# Patient Record
Sex: FEMALE | Race: WHITE | NOT HISPANIC OR LATINO | Employment: OTHER | URBAN - METROPOLITAN AREA
[De-identification: names, ages, dates, MRNs, and addresses within clinical notes are randomized per-mention and may not be internally consistent; named-entity substitution may affect disease eponyms.]

---

## 2023-01-01 ENCOUNTER — TELEPHONE (OUTPATIENT)
Dept: PRIMARY CARE | Facility: CLINIC | Age: 73
End: 2023-01-01
Payer: MEDICARE

## 2023-01-01 ENCOUNTER — APPOINTMENT (OUTPATIENT)
Dept: RADIOLOGY | Facility: HOSPITAL | Age: 73
DRG: 299 | End: 2023-01-01
Payer: MEDICARE

## 2023-01-01 ENCOUNTER — APPOINTMENT (OUTPATIENT)
Dept: CARDIOLOGY | Facility: HOSPITAL | Age: 73
DRG: 299 | End: 2023-01-01
Payer: MEDICARE

## 2023-01-01 ENCOUNTER — OFFICE VISIT (OUTPATIENT)
Dept: NEPHROLOGY | Facility: CLINIC | Age: 73
End: 2023-01-01
Payer: MEDICARE

## 2023-01-01 ENCOUNTER — DOCUMENTATION (OUTPATIENT)
Dept: PRIMARY CARE | Facility: CLINIC | Age: 73
End: 2023-01-01
Payer: MEDICARE

## 2023-01-01 ENCOUNTER — HOSPITAL ENCOUNTER (OUTPATIENT)
Dept: CARDIOLOGY | Facility: HOSPITAL | Age: 73
Discharge: HOME | DRG: 299 | End: 2023-12-01
Payer: MEDICARE

## 2023-01-01 ENCOUNTER — TELEPHONE (OUTPATIENT)
Dept: PRIMARY CARE | Facility: CLINIC | Age: 73
End: 2023-01-01

## 2023-01-01 ENCOUNTER — OFFICE VISIT (OUTPATIENT)
Dept: PRIMARY CARE | Facility: CLINIC | Age: 73
End: 2023-01-01
Payer: MEDICARE

## 2023-01-01 ENCOUNTER — APPOINTMENT (OUTPATIENT)
Dept: VASCULAR SURGERY | Facility: CLINIC | Age: 73
End: 2023-01-01
Payer: MEDICARE

## 2023-01-01 ENCOUNTER — OFFICE VISIT (OUTPATIENT)
Dept: PODIATRY | Facility: CLINIC | Age: 73
End: 2023-01-01
Payer: MEDICARE

## 2023-01-01 ENCOUNTER — HOSPITAL ENCOUNTER (OUTPATIENT)
Dept: CARDIOLOGY | Facility: HOSPITAL | Age: 73
Discharge: HOME | End: 2023-11-20
Payer: MEDICARE

## 2023-01-01 ENCOUNTER — APPOINTMENT (OUTPATIENT)
Dept: PRIMARY CARE | Facility: CLINIC | Age: 73
End: 2023-01-01
Payer: MEDICARE

## 2023-01-01 ENCOUNTER — PATIENT MESSAGE (OUTPATIENT)
Dept: PRIMARY CARE | Facility: CLINIC | Age: 73
End: 2023-01-01
Payer: MEDICARE

## 2023-01-01 ENCOUNTER — APPOINTMENT (OUTPATIENT)
Dept: CARDIOLOGY | Facility: HOSPITAL | Age: 73
DRG: 683 | End: 2023-01-01
Payer: MEDICARE

## 2023-01-01 ENCOUNTER — APPOINTMENT (OUTPATIENT)
Dept: PODIATRY | Facility: CLINIC | Age: 73
End: 2023-01-01
Payer: MEDICARE

## 2023-01-01 ENCOUNTER — APPOINTMENT (OUTPATIENT)
Dept: RADIOLOGY | Facility: HOSPITAL | Age: 73
DRG: 683 | End: 2023-01-01
Payer: MEDICARE

## 2023-01-01 ENCOUNTER — PATIENT OUTREACH (OUTPATIENT)
Dept: CARE COORDINATION | Facility: CLINIC | Age: 73
End: 2023-01-01
Payer: MEDICARE

## 2023-01-01 ENCOUNTER — HOSPITAL ENCOUNTER (EMERGENCY)
Facility: HOSPITAL | Age: 73
Discharge: HOME | DRG: 299 | End: 2023-11-21
Attending: STUDENT IN AN ORGANIZED HEALTH CARE EDUCATION/TRAINING PROGRAM
Payer: MEDICARE

## 2023-01-01 ENCOUNTER — HOSPITAL ENCOUNTER (OUTPATIENT)
Dept: CARDIOLOGY | Facility: HOSPITAL | Age: 73
Discharge: HOME | End: 2023-12-05
Payer: MEDICARE

## 2023-01-01 ENCOUNTER — APPOINTMENT (OUTPATIENT)
Dept: OPHTHALMOLOGY | Facility: CLINIC | Age: 73
End: 2023-01-01
Payer: MEDICARE

## 2023-01-01 ENCOUNTER — HOSPITAL ENCOUNTER (INPATIENT)
Facility: HOSPITAL | Age: 73
LOS: 4 days | Discharge: NURSING, REHAB OR LONG-TERM CARE | DRG: 299 | End: 2023-12-02
Attending: EMERGENCY MEDICINE | Admitting: EMERGENCY MEDICINE
Payer: MEDICARE

## 2023-01-01 ENCOUNTER — HOSPITAL ENCOUNTER (INPATIENT)
Facility: HOSPITAL | Age: 73
LOS: 1 days | Discharge: HOME | DRG: 638 | End: 2023-10-18
Attending: STUDENT IN AN ORGANIZED HEALTH CARE EDUCATION/TRAINING PROGRAM | Admitting: STUDENT IN AN ORGANIZED HEALTH CARE EDUCATION/TRAINING PROGRAM
Payer: MEDICARE

## 2023-01-01 ENCOUNTER — HOSPITAL ENCOUNTER (INPATIENT)
Facility: HOSPITAL | Age: 73
LOS: 4 days | Discharge: NURSING, REHAB OR LONG-TERM CARE | DRG: 299 | End: 2023-11-28
Attending: STUDENT IN AN ORGANIZED HEALTH CARE EDUCATION/TRAINING PROGRAM | Admitting: STUDENT IN AN ORGANIZED HEALTH CARE EDUCATION/TRAINING PROGRAM
Payer: MEDICARE

## 2023-01-01 ENCOUNTER — TELEPHONE (OUTPATIENT)
Dept: PHARMACY | Facility: HOSPITAL | Age: 73
End: 2023-01-01
Payer: MEDICARE

## 2023-01-01 ENCOUNTER — OFFICE VISIT (OUTPATIENT)
Dept: CARDIOLOGY | Facility: CLINIC | Age: 73
End: 2023-01-01
Payer: MEDICARE

## 2023-01-01 ENCOUNTER — HOSPITAL ENCOUNTER (INPATIENT)
Facility: HOSPITAL | Age: 73
LOS: 5 days | Discharge: NURSING, REHAB OR LONG-TERM CARE | DRG: 683 | End: 2023-10-27
Attending: EMERGENCY MEDICINE | Admitting: INTERNAL MEDICINE
Payer: MEDICARE

## 2023-01-01 ENCOUNTER — TELEMEDICINE (OUTPATIENT)
Dept: PRIMARY CARE | Facility: CLINIC | Age: 73
End: 2023-01-01
Payer: MEDICARE

## 2023-01-01 ENCOUNTER — APPOINTMENT (OUTPATIENT)
Dept: WOUND CARE | Facility: CLINIC | Age: 73
End: 2023-01-01
Payer: MEDICARE

## 2023-01-01 VITALS
RESPIRATION RATE: 18 BRPM | WEIGHT: 179.9 LBS | DIASTOLIC BLOOD PRESSURE: 62 MMHG | TEMPERATURE: 96.8 F | SYSTOLIC BLOOD PRESSURE: 137 MMHG | HEIGHT: 58 IN | HEART RATE: 70 BPM | OXYGEN SATURATION: 99 % | BODY MASS INDEX: 37.76 KG/M2

## 2023-01-01 VITALS
HEIGHT: 58 IN | BODY MASS INDEX: 38.46 KG/M2 | SYSTOLIC BLOOD PRESSURE: 134 MMHG | WEIGHT: 183.2 LBS | DIASTOLIC BLOOD PRESSURE: 77 MMHG | TEMPERATURE: 98.6 F | HEART RATE: 71 BPM

## 2023-01-01 VITALS
TEMPERATURE: 97.8 F | WEIGHT: 182 LBS | SYSTOLIC BLOOD PRESSURE: 122 MMHG | BODY MASS INDEX: 38.04 KG/M2 | DIASTOLIC BLOOD PRESSURE: 70 MMHG | RESPIRATION RATE: 17 BRPM | OXYGEN SATURATION: 98 % | HEART RATE: 78 BPM

## 2023-01-01 VITALS
OXYGEN SATURATION: 98 % | WEIGHT: 213 LBS | BODY MASS INDEX: 44.52 KG/M2 | SYSTOLIC BLOOD PRESSURE: 105 MMHG | HEART RATE: 71 BPM | DIASTOLIC BLOOD PRESSURE: 50 MMHG

## 2023-01-01 VITALS
HEIGHT: 58 IN | RESPIRATION RATE: 16 BRPM | BODY MASS INDEX: 38.83 KG/M2 | TEMPERATURE: 97 F | HEART RATE: 68 BPM | WEIGHT: 185 LBS | SYSTOLIC BLOOD PRESSURE: 179 MMHG | DIASTOLIC BLOOD PRESSURE: 92 MMHG | OXYGEN SATURATION: 97 %

## 2023-01-01 VITALS — HEART RATE: 76 BPM | OXYGEN SATURATION: 97 % | WEIGHT: 210 LBS | BODY MASS INDEX: 44.08 KG/M2 | HEIGHT: 58 IN

## 2023-01-01 VITALS
RESPIRATION RATE: 16 BRPM | DIASTOLIC BLOOD PRESSURE: 50 MMHG | OXYGEN SATURATION: 95 % | TEMPERATURE: 97.9 F | HEART RATE: 72 BPM | WEIGHT: 200 LBS | BODY MASS INDEX: 41.98 KG/M2 | HEIGHT: 58 IN | SYSTOLIC BLOOD PRESSURE: 122 MMHG

## 2023-01-01 VITALS
RESPIRATION RATE: 18 BRPM | HEART RATE: 82 BPM | DIASTOLIC BLOOD PRESSURE: 72 MMHG | BODY MASS INDEX: 41.65 KG/M2 | WEIGHT: 198.41 LBS | OXYGEN SATURATION: 98 % | HEIGHT: 58 IN | SYSTOLIC BLOOD PRESSURE: 148 MMHG | TEMPERATURE: 97.5 F

## 2023-01-01 DIAGNOSIS — T14.8XXA WOUND INFECTION: ICD-10-CM

## 2023-01-01 DIAGNOSIS — R00.1 BRADYCARDIA: ICD-10-CM

## 2023-01-01 DIAGNOSIS — I87.2 CHRONIC VENOUS INSUFFICIENCY: ICD-10-CM

## 2023-01-01 DIAGNOSIS — I47.11 INAPPROPRIATE SINUS TACHYCARDIA (CMS-HCC): ICD-10-CM

## 2023-01-01 DIAGNOSIS — I25.10 ATHEROSCLEROSIS OF NATIVE CORONARY ARTERY OF NATIVE HEART WITHOUT ANGINA PECTORIS: ICD-10-CM

## 2023-01-01 DIAGNOSIS — H40.003 GLAUCOMA SUSPECT OF BOTH EYES: Primary | ICD-10-CM

## 2023-01-01 DIAGNOSIS — I87.331 IDIOPATHIC CHRONIC VENOUS HTN OF RIGHT LEG WITH ULCER AND INFLAMMATION (MULTI): ICD-10-CM

## 2023-01-01 DIAGNOSIS — I87.8 VENOUS STASIS: ICD-10-CM

## 2023-01-01 DIAGNOSIS — N18.4 CKD (CHRONIC KIDNEY DISEASE) STAGE 4, GFR 15-29 ML/MIN (MULTI): Chronic | ICD-10-CM

## 2023-01-01 DIAGNOSIS — L03.115 CELLULITIS OF RIGHT LEG: Primary | ICD-10-CM

## 2023-01-01 DIAGNOSIS — Z95.0 CARDIAC PACEMAKER IN SITU: Chronic | ICD-10-CM

## 2023-01-01 DIAGNOSIS — L97.912: Primary | ICD-10-CM

## 2023-01-01 DIAGNOSIS — E11.22 CKD STAGE 3 DUE TO TYPE 2 DIABETES MELLITUS (MULTI): ICD-10-CM

## 2023-01-01 DIAGNOSIS — I12.9 HYPERTENSIVE KIDNEY DISEASE WITH STAGE 4 CHRONIC KIDNEY DISEASE (MULTI): Primary | ICD-10-CM

## 2023-01-01 DIAGNOSIS — R78.81 BACTEREMIA: ICD-10-CM

## 2023-01-01 DIAGNOSIS — Z95.0 PACEMAKER: ICD-10-CM

## 2023-01-01 DIAGNOSIS — I89.0 LYMPHEDEMA: ICD-10-CM

## 2023-01-01 DIAGNOSIS — B35.1 ONYCHOMYCOSIS: ICD-10-CM

## 2023-01-01 DIAGNOSIS — R60.0 EDEMA OF LEFT UPPER EXTREMITY: Primary | ICD-10-CM

## 2023-01-01 DIAGNOSIS — E78.2 MIXED HYPERLIPIDEMIA: Chronic | ICD-10-CM

## 2023-01-01 DIAGNOSIS — L03.116 CELLULITIS OF LEFT LOWER EXTREMITY: ICD-10-CM

## 2023-01-01 DIAGNOSIS — I10 ESSENTIAL (PRIMARY) HYPERTENSION: Chronic | ICD-10-CM

## 2023-01-01 DIAGNOSIS — L08.9 WOUND INFECTION: Primary | ICD-10-CM

## 2023-01-01 DIAGNOSIS — E11.69 TYPE 2 DIABETES MELLITUS WITH OTHER SPECIFIED COMPLICATION, WITH LONG-TERM CURRENT USE OF INSULIN (MULTI): Primary | ICD-10-CM

## 2023-01-01 DIAGNOSIS — M79.89 REDNESS AND SWELLING OF UPPER ARM: ICD-10-CM

## 2023-01-01 DIAGNOSIS — I87.332 IDIOPATHIC CHRONIC VENOUS HYPERTENSION OF LEG W/ULCER AND INFLAMMATION, LEFT (MULTI): ICD-10-CM

## 2023-01-01 DIAGNOSIS — L08.9 WOUND INFECTION: ICD-10-CM

## 2023-01-01 DIAGNOSIS — M10.9 GOUT, UNSPECIFIED CAUSE, UNSPECIFIED CHRONICITY, UNSPECIFIED SITE: ICD-10-CM

## 2023-01-01 DIAGNOSIS — D72.829 LEUKOCYTOSIS, UNSPECIFIED TYPE: ICD-10-CM

## 2023-01-01 DIAGNOSIS — R23.8 REDNESS AND SWELLING OF UPPER ARM: ICD-10-CM

## 2023-01-01 DIAGNOSIS — Z95.0 PACEMAKER: Primary | ICD-10-CM

## 2023-01-01 DIAGNOSIS — I89.0 LYMPHEDEMA: Primary | ICD-10-CM

## 2023-01-01 DIAGNOSIS — I48.0 PAROXYSMAL ATRIAL FIBRILLATION (MULTI): Primary | ICD-10-CM

## 2023-01-01 DIAGNOSIS — M25.422 SWELLING OF JOINT OF UPPER ARM, LEFT: ICD-10-CM

## 2023-01-01 DIAGNOSIS — J18.9 COMMUNITY ACQUIRED PNEUMONIA, UNSPECIFIED LATERALITY: ICD-10-CM

## 2023-01-01 DIAGNOSIS — E11.21 CONTROLLED TYPE 2 DIABETES MELLITUS WITH MICROALBUMINURIC DIABETIC NEPHROPATHY (MULTI): Chronic | ICD-10-CM

## 2023-01-01 DIAGNOSIS — N17.9 ACUTE RENAL FAILURE, UNSPECIFIED ACUTE RENAL FAILURE TYPE (CMS-HCC): ICD-10-CM

## 2023-01-01 DIAGNOSIS — I87.2 VENOUS INSUFFICIENCY (CHRONIC) (PERIPHERAL): Chronic | ICD-10-CM

## 2023-01-01 DIAGNOSIS — L97.912 NON-PRESSURE ULCER OF RIGHT LOWER EXTREMITY WITH FAT LAYER EXPOSED (MULTI): ICD-10-CM

## 2023-01-01 DIAGNOSIS — R21 RASH: ICD-10-CM

## 2023-01-01 DIAGNOSIS — L03.115 CELLULITIS OF LEG, RIGHT: ICD-10-CM

## 2023-01-01 DIAGNOSIS — T78.40XA ALLERGIC REACTION, INITIAL ENCOUNTER: ICD-10-CM

## 2023-01-01 DIAGNOSIS — L97.909 CHRONIC CUTANEOUS VENOUS STASIS ULCER (MULTI): Primary | ICD-10-CM

## 2023-01-01 DIAGNOSIS — B37.2 YEAST INFECTION OF THE SKIN: ICD-10-CM

## 2023-01-01 DIAGNOSIS — L03.115 CELLULITIS OF RIGHT LOWER EXTREMITY: ICD-10-CM

## 2023-01-01 DIAGNOSIS — A04.72 C. DIFFICILE DIARRHEA: ICD-10-CM

## 2023-01-01 DIAGNOSIS — E16.2 HYPOGLYCEMIA: Primary | ICD-10-CM

## 2023-01-01 DIAGNOSIS — B37.9 CANDIDIASIS: ICD-10-CM

## 2023-01-01 DIAGNOSIS — L85.3 XEROSIS OF SKIN: ICD-10-CM

## 2023-01-01 DIAGNOSIS — T14.8XXA WOUND INFECTION: Primary | ICD-10-CM

## 2023-01-01 DIAGNOSIS — I10 BENIGN ESSENTIAL HYPERTENSION: Chronic | ICD-10-CM

## 2023-01-01 DIAGNOSIS — N18.30 CKD STAGE 3 DUE TO TYPE 2 DIABETES MELLITUS (MULTI): ICD-10-CM

## 2023-01-01 DIAGNOSIS — R19.7 DIARRHEA, UNSPECIFIED TYPE: ICD-10-CM

## 2023-01-01 DIAGNOSIS — I83.009 CHRONIC CUTANEOUS VENOUS STASIS ULCER (MULTI): Primary | ICD-10-CM

## 2023-01-01 DIAGNOSIS — R00.0 TACHYCARDIA: ICD-10-CM

## 2023-01-01 DIAGNOSIS — I05.9 MITRAL VALVE DISORDER: ICD-10-CM

## 2023-01-01 DIAGNOSIS — M10.9 GOUT, UNSPECIFIED CAUSE, UNSPECIFIED CHRONICITY, UNSPECIFIED SITE: Primary | ICD-10-CM

## 2023-01-01 DIAGNOSIS — I48.20 CHRONIC ATRIAL FIBRILLATION (MULTI): Chronic | ICD-10-CM

## 2023-01-01 DIAGNOSIS — N18.4 HYPERTENSIVE KIDNEY DISEASE WITH STAGE 4 CHRONIC KIDNEY DISEASE (MULTI): Primary | ICD-10-CM

## 2023-01-01 DIAGNOSIS — T78.40XA ALLERGIC REACTION, INITIAL ENCOUNTER: Primary | ICD-10-CM

## 2023-01-01 DIAGNOSIS — Z79.4 TYPE 2 DIABETES MELLITUS WITH OTHER SPECIFIED COMPLICATION, WITH LONG-TERM CURRENT USE OF INSULIN (MULTI): Primary | ICD-10-CM

## 2023-01-01 DIAGNOSIS — K86.2 PANCREATIC CYST (HHS-HCC): Chronic | ICD-10-CM

## 2023-01-01 LAB
ALBUMIN (G/DL) IN SER/PLAS: 3.9 G/DL (ref 3.4–5)
ALBUMIN (G/DL) IN SER/PLAS: 4.2 G/DL (ref 3.4–5)
ALBUMIN (G/DL) IN SER/PLAS: 4.3 G/DL (ref 3.4–5)
ALBUMIN (G/DL) IN SER/PLAS: 4.4 G/DL (ref 3.4–5)
ALBUMIN (MG/L) IN URINE: 159.5 MG/L
ALBUMIN (MG/L) IN URINE: 36.7 MG/L
ALBUMIN SERPL BCP-MCNC: 2.3 G/DL (ref 3.4–5)
ALBUMIN SERPL BCP-MCNC: 2.3 G/DL (ref 3.4–5)
ALBUMIN SERPL BCP-MCNC: 2.4 G/DL (ref 3.4–5)
ALBUMIN SERPL BCP-MCNC: 2.5 G/DL (ref 3.4–5)
ALBUMIN SERPL BCP-MCNC: 2.6 G/DL (ref 3.4–5)
ALBUMIN SERPL BCP-MCNC: 2.8 G/DL (ref 3.4–5)
ALBUMIN SERPL BCP-MCNC: 3 G/DL (ref 3.4–5)
ALBUMIN SERPL BCP-MCNC: 3.1 G/DL (ref 3.4–5)
ALBUMIN SERPL BCP-MCNC: 3.3 G/DL (ref 3.4–5)
ALBUMIN SERPL BCP-MCNC: 3.4 G/DL (ref 3.4–5)
ALBUMIN SERPL BCP-MCNC: 3.5 G/DL (ref 3.4–5)
ALBUMIN SERPL BCP-MCNC: 3.5 G/DL (ref 3.4–5)
ALBUMIN/CREATININE (UG/MG) IN URINE: 247.3 UG/MG CRT (ref 0–30)
ALBUMIN/CREATININE (UG/MG) IN URINE: 60.5 UG/MG CRT (ref 0–30)
ALDOLASE SERPL-CCNC: 9.4 U/L (ref 1.2–7.6)
ALP SERPL-CCNC: 113 U/L (ref 33–136)
ALP SERPL-CCNC: 122 U/L (ref 33–136)
ALP SERPL-CCNC: 130 U/L (ref 33–136)
ALP SERPL-CCNC: 63 U/L (ref 33–136)
ALP SERPL-CCNC: 65 U/L (ref 33–136)
ALP SERPL-CCNC: 70 U/L (ref 33–136)
ALP SERPL-CCNC: 72 U/L (ref 33–136)
ALP SERPL-CCNC: 81 U/L (ref 33–136)
ALP SERPL-CCNC: 94 U/L (ref 33–136)
ALP SERPL-CCNC: 97 U/L (ref 33–136)
ALT SERPL W P-5'-P-CCNC: 11 U/L (ref 7–45)
ALT SERPL W P-5'-P-CCNC: 12 U/L (ref 7–45)
ALT SERPL W P-5'-P-CCNC: 12 U/L (ref 7–45)
ALT SERPL W P-5'-P-CCNC: 13 U/L (ref 7–45)
ALT SERPL W P-5'-P-CCNC: 15 U/L (ref 7–45)
ALT SERPL W P-5'-P-CCNC: 15 U/L (ref 7–45)
ALT SERPL W P-5'-P-CCNC: 21 U/L (ref 7–45)
ALT SERPL W P-5'-P-CCNC: 21 U/L (ref 7–45)
ALT SERPL W P-5'-P-CCNC: 22 U/L (ref 7–45)
ALT SERPL W P-5'-P-CCNC: 22 U/L (ref 7–45)
ANA SER QL HEP2 SUBST: NEGATIVE
ANION GAP IN SER/PLAS: 13 MMOL/L (ref 10–20)
ANION GAP IN SER/PLAS: 13 MMOL/L (ref 10–20)
ANION GAP IN SER/PLAS: 14 MMOL/L (ref 10–20)
ANION GAP IN SER/PLAS: 15 MMOL/L (ref 10–20)
ANION GAP SERPL CALC-SCNC: 13 MMOL/L (ref 10–20)
ANION GAP SERPL CALC-SCNC: 14 MMOL/L (ref 10–20)
ANION GAP SERPL CALC-SCNC: 15 MMOL/L (ref 10–20)
ANION GAP SERPL CALC-SCNC: 16 MMOL/L (ref 10–20)
ANION GAP SERPL CALC-SCNC: 17 MMOL/L (ref 10–20)
ANION GAP SERPL CALC-SCNC: 19 MMOL/L (ref 10–20)
ANION GAP SERPL CALC-SCNC: 20 MMOL/L (ref 10–20)
ANION GAP SERPL CALC-SCNC: 21 MMOL/L (ref 10–20)
ANION GAP SERPL CALC-SCNC: 24 MMOL/L (ref 10–20)
APPEARANCE UR: ABNORMAL
APPEARANCE UR: ABNORMAL
APPEARANCE UR: CLEAR
APPEARANCE, URINE: CLEAR
APPEARANCE, URINE: CLEAR
AST SERPL W P-5'-P-CCNC: 18 U/L (ref 9–39)
AST SERPL W P-5'-P-CCNC: 19 U/L (ref 9–39)
AST SERPL W P-5'-P-CCNC: 22 U/L (ref 9–39)
AST SERPL W P-5'-P-CCNC: 22 U/L (ref 9–39)
AST SERPL W P-5'-P-CCNC: 24 U/L (ref 9–39)
AST SERPL W P-5'-P-CCNC: 29 U/L (ref 9–39)
AST SERPL W P-5'-P-CCNC: 31 U/L (ref 9–39)
AST SERPL W P-5'-P-CCNC: 31 U/L (ref 9–39)
AST SERPL W P-5'-P-CCNC: 33 U/L (ref 9–39)
AST SERPL W P-5'-P-CCNC: 66 U/L (ref 9–39)
ATRIAL RATE: 133 BPM
ATRIAL RATE: 240 BPM
ATRIAL RATE: 535 BPM
ATRIAL RATE: 69 BPM
BACTERIA #/AREA URNS AUTO: ABNORMAL /HPF
BACTERIA BLD AEROBE CULT: ABNORMAL
BACTERIA BLD AEROBE CULT: ABNORMAL
BACTERIA BLD CULT: ABNORMAL
BACTERIA BLD CULT: ABNORMAL
BACTERIA BLD CULT: NORMAL
BACTERIA SPEC CULT: ABNORMAL
BACTERIA UR CULT: ABNORMAL
BACTERIA UR CULT: NO GROWTH
BACTERIA, URINE: ABNORMAL /HPF
BASOPHILS # BLD AUTO: 0.05 X10*3/UL (ref 0–0.1)
BASOPHILS # BLD AUTO: 0.06 X10*3/UL (ref 0–0.1)
BASOPHILS # BLD AUTO: 0.07 X10*3/UL (ref 0–0.1)
BASOPHILS # BLD AUTO: 0.08 X10*3/UL (ref 0–0.1)
BASOPHILS # BLD AUTO: 0.11 X10*3/UL (ref 0–0.1)
BASOPHILS # BLD AUTO: 0.13 X10*3/UL (ref 0–0.1)
BASOPHILS # BLD MANUAL: 0 X10*3/UL (ref 0–0.1)
BASOPHILS NFR BLD AUTO: 0.3 %
BASOPHILS NFR BLD AUTO: 0.4 %
BASOPHILS NFR BLD AUTO: 0.4 %
BASOPHILS NFR BLD AUTO: 0.5 %
BASOPHILS NFR BLD AUTO: 0.5 %
BASOPHILS NFR BLD AUTO: 0.6 %
BASOPHILS NFR BLD AUTO: 0.6 %
BASOPHILS NFR BLD AUTO: 0.8 %
BASOPHILS NFR BLD AUTO: 0.8 %
BASOPHILS NFR BLD MANUAL: 0 %
BILIRUB SERPL-MCNC: 0.3 MG/DL (ref 0–1.2)
BILIRUB SERPL-MCNC: 0.4 MG/DL (ref 0–1.2)
BILIRUB UR STRIP.AUTO-MCNC: NEGATIVE MG/DL
BILIRUBIN, URINE: NEGATIVE
BILIRUBIN, URINE: NEGATIVE
BLOOD, URINE: NEGATIVE
BLOOD, URINE: NEGATIVE
BNP SERPL-MCNC: 1210 PG/ML (ref 0–99)
BNP SERPL-MCNC: 421 PG/ML (ref 0–99)
BUN SERPL-MCNC: 100 MG/DL (ref 6–23)
BUN SERPL-MCNC: 102 MG/DL (ref 6–23)
BUN SERPL-MCNC: 35 MG/DL (ref 6–23)
BUN SERPL-MCNC: 38 MG/DL (ref 6–23)
BUN SERPL-MCNC: 39 MG/DL (ref 6–23)
BUN SERPL-MCNC: 39 MG/DL (ref 6–23)
BUN SERPL-MCNC: 47 MG/DL (ref 6–23)
BUN SERPL-MCNC: 52 MG/DL (ref 6–23)
BUN SERPL-MCNC: 54 MG/DL (ref 6–23)
BUN SERPL-MCNC: 55 MG/DL (ref 6–23)
BUN SERPL-MCNC: 57 MG/DL (ref 6–23)
BUN SERPL-MCNC: 58 MG/DL (ref 6–23)
BUN SERPL-MCNC: 58 MG/DL (ref 6–23)
BUN SERPL-MCNC: 63 MG/DL (ref 6–23)
BUN SERPL-MCNC: 63 MG/DL (ref 6–23)
BUN SERPL-MCNC: 78 MG/DL (ref 6–23)
BUN SERPL-MCNC: 92 MG/DL (ref 6–23)
BUN SERPL-MCNC: 97 MG/DL (ref 6–23)
BUN SERPL-MCNC: 97 MG/DL (ref 6–23)
C COLI+JEJ+UPSA DNA STL QL NAA+PROBE: NOT DETECTED
C DIF TOX TCDA+TCDB STL QL NAA+PROBE: DETECTED
C DIF TOX TCDA+TCDB STL QL NAA+PROBE: NOT DETECTED
C DIFF TOX A+B STL QL IA: NEGATIVE
C3 SERPL-MCNC: 174 MG/DL (ref 87–200)
C4 SERPL-MCNC: 46 MG/DL (ref 10–50)
CALCIDIOL (25 OH VITAMIN D3) (NG/ML) IN SER/PLAS: 52 NG/ML
CALCIDIOL (25 OH VITAMIN D3) (NG/ML) IN SER/PLAS: 64 NG/ML
CALCIUM (MG/DL) IN SER/PLAS: 10 MG/DL (ref 8.6–10.3)
CALCIUM (MG/DL) IN SER/PLAS: 9.6 MG/DL (ref 8.6–10.3)
CALCIUM (MG/DL) IN SER/PLAS: 9.6 MG/DL (ref 8.6–10.6)
CALCIUM (MG/DL) IN SER/PLAS: 9.8 MG/DL (ref 8.6–10.3)
CALCIUM SERPL-MCNC: 7.5 MG/DL (ref 8.6–10.3)
CALCIUM SERPL-MCNC: 7.7 MG/DL (ref 8.6–10.3)
CALCIUM SERPL-MCNC: 7.8 MG/DL (ref 8.6–10.3)
CALCIUM SERPL-MCNC: 8 MG/DL (ref 8.6–10.3)
CALCIUM SERPL-MCNC: 8.1 MG/DL (ref 8.6–10.3)
CALCIUM SERPL-MCNC: 8.2 MG/DL (ref 8.6–10.3)
CALCIUM SERPL-MCNC: 8.4 MG/DL (ref 8.6–10.3)
CALCIUM SERPL-MCNC: 8.6 MG/DL (ref 8.6–10.3)
CALCIUM SERPL-MCNC: 8.6 MG/DL (ref 8.6–10.3)
CALCIUM SERPL-MCNC: 8.7 MG/DL (ref 8.6–10.3)
CALCIUM SERPL-MCNC: 8.7 MG/DL (ref 8.6–10.3)
CALCIUM SERPL-MCNC: 8.8 MG/DL (ref 8.6–10.3)
CALCIUM SERPL-MCNC: 9 MG/DL (ref 8.6–10.3)
CALCIUM SERPL-MCNC: 9.2 MG/DL (ref 8.6–10.3)
CARBON DIOXIDE, TOTAL (MMOL/L) IN SER/PLAS: 25 MMOL/L (ref 21–32)
CARBON DIOXIDE, TOTAL (MMOL/L) IN SER/PLAS: 29 MMOL/L (ref 21–32)
CARBON DIOXIDE, TOTAL (MMOL/L) IN SER/PLAS: 30 MMOL/L (ref 21–32)
CARBON DIOXIDE, TOTAL (MMOL/L) IN SER/PLAS: 33 MMOL/L (ref 21–32)
CARDIAC TROPONIN I PNL SERPL HS: 29 NG/L (ref 0–13)
CARDIAC TROPONIN I PNL SERPL HS: 30 NG/L (ref 0–13)
CARDIAC TROPONIN I PNL SERPL HS: 37 NG/L (ref 0–13)
CARDIAC TROPONIN I PNL SERPL HS: 41 NG/L (ref 0–13)
CARDIAC TROPONIN I PNL SERPL HS: 55 NG/L (ref 0–13)
CARDIAC TROPONIN I PNL SERPL HS: 66 NG/L (ref 0–13)
CELL POPULATIONS: NORMAL
CH50 SERPL-ACNC: 93.2 U/ML (ref 38.7–89.9)
CHLORIDE (MMOL/L) IN SER/PLAS: 102 MMOL/L (ref 98–107)
CHLORIDE (MMOL/L) IN SER/PLAS: 103 MMOL/L (ref 98–107)
CHLORIDE (MMOL/L) IN SER/PLAS: 104 MMOL/L (ref 98–107)
CHLORIDE (MMOL/L) IN SER/PLAS: 111 MMOL/L (ref 98–107)
CHLORIDE SERPL-SCNC: 100 MMOL/L (ref 98–107)
CHLORIDE SERPL-SCNC: 102 MMOL/L (ref 98–107)
CHLORIDE SERPL-SCNC: 102 MMOL/L (ref 98–107)
CHLORIDE SERPL-SCNC: 103 MMOL/L (ref 98–107)
CHLORIDE SERPL-SCNC: 104 MMOL/L (ref 98–107)
CHLORIDE SERPL-SCNC: 104 MMOL/L (ref 98–107)
CHLORIDE SERPL-SCNC: 105 MMOL/L (ref 98–107)
CHLORIDE SERPL-SCNC: 105 MMOL/L (ref 98–107)
CHLORIDE SERPL-SCNC: 106 MMOL/L (ref 98–107)
CHLORIDE SERPL-SCNC: 107 MMOL/L (ref 98–107)
CHLORIDE SERPL-SCNC: 109 MMOL/L (ref 98–107)
CHLORIDE SERPL-SCNC: 110 MMOL/L (ref 98–107)
CHLORIDE SERPL-SCNC: 110 MMOL/L (ref 98–107)
CHLORIDE UR-SCNC: 16 MMOL/L
CHLORIDE UR-SCNC: <15 MMOL/L
CHLORIDE UR-SCNC: <15 MMOL/L
CHLORIDE/CREATININE (MMOL/G) IN URINE: 12 MMOL/G CREAT (ref 38–318)
CHLORIDE/CREATININE (MMOL/G) IN URINE: NORMAL
CHLORIDE/CREATININE (MMOL/G) IN URINE: NORMAL
CHOLESTEROL (MG/DL) IN SER/PLAS: 210 MG/DL (ref 0–199)
CHOLESTEROL IN HDL (MG/DL) IN SER/PLAS: 65.2 MG/DL
CHOLESTEROL/HDL RATIO: 3.2
CK MB CFR SERPL CALC: 8 %MB OF CK
CK MB SERPL-CCNC: 9.5 NG/ML
CK SERPL-CCNC: 113 U/L (ref 0–215)
CO2 SERPL-SCNC: 15 MMOL/L (ref 21–32)
CO2 SERPL-SCNC: 15 MMOL/L (ref 21–32)
CO2 SERPL-SCNC: 16 MMOL/L (ref 21–32)
CO2 SERPL-SCNC: 17 MMOL/L (ref 21–32)
CO2 SERPL-SCNC: 19 MMOL/L (ref 21–32)
CO2 SERPL-SCNC: 21 MMOL/L (ref 21–32)
CO2 SERPL-SCNC: 22 MMOL/L (ref 21–32)
CO2 SERPL-SCNC: 22 MMOL/L (ref 21–32)
CO2 SERPL-SCNC: 23 MMOL/L (ref 21–32)
CO2 SERPL-SCNC: 23 MMOL/L (ref 21–32)
CO2 SERPL-SCNC: 24 MMOL/L (ref 21–32)
CO2 SERPL-SCNC: 24 MMOL/L (ref 21–32)
CO2 SERPL-SCNC: 25 MMOL/L (ref 21–32)
CO2 SERPL-SCNC: 28 MMOL/L (ref 21–32)
COLOR UR: ABNORMAL
COLOR UR: YELLOW
COLOR UR: YELLOW
COLOR, URINE: YELLOW
COLOR, URINE: YELLOW
CREAT SERPL-MCNC: 1.38 MG/DL (ref 0.5–1.05)
CREAT SERPL-MCNC: 2.07 MG/DL (ref 0.5–1.05)
CREAT SERPL-MCNC: 2.09 MG/DL (ref 0.5–1.05)
CREAT SERPL-MCNC: 2.15 MG/DL (ref 0.5–1.05)
CREAT SERPL-MCNC: 2.38 MG/DL (ref 0.5–1.05)
CREAT SERPL-MCNC: 2.54 MG/DL (ref 0.5–1.05)
CREAT SERPL-MCNC: 2.62 MG/DL (ref 0.5–1.05)
CREAT SERPL-MCNC: 2.68 MG/DL (ref 0.5–1.05)
CREAT SERPL-MCNC: 2.68 MG/DL (ref 0.5–1.05)
CREAT SERPL-MCNC: 2.75 MG/DL (ref 0.5–1.05)
CREAT SERPL-MCNC: 3.74 MG/DL (ref 0.5–1.05)
CREAT SERPL-MCNC: 4.09 MG/DL (ref 0.5–1.05)
CREAT SERPL-MCNC: 4.81 MG/DL (ref 0.5–1.05)
CREAT SERPL-MCNC: 5.18 MG/DL (ref 0.5–1.05)
CREAT SERPL-MCNC: 5.25 MG/DL (ref 0.5–1.05)
CREAT SERPL-MCNC: 5.25 MG/DL (ref 0.5–1.05)
CREAT SERPL-MCNC: 5.3 MG/DL (ref 0.5–1.05)
CREAT SERPL-MCNC: 5.32 MG/DL (ref 0.5–1.05)
CREAT SERPL-MCNC: 5.6 MG/DL (ref 0.5–1.05)
CREAT UR-MCNC: 123.6 MG/DL (ref 20–320)
CREAT UR-MCNC: 135.4 MG/DL (ref 20–320)
CREAT UR-MCNC: 215.7 MG/DL (ref 20–320)
CREATININE (MG/DL) IN SER/PLAS: 1.37 MG/DL (ref 0.5–1.05)
CREATININE (MG/DL) IN SER/PLAS: 1.5 MG/DL (ref 0.5–1.05)
CREATININE (MG/DL) IN SER/PLAS: 1.7 MG/DL (ref 0.5–1.05)
CREATININE (MG/DL) IN SER/PLAS: 1.76 MG/DL (ref 0.5–1.05)
CREATININE (MG/DL) IN URINE: 60.7 MG/DL (ref 20–320)
CREATININE (MG/DL) IN URINE: 60.7 MG/DL (ref 20–320)
CREATININE (MG/DL) IN URINE: 64.5 MG/DL (ref 20–320)
CREATININE (MG/DL) IN URINE: 64.5 MG/DL (ref 20–320)
CRP SERPL-MCNC: 12.77 MG/DL
CRP SERPL-MCNC: 4.71 MG/DL
DIAGNOSIS: NORMAL
EC STX1 GENE STL QL NAA+PROBE: NOT DETECTED
EC STX2 GENE STL QL NAA+PROBE: NOT DETECTED
EJECTION FRACTION APICAL 4 CHAMBER: 68.5
EOSINOPHIL # BLD AUTO: 0.03 X10*3/UL (ref 0–0.4)
EOSINOPHIL # BLD AUTO: 0.34 X10*3/UL (ref 0–0.4)
EOSINOPHIL # BLD AUTO: 0.55 X10*3/UL (ref 0–0.4)
EOSINOPHIL # BLD AUTO: 0.58 X10*3/UL (ref 0–0.4)
EOSINOPHIL # BLD AUTO: 0.59 X10*3/UL (ref 0–0.4)
EOSINOPHIL # BLD AUTO: 0.66 X10*3/UL (ref 0–0.4)
EOSINOPHIL # BLD AUTO: 0.7 X10*3/UL (ref 0–0.4)
EOSINOPHIL # BLD AUTO: 0.85 X10*3/UL (ref 0–0.4)
EOSINOPHIL # BLD AUTO: 0.9 X10*3/UL (ref 0–0.4)
EOSINOPHIL # BLD MANUAL: 0 X10*3/UL (ref 0–0.4)
EOSINOPHIL NFR BLD AUTO: 0.1 %
EOSINOPHIL NFR BLD AUTO: 1.8 %
EOSINOPHIL NFR BLD AUTO: 2.7 %
EOSINOPHIL NFR BLD AUTO: 4.9 %
EOSINOPHIL NFR BLD AUTO: 5.9 %
EOSINOPHIL NFR BLD AUTO: 6.4 %
EOSINOPHIL NFR BLD AUTO: 7.2 %
EOSINOPHIL NFR BLD AUTO: 7.8 %
EOSINOPHIL NFR BLD AUTO: 8.4 %
EOSINOPHIL NFR BLD MANUAL: 0 %
ERYTHROCYTE DISTRIBUTION WIDTH (RATIO) BY AUTOMATED COUNT: 14.2 % (ref 11.5–14.5)
ERYTHROCYTE DISTRIBUTION WIDTH (RATIO) BY AUTOMATED COUNT: 14.9 % (ref 11.5–14.5)
ERYTHROCYTE MEAN CORPUSCULAR HEMOGLOBIN CONCENTRATION (G/DL) BY AUTOMATED: 31.3 G/DL (ref 32–36)
ERYTHROCYTE MEAN CORPUSCULAR HEMOGLOBIN CONCENTRATION (G/DL) BY AUTOMATED: 31.6 G/DL (ref 32–36)
ERYTHROCYTE MEAN CORPUSCULAR VOLUME (FL) BY AUTOMATED COUNT: 94 FL (ref 80–100)
ERYTHROCYTE MEAN CORPUSCULAR VOLUME (FL) BY AUTOMATED COUNT: 96 FL (ref 80–100)
ERYTHROCYTE [DISTWIDTH] IN BLOOD BY AUTOMATED COUNT: 14 % (ref 11.5–14.5)
ERYTHROCYTE [DISTWIDTH] IN BLOOD BY AUTOMATED COUNT: 14.3 % (ref 11.5–14.5)
ERYTHROCYTE [DISTWIDTH] IN BLOOD BY AUTOMATED COUNT: 14.4 % (ref 11.5–14.5)
ERYTHROCYTE [DISTWIDTH] IN BLOOD BY AUTOMATED COUNT: 14.4 % (ref 11.5–14.5)
ERYTHROCYTE [DISTWIDTH] IN BLOOD BY AUTOMATED COUNT: 14.5 % (ref 11.5–14.5)
ERYTHROCYTE [DISTWIDTH] IN BLOOD BY AUTOMATED COUNT: 14.6 % (ref 11.5–14.5)
ERYTHROCYTE [DISTWIDTH] IN BLOOD BY AUTOMATED COUNT: 15.9 % (ref 11.5–14.5)
ERYTHROCYTE [DISTWIDTH] IN BLOOD BY AUTOMATED COUNT: 16.4 % (ref 11.5–14.5)
ERYTHROCYTE [DISTWIDTH] IN BLOOD BY AUTOMATED COUNT: 16.9 % (ref 11.5–14.5)
ERYTHROCYTE [DISTWIDTH] IN BLOOD BY AUTOMATED COUNT: 17 % (ref 11.5–14.5)
ERYTHROCYTE [DISTWIDTH] IN BLOOD BY AUTOMATED COUNT: 17.1 % (ref 11.5–14.5)
ERYTHROCYTE [DISTWIDTH] IN BLOOD BY AUTOMATED COUNT: 17.2 % (ref 11.5–14.5)
ERYTHROCYTE [DISTWIDTH] IN BLOOD BY AUTOMATED COUNT: 17.4 % (ref 11.5–14.5)
ERYTHROCYTE [SEDIMENTATION RATE] IN BLOOD BY WESTERGREN METHOD: 41 MM/H (ref 0–30)
ERYTHROCYTE [SEDIMENTATION RATE] IN BLOOD BY WESTERGREN METHOD: 83 MM/H (ref 0–30)
ERYTHROCYTES (10*6/UL) IN BLOOD BY AUTOMATED COUNT: 3.73 X10E12/L (ref 4–5.2)
ERYTHROCYTES (10*6/UL) IN BLOOD BY AUTOMATED COUNT: 4.04 X10E12/L (ref 4–5.2)
EST. AVERAGE GLUCOSE BLD GHB EST-MCNC: 151 MG/DL
FLOW DIFFERENTIAL: NORMAL
FLOW TEST ORDERED: NORMAL
FLUAV RNA RESP QL NAA+PROBE: NOT DETECTED
FLUBV RNA RESP QL NAA+PROBE: NOT DETECTED
GFR FEMALE: 30 ML/MIN/1.73M2
GFR FEMALE: 31 ML/MIN/1.73M2
GFR FEMALE: 36 ML/MIN/1.73M2
GFR FEMALE: 40 ML/MIN/1.73M2
GFR SERPL CREATININE-BSD FRML MDRD: 11 ML/MIN/1.73M*2
GFR SERPL CREATININE-BSD FRML MDRD: 12 ML/MIN/1.73M*2
GFR SERPL CREATININE-BSD FRML MDRD: 17 ML/MIN/1.73M*2
GFR SERPL CREATININE-BSD FRML MDRD: 18 ML/MIN/1.73M*2
GFR SERPL CREATININE-BSD FRML MDRD: 18 ML/MIN/1.73M*2
GFR SERPL CREATININE-BSD FRML MDRD: 19 ML/MIN/1.73M*2
GFR SERPL CREATININE-BSD FRML MDRD: 19 ML/MIN/1.73M*2
GFR SERPL CREATININE-BSD FRML MDRD: 21 ML/MIN/1.73M*2
GFR SERPL CREATININE-BSD FRML MDRD: 23 ML/MIN/1.73M*2
GFR SERPL CREATININE-BSD FRML MDRD: 24 ML/MIN/1.73M*2
GFR SERPL CREATININE-BSD FRML MDRD: 25 ML/MIN/1.73M*2
GFR SERPL CREATININE-BSD FRML MDRD: 40 ML/MIN/1.73M*2
GFR SERPL CREATININE-BSD FRML MDRD: 7 ML/MIN/1.73M*2
GFR SERPL CREATININE-BSD FRML MDRD: 8 ML/MIN/1.73M*2
GFR SERPL CREATININE-BSD FRML MDRD: 9 ML/MIN/1.73M*2
GLUCOSE (MG/DL) IN SER/PLAS: 101 MG/DL (ref 74–99)
GLUCOSE (MG/DL) IN SER/PLAS: 70 MG/DL (ref 74–99)
GLUCOSE (MG/DL) IN SER/PLAS: 93 MG/DL (ref 74–99)
GLUCOSE (MG/DL) IN SER/PLAS: 95 MG/DL (ref 74–99)
GLUCOSE BLD MANUAL STRIP-MCNC: 101 MG/DL (ref 74–99)
GLUCOSE BLD MANUAL STRIP-MCNC: 102 MG/DL (ref 74–99)
GLUCOSE BLD MANUAL STRIP-MCNC: 102 MG/DL (ref 74–99)
GLUCOSE BLD MANUAL STRIP-MCNC: 104 MG/DL (ref 74–99)
GLUCOSE BLD MANUAL STRIP-MCNC: 104 MG/DL (ref 74–99)
GLUCOSE BLD MANUAL STRIP-MCNC: 107 MG/DL (ref 74–99)
GLUCOSE BLD MANUAL STRIP-MCNC: 109 MG/DL (ref 74–99)
GLUCOSE BLD MANUAL STRIP-MCNC: 114 MG/DL (ref 74–99)
GLUCOSE BLD MANUAL STRIP-MCNC: 118 MG/DL (ref 74–99)
GLUCOSE BLD MANUAL STRIP-MCNC: 120 MG/DL (ref 74–99)
GLUCOSE BLD MANUAL STRIP-MCNC: 121 MG/DL (ref 74–99)
GLUCOSE BLD MANUAL STRIP-MCNC: 122 MG/DL (ref 74–99)
GLUCOSE BLD MANUAL STRIP-MCNC: 122 MG/DL (ref 74–99)
GLUCOSE BLD MANUAL STRIP-MCNC: 124 MG/DL (ref 74–99)
GLUCOSE BLD MANUAL STRIP-MCNC: 125 MG/DL (ref 74–99)
GLUCOSE BLD MANUAL STRIP-MCNC: 127 MG/DL (ref 74–99)
GLUCOSE BLD MANUAL STRIP-MCNC: 130 MG/DL (ref 74–99)
GLUCOSE BLD MANUAL STRIP-MCNC: 135 MG/DL (ref 74–99)
GLUCOSE BLD MANUAL STRIP-MCNC: 135 MG/DL (ref 74–99)
GLUCOSE BLD MANUAL STRIP-MCNC: 136 MG/DL (ref 74–99)
GLUCOSE BLD MANUAL STRIP-MCNC: 136 MG/DL (ref 74–99)
GLUCOSE BLD MANUAL STRIP-MCNC: 140 MG/DL (ref 74–99)
GLUCOSE BLD MANUAL STRIP-MCNC: 141 MG/DL (ref 74–99)
GLUCOSE BLD MANUAL STRIP-MCNC: 142 MG/DL (ref 74–99)
GLUCOSE BLD MANUAL STRIP-MCNC: 152 MG/DL (ref 74–99)
GLUCOSE BLD MANUAL STRIP-MCNC: 158 MG/DL (ref 74–99)
GLUCOSE BLD MANUAL STRIP-MCNC: 162 MG/DL (ref 74–99)
GLUCOSE BLD MANUAL STRIP-MCNC: 167 MG/DL (ref 74–99)
GLUCOSE BLD MANUAL STRIP-MCNC: 170 MG/DL (ref 74–99)
GLUCOSE BLD MANUAL STRIP-MCNC: 189 MG/DL (ref 74–99)
GLUCOSE BLD MANUAL STRIP-MCNC: 190 MG/DL (ref 74–99)
GLUCOSE BLD MANUAL STRIP-MCNC: 192 MG/DL (ref 74–99)
GLUCOSE BLD MANUAL STRIP-MCNC: 208 MG/DL (ref 74–99)
GLUCOSE BLD MANUAL STRIP-MCNC: 211 MG/DL (ref 74–99)
GLUCOSE BLD MANUAL STRIP-MCNC: 233 MG/DL (ref 74–99)
GLUCOSE BLD MANUAL STRIP-MCNC: 239 MG/DL (ref 74–99)
GLUCOSE BLD MANUAL STRIP-MCNC: 252 MG/DL (ref 74–99)
GLUCOSE BLD MANUAL STRIP-MCNC: 261 MG/DL (ref 74–99)
GLUCOSE BLD MANUAL STRIP-MCNC: 270 MG/DL (ref 74–99)
GLUCOSE BLD MANUAL STRIP-MCNC: 273 MG/DL (ref 74–99)
GLUCOSE BLD MANUAL STRIP-MCNC: 282 MG/DL (ref 74–99)
GLUCOSE BLD MANUAL STRIP-MCNC: 286 MG/DL (ref 74–99)
GLUCOSE BLD MANUAL STRIP-MCNC: 286 MG/DL (ref 74–99)
GLUCOSE BLD MANUAL STRIP-MCNC: 297 MG/DL (ref 74–99)
GLUCOSE BLD MANUAL STRIP-MCNC: 326 MG/DL (ref 74–99)
GLUCOSE BLD MANUAL STRIP-MCNC: 343 MG/DL (ref 74–99)
GLUCOSE BLD MANUAL STRIP-MCNC: 62 MG/DL (ref 74–99)
GLUCOSE BLD MANUAL STRIP-MCNC: 68 MG/DL (ref 74–99)
GLUCOSE BLD MANUAL STRIP-MCNC: 75 MG/DL (ref 74–99)
GLUCOSE BLD MANUAL STRIP-MCNC: 82 MG/DL (ref 74–99)
GLUCOSE BLD MANUAL STRIP-MCNC: 85 MG/DL (ref 74–99)
GLUCOSE BLD MANUAL STRIP-MCNC: 87 MG/DL (ref 74–99)
GLUCOSE BLD MANUAL STRIP-MCNC: 88 MG/DL (ref 74–99)
GLUCOSE BLD MANUAL STRIP-MCNC: 89 MG/DL (ref 74–99)
GLUCOSE BLD MANUAL STRIP-MCNC: 92 MG/DL (ref 74–99)
GLUCOSE BLD MANUAL STRIP-MCNC: 92 MG/DL (ref 74–99)
GLUCOSE BLD MANUAL STRIP-MCNC: 93 MG/DL (ref 74–99)
GLUCOSE BLD MANUAL STRIP-MCNC: 95 MG/DL (ref 74–99)
GLUCOSE SERPL-MCNC: 103 MG/DL (ref 74–99)
GLUCOSE SERPL-MCNC: 111 MG/DL (ref 74–99)
GLUCOSE SERPL-MCNC: 133 MG/DL (ref 74–99)
GLUCOSE SERPL-MCNC: 134 MG/DL (ref 74–99)
GLUCOSE SERPL-MCNC: 162 MG/DL (ref 74–99)
GLUCOSE SERPL-MCNC: 179 MG/DL (ref 74–99)
GLUCOSE SERPL-MCNC: 211 MG/DL (ref 74–99)
GLUCOSE SERPL-MCNC: 215 MG/DL (ref 74–99)
GLUCOSE SERPL-MCNC: 249 MG/DL (ref 74–99)
GLUCOSE SERPL-MCNC: 275 MG/DL (ref 74–99)
GLUCOSE SERPL-MCNC: 290 MG/DL (ref 74–99)
GLUCOSE SERPL-MCNC: 332 MG/DL (ref 74–99)
GLUCOSE SERPL-MCNC: 78 MG/DL (ref 74–99)
GLUCOSE SERPL-MCNC: 84 MG/DL (ref 74–99)
GLUCOSE SERPL-MCNC: 84 MG/DL (ref 74–99)
GLUCOSE SERPL-MCNC: 89 MG/DL (ref 74–99)
GLUCOSE SERPL-MCNC: 89 MG/DL (ref 74–99)
GLUCOSE SERPL-MCNC: 94 MG/DL (ref 74–99)
GLUCOSE SERPL-MCNC: 97 MG/DL (ref 74–99)
GLUCOSE UR STRIP.AUTO-MCNC: NEGATIVE MG/DL
GLUCOSE, URINE: NEGATIVE MG/DL
GLUCOSE, URINE: NEGATIVE MG/DL
GRAM STN SPEC: ABNORMAL
HBA1C MFR BLD: 6.9 %
HBV SURFACE AB SER-ACNC: <3.1 MIU/ML
HCT VFR BLD AUTO: 28.2 % (ref 36–46)
HCT VFR BLD AUTO: 28.3 % (ref 36–46)
HCT VFR BLD AUTO: 28.7 % (ref 36–46)
HCT VFR BLD AUTO: 28.9 % (ref 36–46)
HCT VFR BLD AUTO: 29.7 % (ref 36–46)
HCT VFR BLD AUTO: 32.4 % (ref 36–46)
HCT VFR BLD AUTO: 32.8 % (ref 36–46)
HCT VFR BLD AUTO: 33.1 % (ref 36–46)
HCT VFR BLD AUTO: 33.8 % (ref 36–46)
HCT VFR BLD AUTO: 34.1 % (ref 36–46)
HCT VFR BLD AUTO: 34.4 % (ref 36–46)
HCT VFR BLD AUTO: 35.7 % (ref 36–46)
HCT VFR BLD AUTO: 35.9 % (ref 36–46)
HCT VFR BLD AUTO: 35.9 % (ref 36–46)
HCT VFR BLD AUTO: 36.1 % (ref 36–46)
HCT VFR BLD AUTO: 36.5 % (ref 36–46)
HCT VFR BLD AUTO: 36.7 % (ref 36–46)
HCV AB SER QL: NONREACTIVE
HEMATOCRIT (%) IN BLOOD BY AUTOMATED COUNT: 35.8 % (ref 36–46)
HEMATOCRIT (%) IN BLOOD BY AUTOMATED COUNT: 38 % (ref 36–46)
HEMOGLOBIN (G/DL) IN BLOOD: 11.2 G/DL (ref 12–16)
HEMOGLOBIN (G/DL) IN BLOOD: 12 G/DL (ref 12–16)
HGB BLD-MCNC: 10.2 G/DL (ref 12–16)
HGB BLD-MCNC: 10.3 G/DL (ref 12–16)
HGB BLD-MCNC: 10.6 G/DL (ref 12–16)
HGB BLD-MCNC: 10.6 G/DL (ref 12–16)
HGB BLD-MCNC: 10.8 G/DL (ref 12–16)
HGB BLD-MCNC: 11 G/DL (ref 12–16)
HGB BLD-MCNC: 11.1 G/DL (ref 12–16)
HGB BLD-MCNC: 11.1 G/DL (ref 12–16)
HGB BLD-MCNC: 11.4 G/DL (ref 12–16)
HGB BLD-MCNC: 11.5 G/DL (ref 12–16)
HGB BLD-MCNC: 11.8 G/DL (ref 12–16)
HGB BLD-MCNC: 8.3 G/DL (ref 12–16)
HGB BLD-MCNC: 8.4 G/DL (ref 12–16)
HGB BLD-MCNC: 8.6 G/DL (ref 12–16)
HGB BLD-MCNC: 8.8 G/DL (ref 12–16)
HGB BLD-MCNC: 9 G/DL (ref 12–16)
HGB BLD-MCNC: 9.5 G/DL (ref 12–16)
HIV 1+2 AB+HIV1 P24 AG SERPL QL IA: NONREACTIVE
HOLD SPECIMEN: NORMAL
HYALINE CASTS #/AREA URNS AUTO: ABNORMAL /LPF
HYALINE CASTS, URINE: ABNORMAL /LPF
IGA SERPL-MCNC: 170 MG/DL (ref 70–400)
IGE SERPL-ACNC: 1270 IU/ML (ref 0–214)
IGG SERPL-MCNC: 757 MG/DL (ref 700–1600)
IGM SERPL-MCNC: 112 MG/DL (ref 40–230)
IMM GRANULOCYTES # BLD AUTO: 0.14 X10*3/UL (ref 0–0.5)
IMM GRANULOCYTES # BLD AUTO: 0.14 X10*3/UL (ref 0–0.5)
IMM GRANULOCYTES # BLD AUTO: 0.19 X10*3/UL (ref 0–0.5)
IMM GRANULOCYTES # BLD AUTO: 0.21 X10*3/UL (ref 0–0.5)
IMM GRANULOCYTES # BLD AUTO: 0.25 X10*3/UL (ref 0–0.5)
IMM GRANULOCYTES # BLD AUTO: 0.37 X10*3/UL (ref 0–0.5)
IMM GRANULOCYTES # BLD AUTO: 0.38 X10*3/UL (ref 0–0.5)
IMM GRANULOCYTES # BLD AUTO: 0.7 X10*3/UL (ref 0–0.5)
IMM GRANULOCYTES # BLD AUTO: 0.97 X10*3/UL (ref 0–0.5)
IMM GRANULOCYTES # BLD AUTO: 1.19 X10*3/UL (ref 0–0.5)
IMM GRANULOCYTES NFR BLD AUTO: 1.1 % (ref 0–0.9)
IMM GRANULOCYTES NFR BLD AUTO: 1.4 % (ref 0–0.9)
IMM GRANULOCYTES NFR BLD AUTO: 2 % (ref 0–0.9)
IMM GRANULOCYTES NFR BLD AUTO: 2.1 % (ref 0–0.9)
IMM GRANULOCYTES NFR BLD AUTO: 2.2 % (ref 0–0.9)
IMM GRANULOCYTES NFR BLD AUTO: 2.2 % (ref 0–0.9)
IMM GRANULOCYTES NFR BLD AUTO: 3.1 % (ref 0–0.9)
IMM GRANULOCYTES NFR BLD AUTO: 3.2 % (ref 0–0.9)
IMM GRANULOCYTES NFR BLD AUTO: 3.5 % (ref 0–0.9)
IMM GRANULOCYTES NFR BLD AUTO: 5.3 % (ref 0–0.9)
KETONES UR STRIP.AUTO-MCNC: ABNORMAL MG/DL
KETONES UR STRIP.AUTO-MCNC: NEGATIVE MG/DL
KETONES UR STRIP.AUTO-MCNC: NEGATIVE MG/DL
KETONES, URINE: NEGATIVE MG/DL
KETONES, URINE: NEGATIVE MG/DL
LAB TEST METHOD: NORMAL
LACTATE SERPL-SCNC: 1.9 MMOL/L (ref 0.4–2)
LDL: 127 MG/DL (ref 0–99)
LEFT VENTRICLE INTERNAL DIMENSION DIASTOLE: 3.29 (ref 3.5–6)
LEUKOCYTE ESTERASE UR QL STRIP.AUTO: ABNORMAL
LEUKOCYTE ESTERASE UR QL STRIP.AUTO: ABNORMAL
LEUKOCYTE ESTERASE UR QL STRIP.AUTO: NEGATIVE
LEUKOCYTE ESTERASE, URINE: ABNORMAL
LEUKOCYTE ESTERASE, URINE: ABNORMAL
LEUKOCYTES (10*3/UL) IN BLOOD BY AUTOMATED COUNT: 7.6 X10E9/L (ref 4.4–11.3)
LEUKOCYTES (10*3/UL) IN BLOOD BY AUTOMATED COUNT: 7.8 X10E9/L (ref 4.4–11.3)
LYMPHOCYTES # BLD AUTO: 0.96 X10*3/UL (ref 0.8–3)
LYMPHOCYTES # BLD AUTO: 2.04 X10*3/UL (ref 0.8–3)
LYMPHOCYTES # BLD AUTO: 2.05 X10*3/UL (ref 0.8–3)
LYMPHOCYTES # BLD AUTO: 2.1 X10*3/UL (ref 0.8–3)
LYMPHOCYTES # BLD AUTO: 2.12 X10*3/UL (ref 0.8–3)
LYMPHOCYTES # BLD AUTO: 2.12 X10*3/UL (ref 0.8–3)
LYMPHOCYTES # BLD AUTO: 2.18 X10*3/UL (ref 0.8–3)
LYMPHOCYTES # BLD AUTO: 2.46 X10*3/UL (ref 0.8–3)
LYMPHOCYTES # BLD AUTO: 2.67 X10*3/UL (ref 0.8–3)
LYMPHOCYTES # BLD MANUAL: 3.84 X10*3/UL (ref 0.8–3)
LYMPHOCYTES NFR BLD AUTO: 17 %
LYMPHOCYTES NFR BLD AUTO: 17.7 %
LYMPHOCYTES NFR BLD AUTO: 18.3 %
LYMPHOCYTES NFR BLD AUTO: 20.3 %
LYMPHOCYTES NFR BLD AUTO: 26 %
LYMPHOCYTES NFR BLD AUTO: 27.4 %
LYMPHOCYTES NFR BLD AUTO: 6.3 %
LYMPHOCYTES NFR BLD AUTO: 6.7 %
LYMPHOCYTES NFR BLD AUTO: 9.6 %
LYMPHOCYTES NFR BLD MANUAL: 17 %
MAGNESIUM (MG/DL) IN SER/PLAS: 2.42 MG/DL (ref 1.6–2.4)
MAGNESIUM SERPL-MCNC: 1.68 MG/DL (ref 1.6–2.4)
MAGNESIUM SERPL-MCNC: 1.72 MG/DL (ref 1.6–2.4)
MAGNESIUM SERPL-MCNC: 1.78 MG/DL (ref 1.6–2.4)
MAGNESIUM SERPL-MCNC: 2.03 MG/DL (ref 1.6–2.4)
MAGNESIUM SERPL-MCNC: 2.12 MG/DL (ref 1.6–2.4)
MAGNESIUM SERPL-MCNC: 2.12 MG/DL (ref 1.6–2.4)
MAGNESIUM SERPL-MCNC: 2.17 MG/DL (ref 1.6–2.4)
MAGNESIUM SERPL-MCNC: 2.27 MG/DL (ref 1.6–2.4)
MAGNESIUM SERPL-MCNC: 2.38 MG/DL (ref 1.6–2.4)
MAGNESIUM SERPL-MCNC: 2.38 MG/DL (ref 1.6–2.4)
MCH RBC QN AUTO: 28.5 PG (ref 26–34)
MCH RBC QN AUTO: 28.5 PG (ref 26–34)
MCH RBC QN AUTO: 28.6 PG (ref 26–34)
MCH RBC QN AUTO: 28.6 PG (ref 26–34)
MCH RBC QN AUTO: 28.7 PG (ref 26–34)
MCH RBC QN AUTO: 28.8 PG (ref 26–34)
MCH RBC QN AUTO: 28.9 PG (ref 26–34)
MCH RBC QN AUTO: 29 PG (ref 26–34)
MCH RBC QN AUTO: 29.1 PG (ref 26–34)
MCH RBC QN AUTO: 29.1 PG (ref 26–34)
MCH RBC QN AUTO: 29.3 PG (ref 26–34)
MCH RBC QN AUTO: 29.4 PG (ref 26–34)
MCH RBC QN AUTO: 29.6 PG (ref 26–34)
MCHC RBC AUTO-ENTMCNC: 28.7 G/DL (ref 32–36)
MCHC RBC AUTO-ENTMCNC: 29.3 G/DL (ref 32–36)
MCHC RBC AUTO-ENTMCNC: 29.4 G/DL (ref 32–36)
MCHC RBC AUTO-ENTMCNC: 30.2 G/DL (ref 32–36)
MCHC RBC AUTO-ENTMCNC: 30.2 G/DL (ref 32–36)
MCHC RBC AUTO-ENTMCNC: 30.3 G/DL (ref 32–36)
MCHC RBC AUTO-ENTMCNC: 30.3 G/DL (ref 32–36)
MCHC RBC AUTO-ENTMCNC: 30.4 G/DL (ref 32–36)
MCHC RBC AUTO-ENTMCNC: 30.4 G/DL (ref 32–36)
MCHC RBC AUTO-ENTMCNC: 30.7 G/DL (ref 32–36)
MCHC RBC AUTO-ENTMCNC: 30.8 G/DL (ref 32–36)
MCHC RBC AUTO-ENTMCNC: 31.8 G/DL (ref 32–36)
MCHC RBC AUTO-ENTMCNC: 31.8 G/DL (ref 32–36)
MCHC RBC AUTO-ENTMCNC: 32 G/DL (ref 32–36)
MCHC RBC AUTO-ENTMCNC: 32.3 G/DL (ref 32–36)
MCV RBC AUTO: 100 FL (ref 80–100)
MCV RBC AUTO: 90 FL (ref 80–100)
MCV RBC AUTO: 91 FL (ref 80–100)
MCV RBC AUTO: 92 FL (ref 80–100)
MCV RBC AUTO: 93 FL (ref 80–100)
MCV RBC AUTO: 94 FL (ref 80–100)
MCV RBC AUTO: 95 FL (ref 80–100)
MCV RBC AUTO: 95 FL (ref 80–100)
MCV RBC AUTO: 96 FL (ref 80–100)
MCV RBC AUTO: 97 FL (ref 80–100)
MCV RBC AUTO: 98 FL (ref 80–100)
METAMYELOCYTES # BLD MANUAL: 0.45 X10*3/UL
METAMYELOCYTES NFR BLD MANUAL: 2 %
MITRAL VALVE E/A RATIO: 2.42
MITRAL VALVE E/E' RATIO: 42.9
MONOCYTES # BLD AUTO: 0.3 X10*3/UL (ref 0.05–0.8)
MONOCYTES # BLD AUTO: 0.61 X10*3/UL (ref 0.05–0.8)
MONOCYTES # BLD AUTO: 0.84 X10*3/UL (ref 0.05–0.8)
MONOCYTES # BLD AUTO: 0.89 X10*3/UL (ref 0.05–0.8)
MONOCYTES # BLD AUTO: 0.92 X10*3/UL (ref 0.05–0.8)
MONOCYTES # BLD AUTO: 0.94 X10*3/UL (ref 0.05–0.8)
MONOCYTES # BLD AUTO: 0.95 X10*3/UL (ref 0.05–0.8)
MONOCYTES # BLD AUTO: 0.98 X10*3/UL (ref 0.05–0.8)
MONOCYTES # BLD AUTO: 1.43 X10*3/UL (ref 0.05–0.8)
MONOCYTES # BLD MANUAL: 0.45 X10*3/UL (ref 0.05–0.8)
MONOCYTES NFR BLD AUTO: 10.5 %
MONOCYTES NFR BLD AUTO: 3 %
MONOCYTES NFR BLD AUTO: 3.1 %
MONOCYTES NFR BLD AUTO: 4.4 %
MONOCYTES NFR BLD AUTO: 5.5 %
MONOCYTES NFR BLD AUTO: 6.7 %
MONOCYTES NFR BLD AUTO: 7.8 %
MONOCYTES NFR BLD AUTO: 8.3 %
MONOCYTES NFR BLD AUTO: 9.2 %
MONOCYTES NFR BLD MANUAL: 2 %
MUCUS, URINE: ABNORMAL /LPF
MUCUS, URINE: ABNORMAL /LPF
NEUTROPHILS # BLD AUTO: 27.47 X10*3/UL (ref 1.6–5.5)
NEUTROPHILS # BLD AUTO: 27.57 X10*3/UL (ref 1.6–5.5)
NEUTROPHILS # BLD AUTO: 4.61 X10*3/UL (ref 1.6–5.5)
NEUTROPHILS # BLD AUTO: 5.71 X10*3/UL (ref 1.6–5.5)
NEUTROPHILS # BLD AUTO: 6.32 X10*3/UL (ref 1.6–5.5)
NEUTROPHILS # BLD AUTO: 7.56 X10*3/UL (ref 1.6–5.5)
NEUTROPHILS # BLD AUTO: 7.63 X10*3/UL (ref 1.6–5.5)
NEUTROPHILS # BLD AUTO: 7.99 X10*3/UL (ref 1.6–5.5)
NEUTROPHILS # BLD AUTO: 9.01 X10*3/UL (ref 1.6–5.5)
NEUTROPHILS # BLD MANUAL: 17.85 X10*3/UL (ref 1.6–5.5)
NEUTROPHILS NFR BLD AUTO: 51.4 %
NEUTROPHILS NFR BLD AUTO: 55.6 %
NEUTROPHILS NFR BLD AUTO: 58.9 %
NEUTROPHILS NFR BLD AUTO: 63.6 %
NEUTROPHILS NFR BLD AUTO: 68.6 %
NEUTROPHILS NFR BLD AUTO: 72.1 %
NEUTROPHILS NFR BLD AUTO: 79.5 %
NEUTROPHILS NFR BLD AUTO: 85 %
NEUTROPHILS NFR BLD AUTO: 86.6 %
NEUTS BAND # BLD MANUAL: 1.58 X10*3/UL (ref 0–0.5)
NEUTS BAND NFR BLD MANUAL: 7 %
NEUTS SEG # BLD MANUAL: 16.27 X10*3/UL (ref 1.6–5)
NEUTS SEG NFR BLD MANUAL: 72 %
NITRITE UR QL STRIP.AUTO: NEGATIVE
NITRITE, URINE: NEGATIVE
NITRITE, URINE: NEGATIVE
NOROVIRUS GI + GII RNA STL NAA+PROBE: NOT DETECTED
NRBC BLD-RTO: 0 /100 WBCS (ref 0–0)
NRBC BLD-RTO: 0.1 /100 WBCS (ref 0–0)
NUMBER OF CELLS COLLECTED: NORMAL PER TUBE
OSMOLALITY SERPL: 308 MOSM/KG (ref 280–300)
OSMOLALITY UR: 268 MOSM/KG (ref 200–1200)
PARATHYRIN INTACT (PG/ML) IN SER/PLAS: 47.2 PG/ML (ref 18.5–88)
PARATHYRIN INTACT (PG/ML) IN SER/PLAS: 72 PG/ML (ref 18.5–88)
PATH REPORT.TOTAL CANCER: NORMAL
PH UR STRIP.AUTO: 5 [PH]
PH UR STRIP.AUTO: 5 [PH]
PH UR STRIP.AUTO: 6 [PH]
PH, URINE: 6 (ref 5–8)
PH, URINE: 6 (ref 5–8)
PHOSPHATE (MG/DL) IN SER/PLAS: 3.9 MG/DL (ref 2.5–4.9)
PHOSPHATE (MG/DL) IN SER/PLAS: 3.9 MG/DL (ref 2.5–4.9)
PHOSPHATE (MG/DL) IN SER/PLAS: 4 MG/DL (ref 2.5–4.9)
PHOSPHATE (MG/DL) IN SER/PLAS: 4.2 MG/DL (ref 2.5–4.9)
PHOSPHATE SERPL-MCNC: 3.5 MG/DL (ref 2.5–4.9)
PHOSPHATE SERPL-MCNC: 3.6 MG/DL (ref 2.5–4.9)
PHOSPHATE SERPL-MCNC: 4.8 MG/DL (ref 2.5–4.9)
PHOSPHATE SERPL-MCNC: 5.5 MG/DL (ref 2.5–4.9)
PHOSPHATE SERPL-MCNC: 5.6 MG/DL (ref 2.5–4.9)
PHOSPHATE SERPL-MCNC: 5.8 MG/DL (ref 2.5–4.9)
PHOSPHATE SERPL-MCNC: 5.9 MG/DL (ref 2.5–4.9)
PHOSPHATE SERPL-MCNC: 6.1 MG/DL (ref 2.5–4.9)
PLATELET # BLD AUTO: 261 X10*3/UL (ref 150–450)
PLATELET # BLD AUTO: 311 X10*3/UL (ref 150–450)
PLATELET # BLD AUTO: 311 X10*3/UL (ref 150–450)
PLATELET # BLD AUTO: 325 X10*3/UL (ref 150–450)
PLATELET # BLD AUTO: 325 X10*3/UL (ref 150–450)
PLATELET # BLD AUTO: 326 X10*3/UL (ref 150–450)
PLATELET # BLD AUTO: 328 X10*3/UL (ref 150–450)
PLATELET # BLD AUTO: 328 X10*3/UL (ref 150–450)
PLATELET # BLD AUTO: 334 X10*3/UL (ref 150–450)
PLATELET # BLD AUTO: 342 X10*3/UL (ref 150–450)
PLATELET # BLD AUTO: 346 X10*3/UL (ref 150–450)
PLATELET # BLD AUTO: 350 X10*3/UL (ref 150–450)
PLATELET # BLD AUTO: 351 X10*3/UL (ref 150–450)
PLATELET # BLD AUTO: 356 X10*3/UL (ref 150–450)
PLATELET # BLD AUTO: 388 X10*3/UL (ref 150–450)
PLATELET # BLD AUTO: 450 X10*3/UL (ref 150–450)
PLATELET # BLD AUTO: 571 X10*3/UL (ref 150–450)
PLATELETS (10*3/UL) IN BLOOD AUTOMATED COUNT: 245 X10E9/L (ref 150–450)
PLATELETS (10*3/UL) IN BLOOD AUTOMATED COUNT: 310 X10E9/L (ref 150–450)
PMV BLD AUTO: 10.3 FL (ref 7.5–11.5)
PMV BLD AUTO: 10.4 FL (ref 7.5–11.5)
PMV BLD AUTO: 10.6 FL (ref 7.5–11.5)
PMV BLD AUTO: 10.7 FL (ref 7.5–11.5)
PMV BLD AUTO: 10.7 FL (ref 7.5–11.5)
PMV BLD AUTO: 10.8 FL (ref 7.5–11.5)
POC HEMOGLOBIN A1C: 7 % (ref 4.2–6.5)
POLYCHROMASIA BLD QL SMEAR: ABNORMAL
POTASSIUM (MMOL/L) IN SER/PLAS: 3.8 MMOL/L (ref 3.5–5.3)
POTASSIUM (MMOL/L) IN SER/PLAS: 4 MMOL/L (ref 3.5–5.3)
POTASSIUM (MMOL/L) IN SER/PLAS: 4.1 MMOL/L (ref 3.5–5.3)
POTASSIUM (MMOL/L) IN SER/PLAS: 4.2 MMOL/L (ref 3.5–5.3)
POTASSIUM SERPL-SCNC: 3.3 MMOL/L (ref 3.5–5.3)
POTASSIUM SERPL-SCNC: 3.7 MMOL/L (ref 3.5–5.3)
POTASSIUM SERPL-SCNC: 3.8 MMOL/L (ref 3.5–5.3)
POTASSIUM SERPL-SCNC: 4 MMOL/L (ref 3.5–5.3)
POTASSIUM SERPL-SCNC: 4.1 MMOL/L (ref 3.5–5.3)
POTASSIUM SERPL-SCNC: 4.1 MMOL/L (ref 3.5–5.3)
POTASSIUM SERPL-SCNC: 4.2 MMOL/L (ref 3.5–5.3)
POTASSIUM SERPL-SCNC: 4.5 MMOL/L (ref 3.5–5.3)
POTASSIUM SERPL-SCNC: 4.7 MMOL/L (ref 3.5–5.3)
POTASSIUM SERPL-SCNC: 4.9 MMOL/L (ref 3.5–5.3)
POTASSIUM SERPL-SCNC: 4.9 MMOL/L (ref 3.5–5.3)
POTASSIUM SERPL-SCNC: 5 MMOL/L (ref 3.5–5.3)
POTASSIUM SERPL-SCNC: 5 MMOL/L (ref 3.5–5.3)
POTASSIUM SERPL-SCNC: 5.4 MMOL/L (ref 3.5–5.3)
POTASSIUM UR-SCNC: 41 MMOL/L
POTASSIUM UR-SCNC: 98 MMOL/L
POTASSIUM/CREAT UR-RTO: 30 MMOL/G CREAT
POTASSIUM/CREAT UR-RTO: 45 MMOL/G CREAT
PROCALCITONIN SERPL-MCNC: 0.11 NG/ML
PROT SERPL-MCNC: 4.6 G/DL (ref 6.4–8.2)
PROT SERPL-MCNC: 4.8 G/DL (ref 6.4–8.2)
PROT SERPL-MCNC: 5.1 G/DL (ref 6.4–8.2)
PROT SERPL-MCNC: 5.2 G/DL (ref 6.4–8.2)
PROT SERPL-MCNC: 6.2 G/DL (ref 6.4–8.2)
PROT SERPL-MCNC: 6.3 G/DL (ref 6.4–8.2)
PROT SERPL-MCNC: 6.4 G/DL (ref 6.4–8.2)
PROT SERPL-MCNC: 6.4 G/DL (ref 6.4–8.2)
PROT SERPL-MCNC: 6.6 G/DL (ref 6.4–8.2)
PROT SERPL-MCNC: 6.7 G/DL (ref 6.4–8.2)
PROT UR STRIP.AUTO-MCNC: ABNORMAL MG/DL
PROT UR-ACNC: 64 MG/DL (ref 5–24)
PROT/CREAT UR: 0.52 MG/MG CREAT (ref 0–0.17)
PROTEIN (MG/DL) IN URINE: 14 MG/DL (ref 5–24)
PROTEIN (MG/DL) IN URINE: 40 MG/DL (ref 5–24)
PROTEIN, URINE: ABNORMAL MG/DL
PROTEIN, URINE: NEGATIVE MG/DL
PROTEIN/CREATININE (MG/MG) IN URINE: 0.23 MG/MG CREAT (ref 0–0.17)
PROTEIN/CREATININE (MG/MG) IN URINE: 0.62 MG/MG CREAT (ref 0–0.17)
Q ONSET: 179 MS
Q ONSET: 200 MS
Q ONSET: 201 MS
Q ONSET: 210 MS
QRS COUNT: 11 BEATS
QRS COUNT: 12 BEATS
QRS COUNT: 20 BEATS
QRS COUNT: 21 BEATS
QRS DURATION: 108 MS
QRS DURATION: 118 MS
QRS DURATION: 128 MS
QRS DURATION: 140 MS
QT INTERVAL: 332 MS
QT INTERVAL: 346 MS
QT INTERVAL: 392 MS
QT INTERVAL: 436 MS
QTC CALCULATION(BAZETT): 452 MS
QTC CALCULATION(BAZETT): 473 MS
QTC CALCULATION(BAZETT): 489 MS
QTC CALCULATION(BAZETT): 490 MS
QTC FREDERICIA: 431 MS
QTC FREDERICIA: 431 MS
QTC FREDERICIA: 435 MS
QTC FREDERICIA: 461 MS
R AXIS: -41 DEGREES
R AXIS: -42 DEGREES
R AXIS: -45 DEGREES
R AXIS: -53 DEGREES
RBC # BLD AUTO: 2.9 X10*6/UL (ref 4–5.2)
RBC # BLD AUTO: 2.94 X10*6/UL (ref 4–5.2)
RBC # BLD AUTO: 3 X10*6/UL (ref 4–5.2)
RBC # BLD AUTO: 3.07 X10*6/UL (ref 4–5.2)
RBC # BLD AUTO: 3.16 X10*6/UL (ref 4–5.2)
RBC # BLD AUTO: 3.3 X10*6/UL (ref 4–5.2)
RBC # BLD AUTO: 3.54 X10*6/UL (ref 4–5.2)
RBC # BLD AUTO: 3.57 X10*6/UL (ref 4–5.2)
RBC # BLD AUTO: 3.65 X10*6/UL (ref 4–5.2)
RBC # BLD AUTO: 3.68 X10*6/UL (ref 4–5.2)
RBC # BLD AUTO: 3.76 X10*6/UL (ref 4–5.2)
RBC # BLD AUTO: 3.78 X10*6/UL (ref 4–5.2)
RBC # BLD AUTO: 3.79 X10*6/UL (ref 4–5.2)
RBC # BLD AUTO: 3.85 X10*6/UL (ref 4–5.2)
RBC # BLD AUTO: 3.89 X10*6/UL (ref 4–5.2)
RBC # BLD AUTO: 3.91 X10*6/UL (ref 4–5.2)
RBC # BLD AUTO: 4.06 X10*6/UL (ref 4–5.2)
RBC # UR STRIP.AUTO: ABNORMAL /UL
RBC # UR STRIP.AUTO: NEGATIVE /UL
RBC # UR STRIP.AUTO: NEGATIVE /UL
RBC #/AREA URNS AUTO: ABNORMAL /HPF
RBC #/AREA URNS AUTO: ABNORMAL /HPF
RBC #/AREA URNS AUTO: NORMAL /HPF
RBC MORPH BLD: ABNORMAL
RBC, URINE: 1 /HPF (ref 0–5)
RBC, URINE: <1 /HPF (ref 0–5)
RV RNA STL NAA+PROBE: NOT DETECTED
SALMONELLA DNA STL QL NAA+PROBE: NOT DETECTED
SARS-COV-2 RNA RESP QL NAA+PROBE: NOT DETECTED
SCHISTOCYTES BLD QL SMEAR: ABNORMAL
SHIGELLA DNA SPEC QL NAA+PROBE: NOT DETECTED
SIGNATURE COMMENT: NORMAL
SODIUM (MMOL/L) IN SER/PLAS: 143 MMOL/L (ref 136–145)
SODIUM (MMOL/L) IN SER/PLAS: 144 MMOL/L (ref 136–145)
SODIUM (MMOL/L) IN SER/PLAS: 144 MMOL/L (ref 136–145)
SODIUM (MMOL/L) IN SER/PLAS: 145 MMOL/L (ref 136–145)
SODIUM SERPL-SCNC: 134 MMOL/L (ref 136–145)
SODIUM SERPL-SCNC: 135 MMOL/L (ref 136–145)
SODIUM SERPL-SCNC: 136 MMOL/L (ref 136–145)
SODIUM SERPL-SCNC: 137 MMOL/L (ref 136–145)
SODIUM SERPL-SCNC: 138 MMOL/L (ref 136–145)
SODIUM SERPL-SCNC: 139 MMOL/L (ref 136–145)
SODIUM SERPL-SCNC: 140 MMOL/L (ref 136–145)
SODIUM SERPL-SCNC: 140 MMOL/L (ref 136–145)
SODIUM SERPL-SCNC: 141 MMOL/L (ref 136–145)
SODIUM SERPL-SCNC: 142 MMOL/L (ref 136–145)
SODIUM SERPL-SCNC: 144 MMOL/L (ref 136–145)
SODIUM UR-SCNC: 16 MMOL/L
SODIUM UR-SCNC: 19 MMOL/L
SODIUM UR-SCNC: 30 MMOL/L
SODIUM/CREAT UR-RTO: 15 MMOL/G CREAT
SODIUM/CREAT UR-RTO: 22 MMOL/G CREAT
SODIUM/CREAT UR-RTO: 7 MMOL/G CREAT
SP GR UR STRIP.AUTO: 1.01
SP GR UR STRIP.AUTO: 1.02
SP GR UR STRIP.AUTO: 1.02
SPECIFIC GRAVITY, URINE: 1.01 (ref 1–1.03)
SPECIFIC GRAVITY, URINE: 1.02 (ref 1–1.03)
SPECIMEN VIABILITY: NORMAL
SQUAMOUS EPITHELIAL CELLS, URINE: <1 /HPF
T AXIS: -27 DEGREES
T AXIS: -33 DEGREES
T AXIS: 2 DEGREES
T AXIS: 46 DEGREES
T OFFSET: 352 MS
T OFFSET: 366 MS
T OFFSET: 397 MS
T OFFSET: 428 MS
TOTAL CELLS COUNTED BLD: 100
TRIGLYCERIDE (MG/DL) IN SER/PLAS: 90 MG/DL (ref 0–149)
UREA NITROGEN (MG/DL) IN SER/PLAS: 45 MG/DL (ref 6–23)
UREA NITROGEN (MG/DL) IN SER/PLAS: 52 MG/DL (ref 6–23)
UREA NITROGEN (MG/DL) IN SER/PLAS: 60 MG/DL (ref 6–23)
UREA NITROGEN (MG/DL) IN SER/PLAS: 62 MG/DL (ref 6–23)
UROBILINOGEN UR STRIP.AUTO-MCNC: <2 MG/DL
UROBILINOGEN, URINE: <2 MG/DL (ref 0–1.9)
UROBILINOGEN, URINE: <2 MG/DL (ref 0–1.9)
V CHOLERAE DNA STL QL NAA+PROBE: NOT DETECTED
VENTRICULAR RATE: 120 BPM
VENTRICULAR RATE: 131 BPM
VENTRICULAR RATE: 71 BPM
VENTRICULAR RATE: 80 BPM
VLDL: 18 MG/DL (ref 0–40)
WBC # BLD AUTO: 10 X10*3/UL (ref 4.4–11.3)
WBC # BLD AUTO: 10.3 X10*3/UL (ref 4.4–11.3)
WBC # BLD AUTO: 10.7 X10*3/UL (ref 4.4–11.3)
WBC # BLD AUTO: 10.7 X10*3/UL (ref 4.4–11.3)
WBC # BLD AUTO: 11.1 X10*3/UL (ref 4.4–11.3)
WBC # BLD AUTO: 11.7 X10*3/UL (ref 4.4–11.3)
WBC # BLD AUTO: 11.9 X10*3/UL (ref 4.4–11.3)
WBC # BLD AUTO: 12.5 X10*3/UL (ref 4.4–11.3)
WBC # BLD AUTO: 16.5 X10*3/UL (ref 4.4–11.3)
WBC # BLD AUTO: 17.5 X10*3/UL (ref 4.4–11.3)
WBC # BLD AUTO: 18.2 X10*3/UL (ref 4.4–11.3)
WBC # BLD AUTO: 22.6 X10*3/UL (ref 4.4–11.3)
WBC # BLD AUTO: 29.8 X10*3/UL (ref 4.4–11.3)
WBC # BLD AUTO: 31.7 X10*3/UL (ref 4.4–11.3)
WBC # BLD AUTO: 32.4 X10*3/UL (ref 4.4–11.3)
WBC # BLD AUTO: 9 X10*3/UL (ref 4.4–11.3)
WBC # BLD AUTO: 9.6 X10*3/UL (ref 4.4–11.3)
WBC #/AREA URNS AUTO: ABNORMAL /HPF
WBC #/AREA URNS AUTO: ABNORMAL /HPF
WBC #/AREA URNS AUTO: NORMAL /HPF
WBC, URINE: 4 /HPF (ref 0–5)
WBC, URINE: 5 /HPF (ref 0–5)
Y ENTEROCOL DNA STL QL NAA+PROBE: NOT DETECTED

## 2023-01-01 PROCEDURE — 2500000004 HC RX 250 GENERAL PHARMACY W/ HCPCS (ALT 636 FOR OP/ED)

## 2023-01-01 PROCEDURE — 87493 C DIFF AMPLIFIED PROBE: CPT | Mod: STJLAB,91

## 2023-01-01 PROCEDURE — 2500000001 HC RX 250 WO HCPCS SELF ADMINISTERED DRUGS (ALT 637 FOR MEDICARE OP)

## 2023-01-01 PROCEDURE — 82947 ASSAY GLUCOSE BLOOD QUANT: CPT

## 2023-01-01 PROCEDURE — 86160 COMPLEMENT ANTIGEN: CPT

## 2023-01-01 PROCEDURE — 99233 SBSQ HOSP IP/OBS HIGH 50: CPT

## 2023-01-01 PROCEDURE — 96372 THER/PROPH/DIAG INJ SC/IM: CPT

## 2023-01-01 PROCEDURE — 83930 ASSAY OF BLOOD OSMOLALITY: CPT | Mod: STJLAB

## 2023-01-01 PROCEDURE — 1160F RVW MEDS BY RX/DR IN RCRD: CPT | Performed by: INTERNAL MEDICINE

## 2023-01-01 PROCEDURE — 83036 HEMOGLOBIN GLYCOSYLATED A1C: CPT | Mod: CMCLAB

## 2023-01-01 PROCEDURE — 85025 COMPLETE CBC W/AUTO DIFF WBC: CPT

## 2023-01-01 PROCEDURE — 87389 HIV-1 AG W/HIV-1&-2 AB AG IA: CPT

## 2023-01-01 PROCEDURE — 82947 ASSAY GLUCOSE BLOOD QUANT: CPT | Mod: 59

## 2023-01-01 PROCEDURE — 87040 BLOOD CULTURE FOR BACTERIA: CPT | Mod: STJLAB

## 2023-01-01 PROCEDURE — 37799 UNLISTED PX VASCULAR SURGERY: CPT | Mod: TC,STJLAB

## 2023-01-01 PROCEDURE — 36415 COLL VENOUS BLD VENIPUNCTURE: CPT

## 2023-01-01 PROCEDURE — 2500000002 HC RX 250 W HCPCS SELF ADMINISTERED DRUGS (ALT 637 FOR MEDICARE OP, ALT 636 FOR OP/ED)

## 2023-01-01 PROCEDURE — 4010F ACE/ARB THERAPY RXD/TAKEN: CPT | Performed by: PODIATRIST

## 2023-01-01 PROCEDURE — 3074F SYST BP LT 130 MM HG: CPT | Performed by: INTERNAL MEDICINE

## 2023-01-01 PROCEDURE — 93005 ELECTROCARDIOGRAM TRACING: CPT

## 2023-01-01 PROCEDURE — 4010F ACE/ARB THERAPY RXD/TAKEN: CPT | Performed by: INTERNAL MEDICINE

## 2023-01-01 PROCEDURE — 71046 X-RAY EXAM CHEST 2 VIEWS: CPT

## 2023-01-01 PROCEDURE — 3075F SYST BP GE 130 - 139MM HG: CPT | Performed by: INTERNAL MEDICINE

## 2023-01-01 PROCEDURE — 93306 TTE W/DOPPLER COMPLETE: CPT

## 2023-01-01 PROCEDURE — 97116 GAIT TRAINING THERAPY: CPT | Mod: GP,CQ

## 2023-01-01 PROCEDURE — 86706 HEP B SURFACE ANTIBODY: CPT

## 2023-01-01 PROCEDURE — 80053 COMPREHEN METABOLIC PANEL: CPT

## 2023-01-01 PROCEDURE — 84484 ASSAY OF TROPONIN QUANT: CPT

## 2023-01-01 PROCEDURE — 1036F TOBACCO NON-USER: CPT | Performed by: PODIATRIST

## 2023-01-01 PROCEDURE — 1100000001 HC PRIVATE ROOM DAILY

## 2023-01-01 PROCEDURE — 2500000005 HC RX 250 GENERAL PHARMACY W/O HCPCS

## 2023-01-01 PROCEDURE — 11042 DBRDMT SUBQ TIS 1ST 20SQCM/<: CPT | Performed by: PODIATRIST

## 2023-01-01 PROCEDURE — 87075 CULTR BACTERIA EXCEPT BLOOD: CPT | Mod: STJLAB

## 2023-01-01 PROCEDURE — 83935 ASSAY OF URINE OSMOLALITY: CPT | Mod: STJLAB

## 2023-01-01 PROCEDURE — 85027 COMPLETE CBC AUTOMATED: CPT

## 2023-01-01 PROCEDURE — 99214 OFFICE O/P EST MOD 30 MIN: CPT | Performed by: NURSE PRACTITIONER

## 2023-01-01 PROCEDURE — 11045 DBRDMT SUBQ TISS EACH ADDL: CPT | Performed by: PODIATRIST

## 2023-01-01 PROCEDURE — 99232 SBSQ HOSP IP/OBS MODERATE 35: CPT

## 2023-01-01 PROCEDURE — 4010F ACE/ARB THERAPY RXD/TAKEN: CPT | Performed by: NURSE PRACTITIONER

## 2023-01-01 PROCEDURE — 96374 THER/PROPH/DIAG INJ IV PUSH: CPT

## 2023-01-01 PROCEDURE — 99213 OFFICE O/P EST LOW 20 MIN: CPT | Performed by: NURSE PRACTITIONER

## 2023-01-01 PROCEDURE — 76770 US EXAM ABDO BACK WALL COMP: CPT

## 2023-01-01 PROCEDURE — 86803 HEPATITIS C AB TEST: CPT

## 2023-01-01 PROCEDURE — 99285 EMERGENCY DEPT VISIT HI MDM: CPT | Performed by: EMERGENCY MEDICINE

## 2023-01-01 PROCEDURE — 71045 X-RAY EXAM CHEST 1 VIEW: CPT

## 2023-01-01 PROCEDURE — 3066F NEPHROPATHY DOC TX: CPT | Performed by: PODIATRIST

## 2023-01-01 PROCEDURE — 86038 ANTINUCLEAR ANTIBODIES: CPT | Mod: STJLAB

## 2023-01-01 PROCEDURE — 3078F DIAST BP <80 MM HG: CPT | Performed by: INTERNAL MEDICINE

## 2023-01-01 PROCEDURE — 83735 ASSAY OF MAGNESIUM: CPT

## 2023-01-01 PROCEDURE — 83880 ASSAY OF NATRIURETIC PEPTIDE: CPT

## 2023-01-01 PROCEDURE — 3060F POS MICROALBUMINURIA REV: CPT | Performed by: INTERNAL MEDICINE

## 2023-01-01 PROCEDURE — A4217 STERILE WATER/SALINE, 500 ML: HCPCS

## 2023-01-01 PROCEDURE — 1036F TOBACCO NON-USER: CPT | Performed by: NURSE PRACTITIONER

## 2023-01-01 PROCEDURE — 99285 EMERGENCY DEPT VISIT HI MDM: CPT | Mod: 25 | Performed by: STUDENT IN AN ORGANIZED HEALTH CARE EDUCATION/TRAINING PROGRAM

## 2023-01-01 PROCEDURE — 3066F NEPHROPATHY DOC TX: CPT | Performed by: INTERNAL MEDICINE

## 2023-01-01 PROCEDURE — 81001 URINALYSIS AUTO W/SCOPE: CPT

## 2023-01-01 PROCEDURE — 99283 EMERGENCY DEPT VISIT LOW MDM: CPT | Mod: 25 | Performed by: STUDENT IN AN ORGANIZED HEALTH CARE EDUCATION/TRAINING PROGRAM

## 2023-01-01 PROCEDURE — 82553 CREATINE MB FRACTION: CPT | Performed by: INTERNAL MEDICINE

## 2023-01-01 PROCEDURE — 71045 X-RAY EXAM CHEST 1 VIEW: CPT | Performed by: RADIOLOGY

## 2023-01-01 PROCEDURE — 87506 IADNA-DNA/RNA PROBE TQ 6-11: CPT | Mod: STJLAB

## 2023-01-01 PROCEDURE — 1159F MED LIST DOCD IN RCRD: CPT | Performed by: PODIATRIST

## 2023-01-01 PROCEDURE — 84300 ASSAY OF URINE SODIUM: CPT | Performed by: INTERNAL MEDICINE

## 2023-01-01 PROCEDURE — 99285 EMERGENCY DEPT VISIT HI MDM: CPT | Performed by: STUDENT IN AN ORGANIZED HEALTH CARE EDUCATION/TRAINING PROGRAM

## 2023-01-01 PROCEDURE — 1160F RVW MEDS BY RX/DR IN RCRD: CPT | Performed by: PODIATRIST

## 2023-01-01 PROCEDURE — 82085 ASSAY OF ALDOLASE: CPT

## 2023-01-01 PROCEDURE — 82784 ASSAY IGA/IGD/IGG/IGM EACH: CPT | Mod: STJLAB

## 2023-01-01 PROCEDURE — 2500000002 HC RX 250 W HCPCS SELF ADMINISTERED DRUGS (ALT 637 FOR MEDICARE OP, ALT 636 FOR OP/ED): Mod: MUE

## 2023-01-01 PROCEDURE — 86160 COMPLEMENT ANTIGEN: CPT | Mod: STJLAB

## 2023-01-01 PROCEDURE — 88185 FLOWCYTOMETRY/TC ADD-ON: CPT | Mod: TC,STJLAB

## 2023-01-01 PROCEDURE — 80053 COMPREHEN METABOLIC PANEL: CPT | Performed by: STUDENT IN AN ORGANIZED HEALTH CARE EDUCATION/TRAINING PROGRAM

## 2023-01-01 PROCEDURE — 1036F TOBACCO NON-USER: CPT | Performed by: INTERNAL MEDICINE

## 2023-01-01 PROCEDURE — 93308 TTE F-UP OR LMTD: CPT

## 2023-01-01 PROCEDURE — 87070 CULTURE OTHR SPECIMN AEROBIC: CPT

## 2023-01-01 PROCEDURE — 1159F MED LIST DOCD IN RCRD: CPT | Performed by: INTERNAL MEDICINE

## 2023-01-01 PROCEDURE — 97530 THERAPEUTIC ACTIVITIES: CPT | Mod: GO,CO

## 2023-01-01 PROCEDURE — 93010 ELECTROCARDIOGRAM REPORT: CPT | Performed by: INTERNAL MEDICINE

## 2023-01-01 PROCEDURE — 3430000001 HC RX 343 DIAGNOSTIC RADIOPHARMACEUTICALS: Performed by: STUDENT IN AN ORGANIZED HEALTH CARE EDUCATION/TRAINING PROGRAM

## 2023-01-01 PROCEDURE — 99214 OFFICE O/P EST MOD 30 MIN: CPT | Mod: PO | Performed by: INTERNAL MEDICINE

## 2023-01-01 PROCEDURE — 93296 REM INTERROG EVL PM/IDS: CPT

## 2023-01-01 PROCEDURE — 87635 SARS-COV-2 COVID-19 AMP PRB: CPT | Performed by: INTERNAL MEDICINE

## 2023-01-01 PROCEDURE — A9540 TC99M MAA: HCPCS | Performed by: STUDENT IN AN ORGANIZED HEALTH CARE EDUCATION/TRAINING PROGRAM

## 2023-01-01 PROCEDURE — 2500000004 HC RX 250 GENERAL PHARMACY W/ HCPCS (ALT 636 FOR OP/ED): Performed by: INTERNAL MEDICINE

## 2023-01-01 PROCEDURE — 99213 OFFICE O/P EST LOW 20 MIN: CPT | Performed by: PODIATRIST

## 2023-01-01 PROCEDURE — 94760 N-INVAS EAR/PLS OXIMETRY 1: CPT

## 2023-01-01 PROCEDURE — 1126F AMNT PAIN NOTED NONE PRSNT: CPT | Performed by: PODIATRIST

## 2023-01-01 PROCEDURE — 1126F AMNT PAIN NOTED NONE PRSNT: CPT | Performed by: INTERNAL MEDICINE

## 2023-01-01 PROCEDURE — 76770 US EXAM ABDO BACK WALL COMP: CPT | Performed by: RADIOLOGY

## 2023-01-01 PROCEDURE — 82436 ASSAY OF URINE CHLORIDE: CPT | Performed by: INTERNAL MEDICINE

## 2023-01-01 PROCEDURE — 87040 BLOOD CULTURE FOR BACTERIA: CPT | Mod: CMCLAB

## 2023-01-01 PROCEDURE — 88189 FLOWCYTOMETRY/READ 16 & >: CPT

## 2023-01-01 PROCEDURE — 99213 OFFICE O/P EST LOW 20 MIN: CPT | Performed by: INTERNAL MEDICINE

## 2023-01-01 PROCEDURE — 1111F DSCHRG MED/CURRENT MED MERGE: CPT | Performed by: INTERNAL MEDICINE

## 2023-01-01 PROCEDURE — 80069 RENAL FUNCTION PANEL: CPT

## 2023-01-01 PROCEDURE — 99239 HOSP IP/OBS DSCHRG MGMT >30: CPT

## 2023-01-01 PROCEDURE — 36415 COLL VENOUS BLD VENIPUNCTURE: CPT | Performed by: INTERNAL MEDICINE

## 2023-01-01 PROCEDURE — 86162 COMPLEMENT TOTAL (CH50): CPT

## 2023-01-01 PROCEDURE — 87186 SC STD MICRODIL/AGAR DIL: CPT

## 2023-01-01 PROCEDURE — 3044F HG A1C LEVEL LT 7.0%: CPT | Performed by: INTERNAL MEDICINE

## 2023-01-01 PROCEDURE — 2500000001 HC RX 250 WO HCPCS SELF ADMINISTERED DRUGS (ALT 637 FOR MEDICARE OP): Performed by: INTERNAL MEDICINE

## 2023-01-01 PROCEDURE — 87070 CULTURE OTHR SPECIMN AEROBIC: CPT | Mod: 59,STJLAB

## 2023-01-01 PROCEDURE — 84300 ASSAY OF URINE SODIUM: CPT

## 2023-01-01 PROCEDURE — 2500000004 HC RX 250 GENERAL PHARMACY W/ HCPCS (ALT 636 FOR OP/ED): Performed by: STUDENT IN AN ORGANIZED HEALTH CARE EDUCATION/TRAINING PROGRAM

## 2023-01-01 PROCEDURE — 87502 INFLUENZA DNA AMP PROBE: CPT | Performed by: STUDENT IN AN ORGANIZED HEALTH CARE EDUCATION/TRAINING PROGRAM

## 2023-01-01 PROCEDURE — 99222 1ST HOSP IP/OBS MODERATE 55: CPT

## 2023-01-01 PROCEDURE — 82785 ASSAY OF IGE: CPT | Mod: STJLAB

## 2023-01-01 PROCEDURE — 97161 PT EVAL LOW COMPLEX 20 MIN: CPT | Mod: GP

## 2023-01-01 PROCEDURE — 93971 EXTREMITY STUDY: CPT

## 2023-01-01 PROCEDURE — 85652 RBC SED RATE AUTOMATED: CPT

## 2023-01-01 PROCEDURE — 3078F DIAST BP <80 MM HG: CPT | Performed by: NURSE PRACTITIONER

## 2023-01-01 PROCEDURE — 99214 OFFICE O/P EST MOD 30 MIN: CPT | Performed by: INTERNAL MEDICINE

## 2023-01-01 PROCEDURE — 85007 BL SMEAR W/DIFF WBC COUNT: CPT

## 2023-01-01 PROCEDURE — 1200000002 HC GENERAL ROOM WITH TELEMETRY DAILY

## 2023-01-01 PROCEDURE — 82550 ASSAY OF CK (CPK): CPT | Performed by: INTERNAL MEDICINE

## 2023-01-01 PROCEDURE — 82570 ASSAY OF URINE CREATININE: CPT | Performed by: INTERNAL MEDICINE

## 2023-01-01 PROCEDURE — 96365 THER/PROPH/DIAG IV INF INIT: CPT

## 2023-01-01 PROCEDURE — 36415 COLL VENOUS BLD VENIPUNCTURE: CPT | Performed by: STUDENT IN AN ORGANIZED HEALTH CARE EDUCATION/TRAINING PROGRAM

## 2023-01-01 PROCEDURE — 87324 CLOSTRIDIUM AG IA: CPT | Mod: 59,STJLAB

## 2023-01-01 PROCEDURE — 71046 X-RAY EXAM CHEST 2 VIEWS: CPT | Performed by: RADIOLOGY

## 2023-01-01 PROCEDURE — 3074F SYST BP LT 130 MM HG: CPT | Performed by: NURSE PRACTITIONER

## 2023-01-01 PROCEDURE — 78830 RP LOCLZJ TUM SPECT W/CT 1: CPT | Performed by: RADIOLOGY

## 2023-01-01 PROCEDURE — 85025 COMPLETE CBC W/AUTO DIFF WBC: CPT | Performed by: STUDENT IN AN ORGANIZED HEALTH CARE EDUCATION/TRAINING PROGRAM

## 2023-01-01 PROCEDURE — 93294 REM INTERROG EVL PM/LDLS PM: CPT | Performed by: INTERNAL MEDICINE

## 2023-01-01 PROCEDURE — 97110 THERAPEUTIC EXERCISES: CPT | Mod: GP,CQ

## 2023-01-01 PROCEDURE — 93306 TTE W/DOPPLER COMPLETE: CPT | Performed by: INTERNAL MEDICINE

## 2023-01-01 PROCEDURE — 87493 C DIFF AMPLIFIED PROBE: CPT | Mod: STJLAB

## 2023-01-01 PROCEDURE — 87075 CULTR BACTERIA EXCEPT BLOOD: CPT

## 2023-01-01 PROCEDURE — 99223 1ST HOSP IP/OBS HIGH 75: CPT

## 2023-01-01 PROCEDURE — 99214 OFFICE O/P EST MOD 30 MIN: CPT | Performed by: PODIATRIST

## 2023-01-01 PROCEDURE — 97535 SELF CARE MNGMENT TRAINING: CPT | Mod: GO

## 2023-01-01 PROCEDURE — 84484 ASSAY OF TROPONIN QUANT: CPT | Performed by: STUDENT IN AN ORGANIZED HEALTH CARE EDUCATION/TRAINING PROGRAM

## 2023-01-01 PROCEDURE — 99285 EMERGENCY DEPT VISIT HI MDM: CPT | Mod: 25 | Performed by: EMERGENCY MEDICINE

## 2023-01-01 PROCEDURE — 86140 C-REACTIVE PROTEIN: CPT | Performed by: STUDENT IN AN ORGANIZED HEALTH CARE EDUCATION/TRAINING PROGRAM

## 2023-01-01 PROCEDURE — 84075 ASSAY ALKALINE PHOSPHATASE: CPT

## 2023-01-01 PROCEDURE — 84145 PROCALCITONIN (PCT): CPT | Mod: STJLAB

## 2023-01-01 PROCEDURE — 93971 EXTREMITY STUDY: CPT | Performed by: INTERNAL MEDICINE

## 2023-01-01 PROCEDURE — 86140 C-REACTIVE PROTEIN: CPT

## 2023-01-01 PROCEDURE — 76937 US GUIDE VASCULAR ACCESS: CPT

## 2023-01-01 PROCEDURE — 87086 URINE CULTURE/COLONY COUNT: CPT | Mod: STJLAB

## 2023-01-01 PROCEDURE — 96365 THER/PROPH/DIAG IV INF INIT: CPT | Mod: 59

## 2023-01-01 PROCEDURE — 97535 SELF CARE MNGMENT TRAINING: CPT | Mod: GO,CO

## 2023-01-01 PROCEDURE — 78830 RP LOCLZJ TUM SPECT W/CT 1: CPT

## 2023-01-01 PROCEDURE — 82785 ASSAY OF IGE: CPT

## 2023-01-01 PROCEDURE — 97165 OT EVAL LOW COMPLEX 30 MIN: CPT | Mod: GO

## 2023-01-01 PROCEDURE — 99236 HOSP IP/OBS SAME DATE HI 85: CPT

## 2023-01-01 PROCEDURE — 80069 RENAL FUNCTION PANEL: CPT | Mod: CCI

## 2023-01-01 PROCEDURE — 96361 HYDRATE IV INFUSION ADD-ON: CPT

## 2023-01-01 PROCEDURE — 83036 HEMOGLOBIN GLYCOSYLATED A1C: CPT | Performed by: NURSE PRACTITIONER

## 2023-01-01 PROCEDURE — 96374 THER/PROPH/DIAG INJ IV PUSH: CPT | Mod: 59

## 2023-01-01 PROCEDURE — 2500000001 HC RX 250 WO HCPCS SELF ADMINISTERED DRUGS (ALT 637 FOR MEDICARE OP): Performed by: STUDENT IN AN ORGANIZED HEALTH CARE EDUCATION/TRAINING PROGRAM

## 2023-01-01 PROCEDURE — 82436 ASSAY OF URINE CHLORIDE: CPT

## 2023-01-01 PROCEDURE — 96375 TX/PRO/DX INJ NEW DRUG ADDON: CPT

## 2023-01-01 PROCEDURE — 93970 EXTREMITY STUDY: CPT | Performed by: INTERNAL MEDICINE

## 2023-01-01 PROCEDURE — 84100 ASSAY OF PHOSPHORUS: CPT

## 2023-01-01 PROCEDURE — 83605 ASSAY OF LACTIC ACID: CPT

## 2023-01-01 PROCEDURE — 1157F ADVNC CARE PLAN IN RCRD: CPT | Performed by: NURSE PRACTITIONER

## 2023-01-01 PROCEDURE — 3066F NEPHROPATHY DOC TX: CPT | Performed by: NURSE PRACTITIONER

## 2023-01-01 RX ORDER — ASCORBIC ACID 500 MG
500 TABLET ORAL DAILY
COMMUNITY

## 2023-01-01 RX ORDER — MULTIVITAMIN
1 TABLET ORAL DAILY
COMMUNITY
End: 2023-01-01 | Stop reason: ENTERED-IN-ERROR

## 2023-01-01 RX ORDER — INSULIN GLARGINE 100 [IU]/ML
13 INJECTION, SOLUTION SUBCUTANEOUS NIGHTLY
Status: DISCONTINUED | OUTPATIENT
Start: 2023-01-01 | End: 2023-01-01

## 2023-01-01 RX ORDER — LATANOPROST 50 UG/ML
1 SOLUTION/ DROPS OPHTHALMIC DAILY
Qty: 1.5 ML | Refills: 11 | Status: CANCELLED | OUTPATIENT
Start: 2023-01-01 | End: 2024-10-09

## 2023-01-01 RX ORDER — EZETIMIBE 10 MG/1
10 TABLET ORAL DAILY
COMMUNITY

## 2023-01-01 RX ORDER — CETIRIZINE HYDROCHLORIDE 10 MG/1
10 TABLET ORAL DAILY
Qty: 30 TABLET | Refills: 0 | Status: SHIPPED | OUTPATIENT
Start: 2023-01-01 | End: 2023-01-01 | Stop reason: HOSPADM

## 2023-01-01 RX ORDER — LISINOPRIL 40 MG/1
40 TABLET ORAL DAILY
COMMUNITY
Start: 2015-06-01 | End: 2023-01-01 | Stop reason: HOSPADM

## 2023-01-01 RX ORDER — TRIAMCINOLONE ACETONIDE 1 MG/G
1 CREAM TOPICAL 2 TIMES DAILY PRN
COMMUNITY
End: 2023-01-01 | Stop reason: ENTERED-IN-ERROR

## 2023-01-01 RX ORDER — FERROUS SULFATE 325(65) MG
65 TABLET ORAL DAILY
COMMUNITY

## 2023-01-01 RX ORDER — DEXTROSE MONOHYDRATE 100 MG/ML
0.3 INJECTION, SOLUTION INTRAVENOUS ONCE AS NEEDED
Status: DISCONTINUED | OUTPATIENT
Start: 2023-01-01 | End: 2023-01-01 | Stop reason: HOSPADM

## 2023-01-01 RX ORDER — ACETAMINOPHEN 325 MG/1
650 TABLET ORAL ONCE
Status: COMPLETED | OUTPATIENT
Start: 2023-01-01 | End: 2023-01-01

## 2023-01-01 RX ORDER — FUROSEMIDE 40 MG/1
40 TABLET ORAL DAILY
Status: DISCONTINUED | OUTPATIENT
Start: 2023-01-01 | End: 2023-01-01

## 2023-01-01 RX ORDER — DOXYCYCLINE 100 MG/1
100 CAPSULE ORAL EVERY 12 HOURS SCHEDULED
Status: DISCONTINUED | OUTPATIENT
Start: 2023-01-01 | End: 2023-01-01 | Stop reason: HOSPADM

## 2023-01-01 RX ORDER — ASPIRIN 81 MG/1
81 TABLET ORAL DAILY
Status: DISCONTINUED | OUTPATIENT
Start: 2023-01-01 | End: 2023-01-01 | Stop reason: HOSPADM

## 2023-01-01 RX ORDER — LATANOPROST 50 UG/ML
1 SOLUTION/ DROPS OPHTHALMIC DAILY
Status: DISCONTINUED | OUTPATIENT
Start: 2023-01-01 | End: 2023-01-01 | Stop reason: HOSPADM

## 2023-01-01 RX ORDER — LANCETS 26 GAUGE
1 EACH MISCELLANEOUS DAILY
Status: ON HOLD | COMMUNITY
End: 2023-01-01 | Stop reason: ENTERED-IN-ERROR

## 2023-01-01 RX ORDER — POTASSIUM CHLORIDE 20 MEQ/1
10 TABLET, EXTENDED RELEASE ORAL ONCE
Status: COMPLETED | OUTPATIENT
Start: 2023-01-01 | End: 2023-01-01

## 2023-01-01 RX ORDER — GENTAMICIN SULFATE 1 MG/G
CREAM TOPICAL DAILY
Status: DISCONTINUED | OUTPATIENT
Start: 2023-01-01 | End: 2023-01-01 | Stop reason: HOSPADM

## 2023-01-01 RX ORDER — NYSTATIN 100000 [USP'U]/G
1 POWDER TOPICAL AS NEEDED
Qty: 30 G | Refills: 0 | Status: SHIPPED | OUTPATIENT
Start: 2023-01-01 | End: 2024-02-03

## 2023-01-01 RX ORDER — AMLODIPINE BESYLATE 5 MG/1
2.5 TABLET ORAL DAILY
COMMUNITY
End: 2024-01-01 | Stop reason: SDUPTHER

## 2023-01-01 RX ORDER — AZITHROMYCIN 500 MG/1
500 TABLET, FILM COATED ORAL
Status: DISCONTINUED | OUTPATIENT
Start: 2023-01-01 | End: 2023-01-01

## 2023-01-01 RX ORDER — DIPHENHYDRAMINE HYDROCHLORIDE 50 MG/ML
25 INJECTION INTRAMUSCULAR; INTRAVENOUS ONCE
Status: COMPLETED | OUTPATIENT
Start: 2023-01-01 | End: 2023-01-01

## 2023-01-01 RX ORDER — SODIUM BICARBONATE 650 MG/1
650 TABLET ORAL 3 TIMES DAILY
Qty: 90 TABLET | Refills: 0 | Status: ON HOLD | OUTPATIENT
Start: 2023-01-01 | End: 2023-01-01

## 2023-01-01 RX ORDER — ASPIRIN 81 MG/1
81 TABLET ORAL DAILY
COMMUNITY
Start: 2014-10-11

## 2023-01-01 RX ORDER — DEXTROSE 50 % IN WATER (D50W) INTRAVENOUS SYRINGE
25
Status: DISCONTINUED | OUTPATIENT
Start: 2023-01-01 | End: 2023-01-01 | Stop reason: HOSPADM

## 2023-01-01 RX ORDER — FUROSEMIDE 40 MG/1
1 TABLET ORAL DAILY
Status: ON HOLD | COMMUNITY
End: 2023-01-01 | Stop reason: ENTERED-IN-ERROR

## 2023-01-01 RX ORDER — NYSTATIN 100000 [USP'U]/G
1 POWDER TOPICAL AS NEEDED
Qty: 30 G | Refills: 0 | Status: ON HOLD | OUTPATIENT
Start: 2023-01-01 | End: 2023-01-01

## 2023-01-01 RX ORDER — ICOSAPENT ETHYL 0.5 G/1
2 CAPSULE ORAL
Status: DISCONTINUED | OUTPATIENT
Start: 2023-01-01 | End: 2023-01-01

## 2023-01-01 RX ORDER — ALLOPURINOL 100 MG/1
100 TABLET ORAL DAILY
COMMUNITY

## 2023-01-01 RX ORDER — MAGNESIUM SULFATE HEPTAHYDRATE 40 MG/ML
2 INJECTION, SOLUTION INTRAVENOUS ONCE
Status: COMPLETED | OUTPATIENT
Start: 2023-01-01 | End: 2023-01-01

## 2023-01-01 RX ORDER — FUROSEMIDE 10 MG/ML
40 INJECTION INTRAMUSCULAR; INTRAVENOUS ONCE
Status: COMPLETED | OUTPATIENT
Start: 2023-01-01 | End: 2023-01-01

## 2023-01-01 RX ORDER — BRIMONIDINE TARTRATE 2 MG/ML
1 SOLUTION/ DROPS OPHTHALMIC 2 TIMES DAILY
Qty: 5 ML | Refills: 6 | Status: SHIPPED | OUTPATIENT
Start: 2023-01-01 | End: 2024-02-03

## 2023-01-01 RX ORDER — AMOXICILLIN 500 MG/1
500 CAPSULE ORAL EVERY 8 HOURS SCHEDULED
Qty: 21 CAPSULE | Refills: 0 | Status: SHIPPED | OUTPATIENT
Start: 2023-01-01 | End: 2023-01-01 | Stop reason: HOSPADM

## 2023-01-01 RX ORDER — PREDNISONE 20 MG/1
40 TABLET ORAL DAILY
Status: DISCONTINUED | OUTPATIENT
Start: 2023-01-01 | End: 2023-01-01

## 2023-01-01 RX ORDER — LATANOPROST 50 UG/ML
1 SOLUTION/ DROPS OPHTHALMIC DAILY
Qty: 1.5 ML | Refills: 11 | Status: SHIPPED | OUTPATIENT
Start: 2023-01-01 | End: 2024-02-03

## 2023-01-01 RX ORDER — CIPROFLOXACIN 750 MG/1
750 TABLET, FILM COATED ORAL EVERY 12 HOURS SCHEDULED
Qty: 14 TABLET | Refills: 0 | Status: SHIPPED | OUTPATIENT
Start: 2023-01-01 | End: 2023-01-01 | Stop reason: HOSPADM

## 2023-01-01 RX ORDER — FLUCONAZOLE 2 MG/ML
400 INJECTION, SOLUTION INTRAVENOUS ONCE
Status: COMPLETED | OUTPATIENT
Start: 2023-01-01 | End: 2023-01-01

## 2023-01-01 RX ORDER — KETOCONAZOLE 20 MG/G
1 CREAM TOPICAL 2 TIMES DAILY PRN
COMMUNITY
End: 2023-01-01 | Stop reason: ENTERED-IN-ERROR

## 2023-01-01 RX ORDER — ALLOPURINOL 100 MG/1
100 TABLET ORAL DAILY
Status: DISCONTINUED | OUTPATIENT
Start: 2023-01-01 | End: 2023-01-01 | Stop reason: HOSPADM

## 2023-01-01 RX ORDER — LOSARTAN POTASSIUM 25 MG/1
25 TABLET ORAL NIGHTLY
COMMUNITY
End: 2023-01-01 | Stop reason: HOSPADM

## 2023-01-01 RX ORDER — HYDROCORTISONE 25 MG/G
CREAM TOPICAL 2 TIMES DAILY
Status: DISCONTINUED | OUTPATIENT
Start: 2023-01-01 | End: 2023-01-01 | Stop reason: HOSPADM

## 2023-01-01 RX ORDER — FAMOTIDINE 10 MG/ML
20 INJECTION INTRAVENOUS ONCE
Status: COMPLETED | OUTPATIENT
Start: 2023-01-01 | End: 2023-01-01

## 2023-01-01 RX ORDER — CHOLECALCIFEROL (VITAMIN D3) 25 MCG
2 TABLET ORAL DAILY
COMMUNITY
Start: 2012-09-14

## 2023-01-01 RX ORDER — BRIMONIDINE TARTRATE 2 MG/ML
1 SOLUTION/ DROPS OPHTHALMIC 2 TIMES DAILY
Status: DISCONTINUED | OUTPATIENT
Start: 2023-01-01 | End: 2023-01-01 | Stop reason: HOSPADM

## 2023-01-01 RX ORDER — CEFTRIAXONE 1 G/50ML
1 INJECTION, SOLUTION INTRAVENOUS ONCE
Status: COMPLETED | OUTPATIENT
Start: 2023-01-01 | End: 2023-01-01

## 2023-01-01 RX ORDER — INSULIN GLARGINE 100 [IU]/ML
7 INJECTION, SOLUTION SUBCUTANEOUS NIGHTLY
Status: DISCONTINUED | OUTPATIENT
Start: 2023-01-01 | End: 2023-01-01 | Stop reason: HOSPADM

## 2023-01-01 RX ORDER — LATANOPROST 50 UG/ML
1 SOLUTION/ DROPS OPHTHALMIC NIGHTLY
Status: DISCONTINUED | OUTPATIENT
Start: 2023-01-01 | End: 2023-01-01 | Stop reason: HOSPADM

## 2023-01-01 RX ORDER — LOSARTAN POTASSIUM 25 MG/1
25 TABLET ORAL NIGHTLY
Status: CANCELLED | OUTPATIENT
Start: 2023-01-01

## 2023-01-01 RX ORDER — ACETAMINOPHEN 325 MG/1
975 TABLET ORAL ONCE
Status: COMPLETED | OUTPATIENT
Start: 2023-01-01 | End: 2023-01-01

## 2023-01-01 RX ORDER — ACETAMINOPHEN 325 MG/1
975 TABLET ORAL EVERY 8 HOURS PRN
Status: DISCONTINUED | OUTPATIENT
Start: 2023-01-01 | End: 2023-01-01 | Stop reason: HOSPADM

## 2023-01-01 RX ORDER — FAMOTIDINE 10 MG/1
10 TABLET ORAL DAILY
Qty: 30 TABLET | Refills: 0 | Status: CANCELLED | OUTPATIENT
Start: 2023-01-01 | End: 2023-01-01

## 2023-01-01 RX ORDER — PETROLATUM 420 MG/G
OINTMENT TOPICAL
Status: DISCONTINUED | OUTPATIENT
Start: 2023-01-01 | End: 2023-01-01 | Stop reason: HOSPADM

## 2023-01-01 RX ORDER — ALLOPURINOL 100 MG/1
1 TABLET ORAL DAILY
COMMUNITY
Start: 2017-03-30 | End: 2023-01-01 | Stop reason: SDUPTHER

## 2023-01-01 RX ORDER — CIPROFLOXACIN 500 MG/1
750 TABLET ORAL EVERY 12 HOURS SCHEDULED
Status: DISCONTINUED | OUTPATIENT
Start: 2023-01-01 | End: 2023-01-01 | Stop reason: HOSPADM

## 2023-01-01 RX ORDER — LOPERAMIDE HYDROCHLORIDE 2 MG/1
2 CAPSULE ORAL 2 TIMES DAILY PRN
Qty: 20 CAPSULE | Refills: 1 | Status: SHIPPED | OUTPATIENT
Start: 2023-01-01 | End: 2023-01-01

## 2023-01-01 RX ORDER — INSULIN LISPRO 100 [IU]/ML
0-10 INJECTION, SOLUTION INTRAVENOUS; SUBCUTANEOUS
Status: DISCONTINUED | OUTPATIENT
Start: 2023-01-01 | End: 2023-01-01 | Stop reason: HOSPADM

## 2023-01-01 RX ORDER — SODIUM CHLORIDE, SODIUM LACTATE, POTASSIUM CHLORIDE, CALCIUM CHLORIDE 600; 310; 30; 20 MG/100ML; MG/100ML; MG/100ML; MG/100ML
100 INJECTION, SOLUTION INTRAVENOUS CONTINUOUS
Status: DISCONTINUED | OUTPATIENT
Start: 2023-01-01 | End: 2023-01-01

## 2023-01-01 RX ORDER — FAMOTIDINE 20 MG/1
10 TABLET, FILM COATED ORAL EVERY OTHER DAY
Status: DISCONTINUED | OUTPATIENT
Start: 2023-01-01 | End: 2023-01-01 | Stop reason: HOSPADM

## 2023-01-01 RX ORDER — AMPICILLIN TRIHYDRATE 250 MG
600 CAPSULE ORAL DAILY
COMMUNITY
End: 2023-01-01 | Stop reason: SDUPTHER

## 2023-01-01 RX ORDER — FAMOTIDINE 20 MG/1
40 TABLET, FILM COATED ORAL DAILY
Status: DISCONTINUED | OUTPATIENT
Start: 2023-01-01 | End: 2023-01-01 | Stop reason: DRUGHIGH

## 2023-01-01 RX ORDER — NYSTATIN 100000 [USP'U]/G
1 POWDER TOPICAL 2 TIMES DAILY
Status: DISCONTINUED | OUTPATIENT
Start: 2023-01-01 | End: 2023-01-01 | Stop reason: HOSPADM

## 2023-01-01 RX ORDER — FAMOTIDINE 10 MG/ML
10 INJECTION INTRAVENOUS ONCE
Status: COMPLETED | OUTPATIENT
Start: 2023-01-01 | End: 2023-01-01

## 2023-01-01 RX ORDER — METOPROLOL SUCCINATE 25 MG/1
25 TABLET, EXTENDED RELEASE ORAL DAILY
Status: DISCONTINUED | OUTPATIENT
Start: 2023-01-01 | End: 2023-01-01 | Stop reason: HOSPADM

## 2023-01-01 RX ORDER — LOPERAMIDE HYDROCHLORIDE 2 MG/1
2 CAPSULE ORAL 4 TIMES DAILY PRN
Status: DISCONTINUED | OUTPATIENT
Start: 2023-01-01 | End: 2023-01-01

## 2023-01-01 RX ORDER — MULTIVIT-MIN/IRON FUM/FOLIC AC 7.5 MG-4
1 TABLET ORAL DAILY
COMMUNITY

## 2023-01-01 RX ORDER — LISINOPRIL 40 MG/1
40 TABLET ORAL DAILY
Status: DISCONTINUED | OUTPATIENT
Start: 2023-01-01 | End: 2023-01-01 | Stop reason: HOSPADM

## 2023-01-01 RX ORDER — FUROSEMIDE 20 MG/1
20 TABLET ORAL DAILY PRN
Qty: 90 TABLET | Refills: 0 | Status: SHIPPED | OUTPATIENT
Start: 2023-01-01 | End: 2023-01-01 | Stop reason: HOSPADM

## 2023-01-01 RX ORDER — INSULIN LISPRO 100 [IU]/ML
10 INJECTION, SOLUTION INTRAVENOUS; SUBCUTANEOUS ONCE
Status: COMPLETED | OUTPATIENT
Start: 2023-01-01 | End: 2023-01-01

## 2023-01-01 RX ORDER — RIVAROXABAN 15 MG/1
15 TABLET, FILM COATED ORAL DAILY
COMMUNITY
Start: 2022-05-23 | End: 2023-01-01 | Stop reason: HOSPADM

## 2023-01-01 RX ORDER — METOPROLOL TARTRATE 1 MG/ML
5 INJECTION, SOLUTION INTRAVENOUS ONCE
Status: COMPLETED | OUTPATIENT
Start: 2023-01-01 | End: 2023-01-01

## 2023-01-01 RX ORDER — ALLOPURINOL 100 MG/1
100 TABLET ORAL DAILY
Qty: 90 TABLET | Refills: 2 | Status: SHIPPED | OUTPATIENT
Start: 2023-01-01 | End: 2023-01-01 | Stop reason: HOSPADM

## 2023-01-01 RX ORDER — VANCOMYCIN HYDROCHLORIDE 1 G/20ML
INJECTION, POWDER, LYOPHILIZED, FOR SOLUTION INTRAVENOUS DAILY PRN
Status: DISCONTINUED | OUTPATIENT
Start: 2023-01-01 | End: 2023-01-01

## 2023-01-01 RX ORDER — ACETAMINOPHEN 325 MG/1
650 TABLET ORAL ONCE
Status: DISCONTINUED | OUTPATIENT
Start: 2023-01-01 | End: 2023-01-01 | Stop reason: HOSPADM

## 2023-01-01 RX ORDER — ICOSAPENT ETHYL 0.5 G/1
CAPSULE ORAL DAILY
Status: DISCONTINUED | OUTPATIENT
Start: 2023-01-01 | End: 2023-01-01

## 2023-01-01 RX ORDER — INSULIN GLARGINE 100 [IU]/ML
20 INJECTION, SOLUTION SUBCUTANEOUS NIGHTLY
Status: DISCONTINUED | OUTPATIENT
Start: 2023-01-01 | End: 2023-01-01 | Stop reason: HOSPADM

## 2023-01-01 RX ORDER — FAMOTIDINE 10 MG/ML
10 INJECTION INTRAVENOUS
Status: DISCONTINUED | OUTPATIENT
Start: 2023-01-01 | End: 2023-01-01

## 2023-01-01 RX ORDER — INSULIN LISPRO 100 [IU]/ML
0-5 INJECTION, SOLUTION INTRAVENOUS; SUBCUTANEOUS
Status: DISCONTINUED | OUTPATIENT
Start: 2023-01-01 | End: 2023-01-01 | Stop reason: HOSPADM

## 2023-01-01 RX ORDER — EZETIMIBE 10 MG/1
10 TABLET ORAL DAILY
Status: DISCONTINUED | OUTPATIENT
Start: 2023-01-01 | End: 2023-01-01 | Stop reason: HOSPADM

## 2023-01-01 RX ORDER — FENTANYL CITRATE 50 UG/ML
25 INJECTION, SOLUTION INTRAMUSCULAR; INTRAVENOUS ONCE
Status: COMPLETED | OUTPATIENT
Start: 2023-01-01 | End: 2023-01-01

## 2023-01-01 RX ORDER — GENTAMICIN SULFATE 1 MG/G
CREAM TOPICAL DAILY
Qty: 30 G | Refills: 1 | Status: SHIPPED | OUTPATIENT
Start: 2023-01-01 | End: 2023-01-01 | Stop reason: HOSPADM

## 2023-01-01 RX ORDER — LANCETS
EACH MISCELLANEOUS
Status: ON HOLD | COMMUNITY
End: 2023-01-01 | Stop reason: ENTERED-IN-ERROR

## 2023-01-01 RX ORDER — INSULIN GLARGINE-YFGN 100 [IU]/ML
13 INJECTION, SOLUTION SUBCUTANEOUS NIGHTLY
COMMUNITY

## 2023-01-01 RX ORDER — DOXYCYCLINE 100 MG/1
100 CAPSULE ORAL EVERY 12 HOURS SCHEDULED
Qty: 4 CAPSULE | Refills: 0 | Status: SHIPPED | OUTPATIENT
Start: 2023-01-01 | End: 2023-01-01

## 2023-01-01 RX ORDER — FUROSEMIDE 20 MG/1
20 TABLET ORAL 2 TIMES DAILY
COMMUNITY
Start: 2015-04-14 | End: 2023-01-01 | Stop reason: SDUPTHER

## 2023-01-01 RX ORDER — HEPARIN SODIUM 5000 [USP'U]/ML
5000 INJECTION, SOLUTION INTRAVENOUS; SUBCUTANEOUS EVERY 8 HOURS
Status: DISCONTINUED | OUTPATIENT
Start: 2023-01-01 | End: 2023-01-01 | Stop reason: HOSPADM

## 2023-01-01 RX ORDER — CEFEPIME 1 G/50ML
1 INJECTION, SOLUTION INTRAVENOUS EVERY 24 HOURS
Status: DISCONTINUED | OUTPATIENT
Start: 2023-01-01 | End: 2023-01-01

## 2023-01-01 RX ORDER — AA/PROT/LYSINE/METHIO/VIT C/B6 50-12.5 MG
1 TABLET ORAL DAILY
COMMUNITY

## 2023-01-01 RX ORDER — SODIUM BICARBONATE 650 MG/1
650 TABLET ORAL 2 TIMES DAILY
Status: DISCONTINUED | OUTPATIENT
Start: 2023-01-01 | End: 2023-01-01 | Stop reason: HOSPADM

## 2023-01-01 RX ORDER — INSULIN LISPRO 100 [IU]/ML
0-5 INJECTION, SOLUTION INTRAVENOUS; SUBCUTANEOUS
Status: DISCONTINUED | OUTPATIENT
Start: 2023-01-01 | End: 2023-01-01

## 2023-01-01 RX ORDER — LOPERAMIDE HYDROCHLORIDE 2 MG/1
2 CAPSULE ORAL DAILY
Status: DISCONTINUED | OUTPATIENT
Start: 2023-01-01 | End: 2023-01-01 | Stop reason: HOSPADM

## 2023-01-01 RX ORDER — DIPHENHYDRAMINE HCL 25 MG
25 CAPSULE ORAL ONCE
Status: COMPLETED | OUTPATIENT
Start: 2023-01-01 | End: 2023-01-01

## 2023-01-01 RX ORDER — PETROLATUM 420 MG/G
1 OINTMENT TOPICAL
Qty: 6 G | Refills: 0 | Status: SHIPPED | OUTPATIENT
Start: 2023-01-01 | End: 2024-01-01

## 2023-01-01 RX ORDER — SODIUM BICARBONATE 650 MG/1
650 TABLET ORAL 3 TIMES DAILY
Status: DISCONTINUED | OUTPATIENT
Start: 2023-01-01 | End: 2023-01-01 | Stop reason: HOSPADM

## 2023-01-01 RX ORDER — POTASSIUM CHLORIDE 1.5 G/1.58G
20 POWDER, FOR SOLUTION ORAL ONCE
Status: COMPLETED | OUTPATIENT
Start: 2023-01-01 | End: 2023-01-01

## 2023-01-01 RX ORDER — LOSARTAN POTASSIUM 25 MG/1
25 TABLET ORAL DAILY
COMMUNITY

## 2023-01-01 RX ORDER — AMOXICILLIN 500 MG/1
500 CAPSULE ORAL EVERY 8 HOURS SCHEDULED
Status: DISCONTINUED | OUTPATIENT
Start: 2023-01-01 | End: 2023-01-01 | Stop reason: HOSPADM

## 2023-01-01 RX ORDER — FAMOTIDINE 20 MG/1
20 TABLET, FILM COATED ORAL DAILY
Status: DISCONTINUED | OUTPATIENT
Start: 2023-01-01 | End: 2023-01-01 | Stop reason: HOSPADM

## 2023-01-01 RX ORDER — KETOCONAZOLE 20 MG/ML
SHAMPOO, SUSPENSION TOPICAL 3 TIMES WEEKLY
Qty: 120 ML | Refills: 0 | Status: SHIPPED | OUTPATIENT
Start: 2023-01-01 | End: 2024-02-03

## 2023-01-01 RX ORDER — SODIUM CHLORIDE, SODIUM LACTATE, POTASSIUM CHLORIDE, CALCIUM CHLORIDE 600; 310; 30; 20 MG/100ML; MG/100ML; MG/100ML; MG/100ML
100 INJECTION, SOLUTION INTRAVENOUS CONTINUOUS
Status: DISCONTINUED | OUTPATIENT
Start: 2023-01-01 | End: 2023-01-01 | Stop reason: HOSPADM

## 2023-01-01 RX ORDER — SODIUM BICARBONATE 650 MG/1
650 TABLET ORAL 3 TIMES DAILY
Qty: 90 TABLET | Refills: 0 | Status: SHIPPED | OUTPATIENT
Start: 2023-01-01 | End: 2023-01-01

## 2023-01-01 RX ORDER — FERROUS SULFATE 325(65) MG
65 TABLET ORAL DAILY
Status: DISCONTINUED | OUTPATIENT
Start: 2023-01-01 | End: 2023-01-01 | Stop reason: HOSPADM

## 2023-01-01 RX ORDER — KETOCONAZOLE 20 MG/ML
SHAMPOO, SUSPENSION TOPICAL 3 TIMES WEEKLY
COMMUNITY
Start: 2022-10-28 | End: 2023-01-01 | Stop reason: SDUPTHER

## 2023-01-01 RX ORDER — AMLODIPINE BESYLATE 2.5 MG/1
2.5 TABLET ORAL DAILY
Status: DISCONTINUED | OUTPATIENT
Start: 2023-01-01 | End: 2023-01-01 | Stop reason: HOSPADM

## 2023-01-01 RX ORDER — FAMOTIDINE 20 MG/1
40 TABLET, FILM COATED ORAL DAILY
Status: DISCONTINUED | OUTPATIENT
Start: 2023-01-01 | End: 2023-01-01

## 2023-01-01 RX ORDER — POLYETHYLENE GLYCOL 3350 17 G/17G
17 POWDER, FOR SOLUTION ORAL DAILY
Status: DISCONTINUED | OUTPATIENT
Start: 2023-01-01 | End: 2023-01-01

## 2023-01-01 RX ORDER — FLUCONAZOLE 2 MG/ML
100 INJECTION, SOLUTION INTRAVENOUS EVERY 24 HOURS
Status: DISCONTINUED | OUTPATIENT
Start: 2023-01-01 | End: 2023-01-01

## 2023-01-01 RX ORDER — LOSARTAN POTASSIUM 25 MG/1
25 TABLET ORAL DAILY
Status: DISCONTINUED | OUTPATIENT
Start: 2023-01-01 | End: 2023-01-01 | Stop reason: HOSPADM

## 2023-01-01 RX ORDER — ENOXAPARIN SODIUM 100 MG/ML
40 INJECTION SUBCUTANEOUS EVERY 24 HOURS
Status: DISCONTINUED | OUTPATIENT
Start: 2023-01-01 | End: 2023-01-01

## 2023-01-01 RX ORDER — FAMOTIDINE 20 MG/1
10 TABLET, FILM COATED ORAL DAILY
Status: DISCONTINUED | OUTPATIENT
Start: 2023-01-01 | End: 2023-01-01 | Stop reason: HOSPADM

## 2023-01-01 RX ORDER — VANCOMYCIN HYDROCHLORIDE 125 MG/1
125 CAPSULE ORAL ONCE
Status: COMPLETED | OUTPATIENT
Start: 2023-01-01 | End: 2023-01-01

## 2023-01-01 RX ORDER — UBIDECARENONE 50 MG
1 CAPSULE ORAL DAILY
COMMUNITY

## 2023-01-01 RX ORDER — LATANOPROST 50 UG/ML
1 SOLUTION/ DROPS OPHTHALMIC DAILY
COMMUNITY
End: 2023-01-01 | Stop reason: SDUPTHER

## 2023-01-01 RX ORDER — SODIUM CHLORIDE, SODIUM LACTATE, POTASSIUM CHLORIDE, CALCIUM CHLORIDE 600; 310; 30; 20 MG/100ML; MG/100ML; MG/100ML; MG/100ML
100 INJECTION, SOLUTION INTRAVENOUS CONTINUOUS
Status: ACTIVE | OUTPATIENT
Start: 2023-01-01 | End: 2023-01-01

## 2023-01-01 RX ORDER — CLINDAMYCIN PHOSPHATE 900 MG/50ML
900 INJECTION, SOLUTION INTRAVENOUS EVERY 8 HOURS
Status: DISCONTINUED | OUTPATIENT
Start: 2023-01-01 | End: 2023-01-01

## 2023-01-01 RX ORDER — INSULIN PUMP SYRINGE, 3 ML
EACH MISCELLANEOUS
Status: ON HOLD | COMMUNITY
Start: 2019-04-05 | End: 2023-01-01 | Stop reason: ENTERED-IN-ERROR

## 2023-01-01 RX ORDER — FAMOTIDINE 40 MG/1
1 TABLET, FILM COATED ORAL DAILY
COMMUNITY
End: 2023-01-01 | Stop reason: HOSPADM

## 2023-01-01 RX ORDER — VANCOMYCIN HYDROCHLORIDE 125 MG/1
125 CAPSULE ORAL 4 TIMES DAILY
Status: DISCONTINUED | OUTPATIENT
Start: 2023-01-01 | End: 2023-01-01

## 2023-01-01 RX ORDER — BRIMONIDINE TARTRATE 2 MG/ML
1 SOLUTION/ DROPS OPHTHALMIC 2 TIMES DAILY
COMMUNITY
End: 2023-01-01 | Stop reason: SDUPTHER

## 2023-01-01 RX ORDER — PREDNISONE 20 MG/1
TABLET ORAL
Qty: 18 TABLET | Refills: 0 | Status: SHIPPED | OUTPATIENT
Start: 2023-01-01 | End: 2023-01-01 | Stop reason: HOSPADM

## 2023-01-01 RX ORDER — LOPERAMIDE HYDROCHLORIDE 2 MG/1
2 CAPSULE ORAL 4 TIMES DAILY PRN
Status: DISCONTINUED | OUTPATIENT
Start: 2023-01-01 | End: 2023-01-01 | Stop reason: HOSPADM

## 2023-01-01 RX ORDER — METOPROLOL SUCCINATE 25 MG/1
25 TABLET, EXTENDED RELEASE ORAL DAILY
COMMUNITY

## 2023-01-01 RX ORDER — HYDROCORTISONE 25 MG/G
CREAM TOPICAL 2 TIMES DAILY
Qty: 60 G | Refills: 0 | Status: SHIPPED | OUTPATIENT
Start: 2023-01-01 | End: 2024-01-01

## 2023-01-01 RX ORDER — POLYETHYLENE GLYCOL 3350 17 G/17G
17 POWDER, FOR SOLUTION ORAL DAILY
Status: DISCONTINUED | OUTPATIENT
Start: 2023-01-01 | End: 2023-01-01 | Stop reason: HOSPADM

## 2023-01-01 RX ORDER — ALLOPURINOL 100 MG/1
100 TABLET ORAL DAILY
Qty: 90 TABLET | Refills: 0 | Status: SHIPPED | OUTPATIENT
Start: 2023-01-01 | End: 2023-01-01 | Stop reason: SDUPTHER

## 2023-01-01 RX ORDER — SODIUM CHLORIDE, SODIUM LACTATE, POTASSIUM CHLORIDE, CALCIUM CHLORIDE 600; 310; 30; 20 MG/100ML; MG/100ML; MG/100ML; MG/100ML
125 INJECTION, SOLUTION INTRAVENOUS ONCE
Status: COMPLETED | OUTPATIENT
Start: 2023-01-01 | End: 2023-01-01

## 2023-01-01 RX ADMIN — CLINDAMYCIN PHOSPHATE 900 MG: 900 INJECTION, SOLUTION INTRAVENOUS at 17:19

## 2023-01-01 RX ADMIN — SODIUM CHLORIDE, POTASSIUM CHLORIDE, SODIUM LACTATE AND CALCIUM CHLORIDE 100 ML/HR: 600; 310; 30; 20 INJECTION, SOLUTION INTRAVENOUS at 00:20

## 2023-01-01 RX ADMIN — INSULIN LISPRO 3 UNITS: 100 INJECTION, SOLUTION INTRAVENOUS; SUBCUTANEOUS at 09:44

## 2023-01-01 RX ADMIN — INSULIN LISPRO 2 UNITS: 100 INJECTION, SOLUTION INTRAVENOUS; SUBCUTANEOUS at 18:34

## 2023-01-01 RX ADMIN — ASPIRIN 81 MG: 81 TABLET, COATED ORAL at 10:20

## 2023-01-01 RX ADMIN — RIVAROXABAN 15 MG: 15 TABLET, FILM COATED ORAL at 20:50

## 2023-01-01 RX ADMIN — LATANOPROST 1 DROP: 50 SOLUTION OPHTHALMIC at 20:44

## 2023-01-01 RX ADMIN — HEPARIN SODIUM 5000 UNITS: 5000 INJECTION INTRAVENOUS; SUBCUTANEOUS at 13:06

## 2023-01-01 RX ADMIN — VANCOMYCIN HYDROCHLORIDE 125 MG: 125 CAPSULE ORAL at 06:06

## 2023-01-01 RX ADMIN — BRIMONIDINE TARTRATE 1 DROP: 2 SOLUTION/ DROPS OPHTHALMIC at 10:18

## 2023-01-01 RX ADMIN — ASPIRIN 81 MG: 81 TABLET, COATED ORAL at 08:34

## 2023-01-01 RX ADMIN — CLINDAMYCIN PHOSPHATE 900 MG: 900 INJECTION, SOLUTION INTRAVENOUS at 00:13

## 2023-01-01 RX ADMIN — SODIUM BICARBONATE 650 MG: 650 TABLET ORAL at 09:29

## 2023-01-01 RX ADMIN — INSULIN LISPRO 3 UNITS: 100 INJECTION, SOLUTION INTRAVENOUS; SUBCUTANEOUS at 18:36

## 2023-01-01 RX ADMIN — NYSTATIN 1 APPLICATION: 100000 POWDER TOPICAL at 10:16

## 2023-01-01 RX ADMIN — DOXYCYCLINE HYCLATE 100 MG: 100 CAPSULE ORAL at 14:15

## 2023-01-01 RX ADMIN — HEPARIN SODIUM 5000 UNITS: 5000 INJECTION INTRAVENOUS; SUBCUTANEOUS at 12:10

## 2023-01-01 RX ADMIN — PREDNISONE 40 MG: 20 TABLET ORAL at 12:10

## 2023-01-01 RX ADMIN — FERROUS SULFATE TAB 325 MG (65 MG ELEMENTAL FE) 65 MG OF IRON: 325 (65 FE) TAB at 08:52

## 2023-01-01 RX ADMIN — VANCOMYCIN HYDROCHLORIDE 125 MG: 125 CAPSULE ORAL at 18:11

## 2023-01-01 RX ADMIN — BRIMONIDINE TARTRATE 1 DROP: 2 SOLUTION/ DROPS OPHTHALMIC at 20:39

## 2023-01-01 RX ADMIN — GENTAMICIN SULFATE: 1 CREAM TOPICAL at 08:49

## 2023-01-01 RX ADMIN — NYSTATIN 1 APPLICATION: 100000 POWDER TOPICAL at 09:59

## 2023-01-01 RX ADMIN — EZETIMIBE 10 MG: 10 TABLET ORAL at 09:52

## 2023-01-01 RX ADMIN — CEFEPIME 1 G: 1 INJECTION, SOLUTION INTRAVENOUS at 09:04

## 2023-01-01 RX ADMIN — SODIUM CHLORIDE, POTASSIUM CHLORIDE, SODIUM LACTATE AND CALCIUM CHLORIDE 100 ML/HR: 600; 310; 30; 20 INJECTION, SOLUTION INTRAVENOUS at 16:34

## 2023-01-01 RX ADMIN — SODIUM CHLORIDE 1250 ML: 9 INJECTION, SOLUTION INTRAVENOUS at 18:45

## 2023-01-01 RX ADMIN — FERROUS SULFATE TAB 325 MG (65 MG ELEMENTAL FE) 65 MG OF IRON: 325 (65 FE) TAB at 09:28

## 2023-01-01 RX ADMIN — VANCOMYCIN HYDROCHLORIDE 125 MG: 125 CAPSULE ORAL at 22:09

## 2023-01-01 RX ADMIN — AMLODIPINE BESYLATE 2.5 MG: 2.5 TABLET ORAL at 09:52

## 2023-01-01 RX ADMIN — MAGNESIUM SULFATE HEPTAHYDRATE 2 G: 40 INJECTION, SOLUTION INTRAVENOUS at 10:19

## 2023-01-01 RX ADMIN — VANCOMYCIN HYDROCHLORIDE 1500 MG: 1.5 INJECTION, POWDER, LYOPHILIZED, FOR SOLUTION INTRAVENOUS at 17:53

## 2023-01-01 RX ADMIN — ACETAMINOPHEN 975 MG: 325 TABLET ORAL at 00:05

## 2023-01-01 RX ADMIN — FERROUS SULFATE TAB 325 MG (65 MG ELEMENTAL FE) 65 MG OF IRON: 325 (65 FE) TAB at 09:38

## 2023-01-01 RX ADMIN — BRIMONIDINE TARTRATE 1 DROP: 2 SOLUTION/ DROPS OPHTHALMIC at 04:46

## 2023-01-01 RX ADMIN — CIPROFLOXACIN 750 MG: 500 TABLET, FILM COATED ORAL at 12:39

## 2023-01-01 RX ADMIN — FERROUS SULFATE TAB 325 MG (65 MG ELEMENTAL FE) 65 MG OF IRON: 325 (65 FE) TAB at 09:03

## 2023-01-01 RX ADMIN — ACETAMINOPHEN 975 MG: 325 TABLET ORAL at 02:50

## 2023-01-01 RX ADMIN — RIVAROXABAN 15 MG: 15 TABLET, FILM COATED ORAL at 08:24

## 2023-01-01 RX ADMIN — FLUCONAZOLE 400 MG: 2 INJECTION, SOLUTION INTRAVENOUS at 02:00

## 2023-01-01 RX ADMIN — BRIMONIDINE TARTRATE 1 DROP: 2 SOLUTION/ DROPS OPHTHALMIC at 09:39

## 2023-01-01 RX ADMIN — ASPIRIN 81 MG: 81 TABLET, COATED ORAL at 09:04

## 2023-01-01 RX ADMIN — POLYETHYLENE GLYCOL 3350 17 G: 17 POWDER, FOR SOLUTION ORAL at 08:51

## 2023-01-01 RX ADMIN — SODIUM BICARBONATE 650 MG: 650 TABLET ORAL at 22:23

## 2023-01-01 RX ADMIN — SODIUM CHLORIDE, POTASSIUM CHLORIDE, SODIUM LACTATE AND CALCIUM CHLORIDE 100 ML/HR: 600; 310; 30; 20 INJECTION, SOLUTION INTRAVENOUS at 04:27

## 2023-01-01 RX ADMIN — INSULIN LISPRO 2 UNITS: 100 INJECTION, SOLUTION INTRAVENOUS; SUBCUTANEOUS at 18:30

## 2023-01-01 RX ADMIN — EZETIMIBE 10 MG: 10 TABLET ORAL at 08:21

## 2023-01-01 RX ADMIN — NYSTATIN 1 APPLICATION: 100000 POWDER TOPICAL at 21:10

## 2023-01-01 RX ADMIN — HEPARIN SODIUM 5000 UNITS: 5000 INJECTION INTRAVENOUS; SUBCUTANEOUS at 06:49

## 2023-01-01 RX ADMIN — LOSARTAN POTASSIUM 25 MG: 25 TABLET, FILM COATED ORAL at 08:35

## 2023-01-01 RX ADMIN — GENTAMICIN SULFATE: 1 CREAM TOPICAL at 09:39

## 2023-01-01 RX ADMIN — INSULIN LISPRO 1 UNITS: 100 INJECTION, SOLUTION INTRAVENOUS; SUBCUTANEOUS at 17:45

## 2023-01-01 RX ADMIN — SODIUM CHLORIDE 1000 ML: 9 INJECTION, SOLUTION INTRAVENOUS at 09:59

## 2023-01-01 RX ADMIN — EZETIMIBE 10 MG: 10 TABLET ORAL at 09:29

## 2023-01-01 RX ADMIN — INSULIN LISPRO 6 UNITS: 100 INJECTION, SOLUTION INTRAVENOUS; SUBCUTANEOUS at 13:15

## 2023-01-01 RX ADMIN — NYSTATIN 1 APPLICATION: 100000 POWDER TOPICAL at 21:26

## 2023-01-01 RX ADMIN — METOPROLOL SUCCINATE 25 MG: 25 TABLET, FILM COATED, EXTENDED RELEASE ORAL at 09:38

## 2023-01-01 RX ADMIN — SODIUM CHLORIDE, POTASSIUM CHLORIDE, SODIUM LACTATE AND CALCIUM CHLORIDE 125 ML/HR: 600; 310; 30; 20 INJECTION, SOLUTION INTRAVENOUS at 10:40

## 2023-01-01 RX ADMIN — ASPIRIN 81 MG: 81 TABLET, COATED ORAL at 08:20

## 2023-01-01 RX ADMIN — HYDROCORTISONE: 25 CREAM TOPICAL at 21:00

## 2023-01-01 RX ADMIN — SODIUM CHLORIDE, POTASSIUM CHLORIDE, SODIUM LACTATE AND CALCIUM CHLORIDE 1000 ML: 600; 310; 30; 20 INJECTION, SOLUTION INTRAVENOUS at 23:15

## 2023-01-01 RX ADMIN — METOPROLOL SUCCINATE 25 MG: 25 TABLET, FILM COATED, EXTENDED RELEASE ORAL at 09:37

## 2023-01-01 RX ADMIN — ASPIRIN 81 MG: 81 TABLET, COATED ORAL at 09:38

## 2023-01-01 RX ADMIN — SODIUM BICARBONATE 650 MG: 650 TABLET ORAL at 20:59

## 2023-01-01 RX ADMIN — VANCOMYCIN HYDROCHLORIDE 125 MG: 125 CAPSULE ORAL at 06:26

## 2023-01-01 RX ADMIN — HEPARIN SODIUM 5000 UNITS: 5000 INJECTION INTRAVENOUS; SUBCUTANEOUS at 12:25

## 2023-01-01 RX ADMIN — POTASSIUM CHLORIDE 20 MEQ: 1.5 POWDER, FOR SOLUTION ORAL at 14:23

## 2023-01-01 RX ADMIN — RIVAROXABAN 15 MG: 15 TABLET, FILM COATED ORAL at 17:37

## 2023-01-01 RX ADMIN — INSULIN LISPRO 10 UNITS: 100 INJECTION, SOLUTION INTRAVENOUS; SUBCUTANEOUS at 15:35

## 2023-01-01 RX ADMIN — HEPARIN SODIUM 5000 UNITS: 5000 INJECTION INTRAVENOUS; SUBCUTANEOUS at 20:19

## 2023-01-01 RX ADMIN — HYDROCORTISONE: 25 CREAM TOPICAL at 20:40

## 2023-01-01 RX ADMIN — DIPHENHYDRAMINE HYDROCHLORIDE 25 MG: 50 INJECTION, SOLUTION INTRAMUSCULAR; INTRAVENOUS at 12:12

## 2023-01-01 RX ADMIN — FAMOTIDINE 10 MG: 20 TABLET ORAL at 09:38

## 2023-01-01 RX ADMIN — HYDROCORTISONE: 25 CREAM TOPICAL at 09:38

## 2023-01-01 RX ADMIN — METOPROLOL SUCCINATE 25 MG: 25 TABLET, FILM COATED, EXTENDED RELEASE ORAL at 08:34

## 2023-01-01 RX ADMIN — SODIUM CHLORIDE 1000 ML: 9 INJECTION, SOLUTION INTRAVENOUS at 02:34

## 2023-01-01 RX ADMIN — FAMOTIDINE 10 MG: 20 TABLET ORAL at 20:50

## 2023-01-01 RX ADMIN — BRIMONIDINE TARTRATE 1 DROP: 2 SOLUTION/ DROPS OPHTHALMIC at 20:52

## 2023-01-01 RX ADMIN — GENTAMICIN SULFATE: 1 CREAM TOPICAL at 09:52

## 2023-01-01 RX ADMIN — EZETIMIBE 10 MG: 10 TABLET ORAL at 09:51

## 2023-01-01 RX ADMIN — ICOSAPENT ETHYL 1 G: 1 CAPSULE ORAL at 09:38

## 2023-01-01 RX ADMIN — ACETAMINOPHEN 650 MG: 325 TABLET ORAL at 19:33

## 2023-01-01 RX ADMIN — KIT FOR THE PREPARATION OF TECHNETIUM TC 99M ALBUMIN AGGREGATED 4.3 MILLICURIE: 2.5 INJECTION, POWDER, FOR SOLUTION INTRAVENOUS at 15:40

## 2023-01-01 RX ADMIN — PIPERACILLIN SODIUM AND TAZOBACTAM SODIUM 4.5 G: 4; .5 INJECTION, SOLUTION INTRAVENOUS at 16:06

## 2023-01-01 RX ADMIN — LOSARTAN POTASSIUM 25 MG: 25 TABLET, FILM COATED ORAL at 09:38

## 2023-01-01 RX ADMIN — EZETIMIBE 10 MG: 10 TABLET ORAL at 09:04

## 2023-01-01 RX ADMIN — HEPARIN SODIUM 5000 UNITS: 5000 INJECTION INTRAVENOUS; SUBCUTANEOUS at 13:14

## 2023-01-01 RX ADMIN — METHYLPREDNISOLONE SODIUM SUCCINATE 125 MG: 125 INJECTION, POWDER, FOR SOLUTION INTRAMUSCULAR; INTRAVENOUS at 02:34

## 2023-01-01 RX ADMIN — HYDROCORTISONE: 25 CREAM TOPICAL at 21:02

## 2023-01-01 RX ADMIN — INSULIN LISPRO 4 UNITS: 100 INJECTION, SOLUTION INTRAVENOUS; SUBCUTANEOUS at 21:37

## 2023-01-01 RX ADMIN — HEPARIN SODIUM 5000 UNITS: 5000 INJECTION INTRAVENOUS; SUBCUTANEOUS at 05:04

## 2023-01-01 RX ADMIN — SODIUM CHLORIDE, POTASSIUM CHLORIDE, SODIUM LACTATE AND CALCIUM CHLORIDE 100 ML/HR: 600; 310; 30; 20 INJECTION, SOLUTION INTRAVENOUS at 14:04

## 2023-01-01 RX ADMIN — SODIUM BICARBONATE 100 ML/HR: 84 INJECTION, SOLUTION INTRAVENOUS at 06:29

## 2023-01-01 RX ADMIN — RIVAROXABAN 15 MG: 15 TABLET, FILM COATED ORAL at 16:39

## 2023-01-01 RX ADMIN — AMLODIPINE BESYLATE 2.5 MG: 2.5 TABLET ORAL at 09:38

## 2023-01-01 RX ADMIN — METOPROLOL SUCCINATE 25 MG: 25 TABLET, FILM COATED, EXTENDED RELEASE ORAL at 09:04

## 2023-01-01 RX ADMIN — BRIMONIDINE TARTRATE 1 DROP: 2 SOLUTION/ DROPS OPHTHALMIC at 20:29

## 2023-01-01 RX ADMIN — METOPROLOL SUCCINATE 25 MG: 25 TABLET, FILM COATED, EXTENDED RELEASE ORAL at 09:53

## 2023-01-01 RX ADMIN — AMLODIPINE BESYLATE 2.5 MG: 2.5 TABLET ORAL at 09:59

## 2023-01-01 RX ADMIN — AMOXICILLIN 500 MG: 500 CAPSULE ORAL at 15:33

## 2023-01-01 RX ADMIN — SODIUM BICARBONATE 650 MG: 650 TABLET ORAL at 10:02

## 2023-01-01 RX ADMIN — VANCOMYCIN HYDROCHLORIDE 125 MG: 125 CAPSULE ORAL at 21:38

## 2023-01-01 RX ADMIN — BRIMONIDINE TARTRATE 1 DROP: 2 SOLUTION/ DROPS OPHTHALMIC at 09:50

## 2023-01-01 RX ADMIN — SODIUM BICARBONATE 650 MG: 650 TABLET ORAL at 09:37

## 2023-01-01 RX ADMIN — ASPIRIN 81 MG: 81 TABLET, COATED ORAL at 08:50

## 2023-01-01 RX ADMIN — SODIUM BICARBONATE 650 MG: 650 TABLET ORAL at 09:59

## 2023-01-01 RX ADMIN — LATANOPROST 1 DROP: 50 SOLUTION OPHTHALMIC at 21:10

## 2023-01-01 RX ADMIN — NYSTATIN 1 APPLICATION: 100000 POWDER TOPICAL at 08:24

## 2023-01-01 RX ADMIN — BRIMONIDINE TARTRATE 1 DROP: 2 SOLUTION/ DROPS OPHTHALMIC at 08:52

## 2023-01-01 RX ADMIN — FAMOTIDINE 10 MG: 20 TABLET ORAL at 08:50

## 2023-01-01 RX ADMIN — FAMOTIDINE 10 MG: 20 TABLET ORAL at 09:04

## 2023-01-01 RX ADMIN — SODIUM BICARBONATE 650 MG: 650 TABLET ORAL at 20:40

## 2023-01-01 RX ADMIN — METOPROLOL SUCCINATE 25 MG: 25 TABLET, EXTENDED RELEASE ORAL at 08:24

## 2023-01-01 RX ADMIN — FERROUS SULFATE TAB 325 MG (65 MG ELEMENTAL FE) 65 MG OF IRON: 325 (65 FE) TAB at 09:51

## 2023-01-01 RX ADMIN — ASPIRIN 81 MG: 81 TABLET, COATED ORAL at 09:59

## 2023-01-01 RX ADMIN — INSULIN LISPRO 2 UNITS: 100 INJECTION, SOLUTION INTRAVENOUS; SUBCUTANEOUS at 12:52

## 2023-01-01 RX ADMIN — GENTAMICIN SULFATE: 1 CREAM TOPICAL at 09:32

## 2023-01-01 RX ADMIN — HEPARIN SODIUM 5000 UNITS: 5000 INJECTION INTRAVENOUS; SUBCUTANEOUS at 22:10

## 2023-01-01 RX ADMIN — PIPERACILLIN SODIUM AND TAZOBACTAM SODIUM 3.38 G: 3; .375 INJECTION, SOLUTION INTRAVENOUS at 23:39

## 2023-01-01 RX ADMIN — FUROSEMIDE 40 MG: 10 INJECTION, SOLUTION INTRAMUSCULAR; INTRAVENOUS at 14:14

## 2023-01-01 RX ADMIN — VANCOMYCIN HYDROCHLORIDE 125 MG: 125 CAPSULE ORAL at 12:11

## 2023-01-01 RX ADMIN — SODIUM BICARBONATE 650 MG: 650 TABLET ORAL at 09:53

## 2023-01-01 RX ADMIN — LATANOPROST 1 DROP: 50 SOLUTION OPHTHALMIC at 22:00

## 2023-01-01 RX ADMIN — FAMOTIDINE 40 MG: 20 TABLET ORAL at 08:20

## 2023-01-01 RX ADMIN — SODIUM BICARBONATE 650 MG: 650 TABLET ORAL at 09:38

## 2023-01-01 RX ADMIN — LATANOPROST 1 DROP: 50 SOLUTION OPHTHALMIC at 21:01

## 2023-01-01 RX ADMIN — NYSTATIN 1 APPLICATION: 100000 POWDER TOPICAL at 09:38

## 2023-01-01 RX ADMIN — INSULIN GLARGINE 20 UNITS: 100 INJECTION, SOLUTION SUBCUTANEOUS at 20:29

## 2023-01-01 RX ADMIN — SODIUM BICARBONATE 650 MG: 650 TABLET ORAL at 21:26

## 2023-01-01 RX ADMIN — DOXYCYCLINE HYCLATE 100 MG: 100 CAPSULE ORAL at 10:19

## 2023-01-01 RX ADMIN — EZETIMIBE 10 MG: 10 TABLET ORAL at 20:51

## 2023-01-01 RX ADMIN — HYDROCORTISONE: 25 CREAM TOPICAL at 10:17

## 2023-01-01 RX ADMIN — BRIMONIDINE TARTRATE 1 DROP: 2 SOLUTION/ DROPS OPHTHALMIC at 09:38

## 2023-01-01 RX ADMIN — SODIUM BICARBONATE 650 MG: 650 TABLET ORAL at 20:34

## 2023-01-01 RX ADMIN — NYSTATIN 1 APPLICATION: 100000 POWDER TOPICAL at 21:00

## 2023-01-01 RX ADMIN — METOPROLOL TARTRATE 5 MG: 5 INJECTION INTRAVENOUS at 02:45

## 2023-01-01 RX ADMIN — ASPIRIN 81 MG: 81 TABLET, COATED ORAL at 09:29

## 2023-01-01 RX ADMIN — PREDNISONE 40 MG: 20 TABLET ORAL at 09:51

## 2023-01-01 RX ADMIN — DOXYCYCLINE HYCLATE 100 MG: 100 CAPSULE ORAL at 09:37

## 2023-01-01 RX ADMIN — ASPIRIN 81 MG: 81 TABLET, COATED ORAL at 09:28

## 2023-01-01 RX ADMIN — VANCOMYCIN HYDROCHLORIDE 125 MG: 125 CAPSULE ORAL at 18:35

## 2023-01-01 RX ADMIN — EZETIMIBE 10 MG: 10 TABLET ORAL at 09:39

## 2023-01-01 RX ADMIN — CEFTAZIDIME 1 G: 1 INJECTION, POWDER, FOR SOLUTION INTRAMUSCULAR; INTRAVENOUS at 11:27

## 2023-01-01 RX ADMIN — EZETIMIBE 10 MG: 10 TABLET ORAL at 09:38

## 2023-01-01 RX ADMIN — LATANOPROST 1 DROP: 50 SOLUTION OPHTHALMIC at 21:26

## 2023-01-01 RX ADMIN — PREDNISONE 40 MG: 20 TABLET ORAL at 13:14

## 2023-01-01 RX ADMIN — FAMOTIDINE 10 MG: 10 INJECTION, SOLUTION INTRAVENOUS at 12:12

## 2023-01-01 RX ADMIN — LISINOPRIL 40 MG: 40 TABLET ORAL at 08:24

## 2023-01-01 RX ADMIN — FAMOTIDINE 10 MG: 20 TABLET ORAL at 08:34

## 2023-01-01 RX ADMIN — METOPROLOL SUCCINATE 25 MG: 25 TABLET, FILM COATED, EXTENDED RELEASE ORAL at 08:51

## 2023-01-01 RX ADMIN — FAMOTIDINE 10 MG: 20 TABLET ORAL at 09:52

## 2023-01-01 RX ADMIN — ACETAMINOPHEN 975 MG: 325 TABLET ORAL at 23:07

## 2023-01-01 RX ADMIN — BRIMONIDINE TARTRATE 1 DROP: 2 SOLUTION/ DROPS OPHTHALMIC at 21:01

## 2023-01-01 RX ADMIN — DOXYCYCLINE HYCLATE 100 MG: 100 CAPSULE ORAL at 21:00

## 2023-01-01 RX ADMIN — HEPARIN SODIUM 5000 UNITS: 5000 INJECTION INTRAVENOUS; SUBCUTANEOUS at 20:09

## 2023-01-01 RX ADMIN — RIVAROXABAN 15 MG: 15 TABLET, FILM COATED ORAL at 17:21

## 2023-01-01 RX ADMIN — AMLODIPINE BESYLATE 2.5 MG: 2.5 TABLET ORAL at 10:20

## 2023-01-01 RX ADMIN — POTASSIUM CHLORIDE 10 MEQ: 1500 TABLET, EXTENDED RELEASE ORAL at 14:15

## 2023-01-01 RX ADMIN — HEPARIN SODIUM 5000 UNITS: 5000 INJECTION INTRAVENOUS; SUBCUTANEOUS at 21:17

## 2023-01-01 RX ADMIN — LATANOPROST 1 DROP: 50 SOLUTION OPHTHALMIC at 20:40

## 2023-01-01 RX ADMIN — BRIMONIDINE TARTRATE 1 DROP: 2 SOLUTION/ DROPS OPHTHALMIC at 21:10

## 2023-01-01 RX ADMIN — METOPROLOL SUCCINATE 25 MG: 25 TABLET, FILM COATED, EXTENDED RELEASE ORAL at 10:20

## 2023-01-01 RX ADMIN — SODIUM BICARBONATE 650 MG: 650 TABLET ORAL at 21:10

## 2023-01-01 RX ADMIN — NYSTATIN 1 APPLICATION: 100000 POWDER TOPICAL at 21:01

## 2023-01-01 RX ADMIN — SODIUM CHLORIDE, POTASSIUM CHLORIDE, SODIUM LACTATE AND CALCIUM CHLORIDE 100 ML/HR: 600; 310; 30; 20 INJECTION, SOLUTION INTRAVENOUS at 02:27

## 2023-01-01 RX ADMIN — RIVAROXABAN 15 MG: 15 TABLET, FILM COATED ORAL at 16:19

## 2023-01-01 RX ADMIN — ASPIRIN 81 MG: 81 TABLET, COATED ORAL at 09:52

## 2023-01-01 RX ADMIN — INSULIN LISPRO 2 UNITS: 100 INJECTION, SOLUTION INTRAVENOUS; SUBCUTANEOUS at 12:25

## 2023-01-01 RX ADMIN — INSULIN LISPRO 2 UNITS: 100 INJECTION, SOLUTION INTRAVENOUS; SUBCUTANEOUS at 13:07

## 2023-01-01 RX ADMIN — METOPROLOL SUCCINATE 25 MG: 25 TABLET, FILM COATED, EXTENDED RELEASE ORAL at 09:29

## 2023-01-01 RX ADMIN — HEPARIN SODIUM 5000 UNITS: 5000 INJECTION INTRAVENOUS; SUBCUTANEOUS at 21:38

## 2023-01-01 RX ADMIN — SODIUM BICARBONATE 100 ML/HR: 84 INJECTION, SOLUTION INTRAVENOUS at 15:45

## 2023-01-01 RX ADMIN — INSULIN GLARGINE 7 UNITS: 100 INJECTION, SOLUTION SUBCUTANEOUS at 23:35

## 2023-01-01 RX ADMIN — NYSTATIN 1 APPLICATION: 100000 POWDER TOPICAL at 12:35

## 2023-01-01 RX ADMIN — PIPERACILLIN SODIUM AND TAZOBACTAM SODIUM 3.38 G: 3; .375 INJECTION, SOLUTION INTRAVENOUS at 08:18

## 2023-01-01 RX ADMIN — INSULIN GLARGINE 20 UNITS: 100 INJECTION, SOLUTION SUBCUTANEOUS at 20:56

## 2023-01-01 RX ADMIN — DOXYCYCLINE HYCLATE 100 MG: 100 CAPSULE ORAL at 08:35

## 2023-01-01 RX ADMIN — BRIMONIDINE TARTRATE 1 DROP: 2 SOLUTION/ DROPS OPHTHALMIC at 21:27

## 2023-01-01 RX ADMIN — DOXYCYCLINE HYCLATE 100 MG: 100 CAPSULE ORAL at 20:59

## 2023-01-01 RX ADMIN — GENTAMICIN SULFATE: 1 CREAM TOPICAL at 15:45

## 2023-01-01 RX ADMIN — NYSTATIN 1 APPLICATION: 100000 POWDER TOPICAL at 08:34

## 2023-01-01 RX ADMIN — ASPIRIN 81 MG: 81 TABLET, COATED ORAL at 09:51

## 2023-01-01 RX ADMIN — RIVAROXABAN 15 MG: 15 TABLET, FILM COATED ORAL at 16:46

## 2023-01-01 RX ADMIN — HEPARIN SODIUM 5000 UNITS: 5000 INJECTION INTRAVENOUS; SUBCUTANEOUS at 20:29

## 2023-01-01 RX ADMIN — RIVAROXABAN 15 MG: 15 TABLET, FILM COATED ORAL at 17:45

## 2023-01-01 RX ADMIN — EZETIMIBE 10 MG: 10 TABLET ORAL at 08:51

## 2023-01-01 RX ADMIN — LOSARTAN POTASSIUM 25 MG: 25 TABLET, FILM COATED ORAL at 09:29

## 2023-01-01 RX ADMIN — AMLODIPINE BESYLATE 2.5 MG: 2.5 TABLET ORAL at 09:51

## 2023-01-01 RX ADMIN — HEPARIN SODIUM 5000 UNITS: 5000 INJECTION INTRAVENOUS; SUBCUTANEOUS at 05:40

## 2023-01-01 RX ADMIN — SODIUM CHLORIDE, POTASSIUM CHLORIDE, SODIUM LACTATE AND CALCIUM CHLORIDE 100 ML/HR: 600; 310; 30; 20 INJECTION, SOLUTION INTRAVENOUS at 12:46

## 2023-01-01 RX ADMIN — AMLODIPINE BESYLATE 2.5 MG: 2.5 TABLET ORAL at 09:03

## 2023-01-01 RX ADMIN — HEPARIN SODIUM 5000 UNITS: 5000 INJECTION INTRAVENOUS; SUBCUTANEOUS at 12:11

## 2023-01-01 RX ADMIN — HEPARIN SODIUM 5000 UNITS: 5000 INJECTION INTRAVENOUS; SUBCUTANEOUS at 04:27

## 2023-01-01 RX ADMIN — LOSARTAN POTASSIUM 25 MG: 25 TABLET, FILM COATED ORAL at 09:53

## 2023-01-01 RX ADMIN — EZETIMIBE 10 MG: 10 TABLET ORAL at 09:59

## 2023-01-01 RX ADMIN — SODIUM BICARBONATE 650 MG: 650 TABLET ORAL at 10:21

## 2023-01-01 RX ADMIN — FAMOTIDINE 10 MG: 20 TABLET ORAL at 09:59

## 2023-01-01 RX ADMIN — VANCOMYCIN HYDROCHLORIDE 125 MG: 125 CAPSULE ORAL at 20:48

## 2023-01-01 RX ADMIN — FAMOTIDINE 20 MG: 10 INJECTION, SOLUTION INTRAVENOUS at 20:46

## 2023-01-01 RX ADMIN — NYSTATIN 1 APPLICATION: 100000 POWDER TOPICAL at 09:39

## 2023-01-01 RX ADMIN — BRIMONIDINE TARTRATE 1 DROP: 2 SOLUTION/ DROPS OPHTHALMIC at 09:05

## 2023-01-01 RX ADMIN — LATANOPROST 1 DROP: 50 SOLUTION OPHTHALMIC at 08:53

## 2023-01-01 RX ADMIN — LATANOPROST 1 DROP: 50 SOLUTION OPHTHALMIC at 21:53

## 2023-01-01 RX ADMIN — METHYLPREDNISOLONE SODIUM SUCCINATE 125 MG: 125 INJECTION, POWDER, FOR SOLUTION INTRAMUSCULAR; INTRAVENOUS at 12:11

## 2023-01-01 RX ADMIN — BRIMONIDINE TARTRATE 1 DROP: 2 SOLUTION/ DROPS OPHTHALMIC at 08:34

## 2023-01-01 RX ADMIN — FAMOTIDINE 10 MG: 20 TABLET ORAL at 09:28

## 2023-01-01 RX ADMIN — METOPROLOL SUCCINATE 25 MG: 25 TABLET, FILM COATED, EXTENDED RELEASE ORAL at 09:51

## 2023-01-01 RX ADMIN — FENTANYL CITRATE 25 MCG: 50 INJECTION, SOLUTION INTRAMUSCULAR; INTRAVENOUS at 20:47

## 2023-01-01 RX ADMIN — ACETAMINOPHEN 975 MG: 325 TABLET ORAL at 06:50

## 2023-01-01 RX ADMIN — METOPROLOL TARTRATE 5 MG: 5 INJECTION INTRAVENOUS at 01:45

## 2023-01-01 RX ADMIN — AZITHROMYCIN MONOHYDRATE 500 MG: 500 INJECTION, POWDER, LYOPHILIZED, FOR SOLUTION INTRAVENOUS at 14:15

## 2023-01-01 RX ADMIN — SODIUM BICARBONATE 650 MG: 650 TABLET ORAL at 14:59

## 2023-01-01 RX ADMIN — FAMOTIDINE 10 MG: 20 TABLET ORAL at 10:20

## 2023-01-01 RX ADMIN — BRIMONIDINE TARTRATE 1 DROP: 2 SOLUTION/ DROPS OPHTHALMIC at 21:38

## 2023-01-01 RX ADMIN — EZETIMIBE 10 MG: 10 TABLET ORAL at 09:28

## 2023-01-01 RX ADMIN — LOSARTAN POTASSIUM 25 MG: 25 TABLET, FILM COATED ORAL at 10:19

## 2023-01-01 RX ADMIN — CEFTRIAXONE SODIUM 1 G: 1 INJECTION, SOLUTION INTRAVENOUS at 17:28

## 2023-01-01 RX ADMIN — AZITHROMYCIN DIHYDRATE 500 MG: 500 TABLET ORAL at 14:05

## 2023-01-01 RX ADMIN — LOPERAMIDE HYDROCHLORIDE 2 MG: 2 CAPSULE ORAL at 11:04

## 2023-01-01 RX ADMIN — BRIMONIDINE TARTRATE 1 DROP: 2 SOLUTION/ DROPS OPHTHALMIC at 20:56

## 2023-01-01 RX ADMIN — METOPROLOL SUCCINATE 25 MG: 25 TABLET, FILM COATED, EXTENDED RELEASE ORAL at 09:28

## 2023-01-01 RX ADMIN — EZETIMIBE 10 MG: 10 TABLET ORAL at 10:21

## 2023-01-01 RX ADMIN — EZETIMIBE 10 MG: 10 TABLET ORAL at 08:35

## 2023-01-01 RX ADMIN — BRIMONIDINE TARTRATE 1 DROP: 2 SOLUTION/ DROPS OPHTHALMIC at 09:52

## 2023-01-01 RX ADMIN — PERFLUTREN 2 ML OF DILUTION: 6.52 INJECTION, SUSPENSION INTRAVENOUS at 13:53

## 2023-01-01 RX ADMIN — INSULIN LISPRO 6 UNITS: 100 INJECTION, SOLUTION INTRAVENOUS; SUBCUTANEOUS at 18:14

## 2023-01-01 RX ADMIN — INSULIN LISPRO 2 UNITS: 100 INJECTION, SOLUTION INTRAVENOUS; SUBCUTANEOUS at 09:08

## 2023-01-01 RX ADMIN — DIPHENHYDRAMINE HYDROCHLORIDE 25 MG: 25 CAPSULE ORAL at 12:10

## 2023-01-01 RX ADMIN — BRIMONIDINE TARTRATE 1 DROP: 2 SOLUTION/ DROPS OPHTHALMIC at 20:34

## 2023-01-01 RX ADMIN — LOSARTAN POTASSIUM 25 MG: 25 TABLET, FILM COATED ORAL at 09:59

## 2023-01-01 RX ADMIN — DIPHENHYDRAMINE HYDROCHLORIDE 25 MG: 50 INJECTION, SOLUTION INTRAMUSCULAR; INTRAVENOUS at 18:47

## 2023-01-01 RX ADMIN — LATANOPROST 1 DROP: 50 SOLUTION OPHTHALMIC at 09:05

## 2023-01-01 RX ADMIN — SODIUM BICARBONATE 650 MG: 650 TABLET ORAL at 10:19

## 2023-01-01 RX ADMIN — BRIMONIDINE TARTRATE 1 DROP: 2 SOLUTION/ DROPS OPHTHALMIC at 21:19

## 2023-01-01 RX ADMIN — METOPROLOL SUCCINATE 25 MG: 25 TABLET, FILM COATED, EXTENDED RELEASE ORAL at 10:19

## 2023-01-01 RX ADMIN — FUROSEMIDE 40 MG: 40 TABLET ORAL at 08:20

## 2023-01-01 RX ADMIN — AMLODIPINE BESYLATE 2.5 MG: 2.5 TABLET ORAL at 08:52

## 2023-01-01 RX ADMIN — ASPIRIN 81 MG: 81 TABLET, COATED ORAL at 10:19

## 2023-01-01 RX ADMIN — BRIMONIDINE TARTRATE 1 DROP: 2 SOLUTION/ DROPS OPHTHALMIC at 09:59

## 2023-01-01 RX ADMIN — AMLODIPINE BESYLATE 2.5 MG: 2.5 TABLET ORAL at 09:28

## 2023-01-01 RX ADMIN — AMLODIPINE BESYLATE 2.5 MG: 2.5 TABLET ORAL at 09:29

## 2023-01-01 RX ADMIN — BRIMONIDINE TARTRATE 1 DROP: 2 SOLUTION/ DROPS OPHTHALMIC at 09:29

## 2023-01-01 RX ADMIN — VANCOMYCIN HYDROCHLORIDE 125 MG: 125 CAPSULE ORAL at 06:50

## 2023-01-01 RX ADMIN — EZETIMIBE 10 MG: 10 TABLET ORAL at 10:19

## 2023-01-01 RX ADMIN — NYSTATIN 1 APPLICATION: 100000 POWDER TOPICAL at 09:53

## 2023-01-01 RX ADMIN — LATANOPROST 1 DROP: 50 SOLUTION OPHTHALMIC at 09:39

## 2023-01-01 RX ADMIN — SODIUM CHLORIDE, POTASSIUM CHLORIDE, SODIUM LACTATE AND CALCIUM CHLORIDE 1000 ML: 600; 310; 30; 20 INJECTION, SOLUTION INTRAVENOUS at 20:46

## 2023-01-01 RX ADMIN — BRIMONIDINE TARTRATE 1 DROP: 2 SOLUTION/ DROPS OPHTHALMIC at 21:00

## 2023-01-01 RX ADMIN — SODIUM BICARBONATE 650 MG: 650 TABLET ORAL at 20:51

## 2023-01-01 RX ADMIN — RIVAROXABAN 15 MG: 15 TABLET, FILM COATED ORAL at 18:49

## 2023-01-01 RX ADMIN — NYSTATIN 1 APPLICATION: 100000 POWDER TOPICAL at 23:43

## 2023-01-01 RX ADMIN — INSULIN GLARGINE 13 UNITS: 100 INJECTION, SOLUTION SUBCUTANEOUS at 21:37

## 2023-01-01 RX ADMIN — HYDROCORTISONE: 25 CREAM TOPICAL at 11:23

## 2023-01-01 RX ADMIN — SODIUM BICARBONATE 650 MG: 650 TABLET ORAL at 21:00

## 2023-01-01 RX ADMIN — FAMOTIDINE 10 MG: 20 TABLET ORAL at 09:29

## 2023-01-01 RX ADMIN — EZETIMIBE 10 MG: 10 TABLET ORAL at 09:37

## 2023-01-01 RX ADMIN — ICOSAPENT ETHYL 1 G: 1 CAPSULE ORAL at 09:04

## 2023-01-01 RX ADMIN — BRIMONIDINE TARTRATE 1 DROP: 2 SOLUTION/ DROPS OPHTHALMIC at 09:30

## 2023-01-01 RX ADMIN — SODIUM CHLORIDE, POTASSIUM CHLORIDE, SODIUM LACTATE AND CALCIUM CHLORIDE 100 ML/HR: 600; 310; 30; 20 INJECTION, SOLUTION INTRAVENOUS at 09:44

## 2023-01-01 RX ADMIN — INSULIN GLARGINE 20 UNITS: 100 INJECTION, SOLUTION SUBCUTANEOUS at 21:16

## 2023-01-01 RX ADMIN — INSULIN GLARGINE 20 UNITS: 100 INJECTION, SOLUTION SUBCUTANEOUS at 20:33

## 2023-01-01 RX ADMIN — METOPROLOL SUCCINATE 25 MG: 25 TABLET, FILM COATED, EXTENDED RELEASE ORAL at 09:59

## 2023-01-01 RX ADMIN — AMLODIPINE BESYLATE 2.5 MG: 2.5 TABLET ORAL at 08:20

## 2023-01-01 RX ADMIN — DOXYCYCLINE HYCLATE 100 MG: 100 CAPSULE ORAL at 20:40

## 2023-01-01 RX ADMIN — HEPARIN SODIUM 5000 UNITS: 5000 INJECTION INTRAVENOUS; SUBCUTANEOUS at 12:35

## 2023-01-01 RX ADMIN — CLINDAMYCIN PHOSPHATE 900 MG: 900 INJECTION, SOLUTION INTRAVENOUS at 08:45

## 2023-01-01 RX ADMIN — ALLOPURINOL 100 MG: 100 TABLET ORAL at 08:21

## 2023-01-01 RX ADMIN — LOPERAMIDE HYDROCHLORIDE 2 MG: 2 CAPSULE ORAL at 08:34

## 2023-01-01 RX ADMIN — INSULIN LISPRO 6 UNITS: 100 INJECTION, SOLUTION INTRAVENOUS; SUBCUTANEOUS at 08:53

## 2023-01-01 RX ADMIN — SODIUM BICARBONATE 650 MG: 650 TABLET ORAL at 08:34

## 2023-01-01 RX ADMIN — LATANOPROST 1 DROP: 50 SOLUTION OPHTHALMIC at 20:53

## 2023-01-01 RX ADMIN — FAMOTIDINE 10 MG: 20 TABLET ORAL at 10:19

## 2023-01-01 RX ADMIN — WHITE PETROLATUM: 1.75 OINTMENT TOPICAL at 11:23

## 2023-01-01 RX ADMIN — NYSTATIN 1 APPLICATION: 100000 POWDER TOPICAL at 20:40

## 2023-01-01 RX ADMIN — FAMOTIDINE 10 MG: 20 TABLET ORAL at 09:37

## 2023-01-01 RX ADMIN — NYSTATIN 1 APPLICATION: 100000 POWDER TOPICAL at 20:39

## 2023-01-01 SDOH — SOCIAL STABILITY: SOCIAL INSECURITY: DO YOU FEEL UNSAFE GOING BACK TO THE PLACE WHERE YOU ARE LIVING?: NO

## 2023-01-01 SDOH — SOCIAL STABILITY: SOCIAL INSECURITY: DO YOU FEEL ANYONE HAS EXPLOITED OR TAKEN ADVANTAGE OF YOU FINANCIALLY OR OF YOUR PERSONAL PROPERTY?: NO

## 2023-01-01 SDOH — SOCIAL STABILITY: SOCIAL INSECURITY: WERE YOU ABLE TO COMPLETE ALL THE BEHAVIORAL HEALTH SCREENINGS?: YES

## 2023-01-01 SDOH — SOCIAL STABILITY: SOCIAL INSECURITY: ABUSE: ADULT

## 2023-01-01 SDOH — SOCIAL STABILITY: SOCIAL INSECURITY: HAS ANYONE EVER THREATENED TO HURT YOUR FAMILY OR YOUR PETS?: NO

## 2023-01-01 SDOH — SOCIAL STABILITY: SOCIAL INSECURITY: ARE YOU OR HAVE YOU BEEN THREATENED OR ABUSED PHYSICALLY, EMOTIONALLY, OR SEXUALLY BY ANYONE?: NO

## 2023-01-01 SDOH — SOCIAL STABILITY: SOCIAL INSECURITY: HAVE YOU HAD THOUGHTS OF HARMING ANYONE ELSE?: NO

## 2023-01-01 SDOH — SOCIAL STABILITY: SOCIAL INSECURITY: ARE THERE ANY APPARENT SIGNS OF INJURIES/BEHAVIORS THAT COULD BE RELATED TO ABUSE/NEGLECT?: NO

## 2023-01-01 SDOH — SOCIAL STABILITY: SOCIAL INSECURITY: DOES ANYONE TRY TO KEEP YOU FROM HAVING/CONTACTING OTHER FRIENDS OR DOING THINGS OUTSIDE YOUR HOME?: NO

## 2023-01-01 ASSESSMENT — ACTIVITIES OF DAILY LIVING (ADL)
GROOMING: NEEDS ASSISTANCE
BATHING: NEEDS ASSISTANCE
HEARING - RIGHT EAR: FUNCTIONAL
DRESSING YOURSELF: INDEPENDENT
FEEDING YOURSELF: INDEPENDENT
ADEQUATE_TO_COMPLETE_ADL: NO
FEEDING YOURSELF: INDEPENDENT
ADEQUATE_TO_COMPLETE_ADL: NO
GROOMING: INDEPENDENT
WALKS IN HOME: INDEPENDENT
TOILETING: NEEDS ASSISTANCE
PATIENT'S MEMORY ADEQUATE TO SAFELY COMPLETE DAILY ACTIVITIES?: YES
BATHING: NEEDS ASSISTANCE
ADL_ASSISTANCE: NEEDS ASSISTANCE
HEARING - RIGHT EAR: FUNCTIONAL
HOME_MANAGEMENT_TIME_ENTRY: 24
GROOMING: INDEPENDENT
JUDGMENT_ADEQUATE_SAFELY_COMPLETE_DAILY_ACTIVITIES: YES
JUDGMENT_ADEQUATE_SAFELY_COMPLETE_DAILY_ACTIVITIES: YES
ASSISTIVE_DEVICE: WALKER
LACK_OF_TRANSPORTATION: NO
HEARING - LEFT EAR: FUNCTIONAL
ASSISTIVE_DEVICE: COMMODE;WALKER
HEARING - LEFT EAR: FUNCTIONAL
WALKS IN HOME: INDEPENDENT
PATIENT'S MEMORY ADEQUATE TO SAFELY COMPLETE DAILY ACTIVITIES?: YES
HEARING - LEFT EAR: FUNCTIONAL
ASSISTIVE_DEVICE: WALKER;TOILET RISER
TOILETING: INDEPENDENT
WALKS IN HOME: NEEDS ASSISTANCE
ADEQUATE_TO_COMPLETE_ADL: NO
PATIENT'S MEMORY ADEQUATE TO SAFELY COMPLETE DAILY ACTIVITIES?: YES
DRESSING YOURSELF: NEEDS ASSISTANCE
HOME_MANAGEMENT_TIME_ENTRY: 30
FEEDING YOURSELF: INDEPENDENT
TOILETING: NEEDS ASSISTANCE
BATHING: INDEPENDENT
HEARING - RIGHT EAR: FUNCTIONAL
JUDGMENT_ADEQUATE_SAFELY_COMPLETE_DAILY_ACTIVITIES: YES
BATHING_LEVEL_OF_ASSISTANCE: MAXIMUM ASSISTANCE
LACK_OF_TRANSPORTATION: NO
DRESSING YOURSELF: INDEPENDENT

## 2023-01-01 ASSESSMENT — COGNITIVE AND FUNCTIONAL STATUS - GENERAL
MOVING FROM LYING ON BACK TO SITTING ON SIDE OF FLAT BED WITH BEDRAILS: A LITTLE
WALKING IN HOSPITAL ROOM: A LOT
DAILY ACTIVITIY SCORE: 18
WALKING IN HOSPITAL ROOM: A LITTLE
WALKING IN HOSPITAL ROOM: A LITTLE
MOVING FROM LYING ON BACK TO SITTING ON SIDE OF FLAT BED WITH BEDRAILS: A LOT
MOVING FROM LYING ON BACK TO SITTING ON SIDE OF FLAT BED WITH BEDRAILS: A LITTLE
PERSONAL GROOMING: A LITTLE
DRESSING REGULAR LOWER BODY CLOTHING: A LOT
CLIMB 3 TO 5 STEPS WITH RAILING: A LOT
MOVING TO AND FROM BED TO CHAIR: A LITTLE
STANDING UP FROM CHAIR USING ARMS: A LOT
CLIMB 3 TO 5 STEPS WITH RAILING: TOTAL
CLIMB 3 TO 5 STEPS WITH RAILING: TOTAL
CLIMB 3 TO 5 STEPS WITH RAILING: A LITTLE
DRESSING REGULAR UPPER BODY CLOTHING: A LITTLE
MOVING TO AND FROM BED TO CHAIR: A LOT
STANDING UP FROM CHAIR USING ARMS: A LOT
MOBILITY SCORE: 12
DRESSING REGULAR LOWER BODY CLOTHING: A LOT
DRESSING REGULAR UPPER BODY CLOTHING: A LITTLE
MOVING FROM LYING ON BACK TO SITTING ON SIDE OF FLAT BED WITH BEDRAILS: A LOT
MOVING TO AND FROM BED TO CHAIR: A LOT
TURNING FROM BACK TO SIDE WHILE IN FLAT BAD: A LITTLE
MOVING TO AND FROM BED TO CHAIR: A LITTLE
DRESSING REGULAR UPPER BODY CLOTHING: A LITTLE
MOVING FROM LYING ON BACK TO SITTING ON SIDE OF FLAT BED WITH BEDRAILS: A LITTLE
MOBILITY SCORE: 15
DRESSING REGULAR LOWER BODY CLOTHING: A LOT
TOILETING: A LITTLE
TURNING FROM BACK TO SIDE WHILE IN FLAT BAD: A LITTLE
MOVING FROM LYING ON BACK TO SITTING ON SIDE OF FLAT BED WITH BEDRAILS: A LITTLE
DRESSING REGULAR LOWER BODY CLOTHING: A LOT
DAILY ACTIVITIY SCORE: 14
DAILY ACTIVITIY SCORE: 16
MOVING TO AND FROM BED TO CHAIR: A LITTLE
DAILY ACTIVITIY SCORE: 22
TOILETING: A LOT
HELP NEEDED FOR BATHING: A LITTLE
CLIMB 3 TO 5 STEPS WITH RAILING: A LOT
DAILY ACTIVITIY SCORE: 15
TOILETING: A LITTLE
DRESSING REGULAR UPPER BODY CLOTHING: A LITTLE
CLIMB 3 TO 5 STEPS WITH RAILING: A LOT
MOVING FROM LYING ON BACK TO SITTING ON SIDE OF FLAT BED WITH BEDRAILS: A LITTLE
MOVING TO AND FROM BED TO CHAIR: A LITTLE
PERSONAL GROOMING: A LOT
CLIMB 3 TO 5 STEPS WITH RAILING: A LOT
HELP NEEDED FOR BATHING: A LITTLE
TOILETING: A LITTLE
HELP NEEDED FOR BATHING: A LITTLE
CLIMB 3 TO 5 STEPS WITH RAILING: A LOT
STANDING UP FROM CHAIR USING ARMS: A LOT
STANDING UP FROM CHAIR USING ARMS: A LOT
DRESSING REGULAR UPPER BODY CLOTHING: A LOT
MOVING TO AND FROM BED TO CHAIR: A LITTLE
CLIMB 3 TO 5 STEPS WITH RAILING: A LOT
WALKING IN HOSPITAL ROOM: A LOT
MOVING FROM LYING ON BACK TO SITTING ON SIDE OF FLAT BED WITH BEDRAILS: A LITTLE
HELP NEEDED FOR BATHING: A LITTLE
DRESSING REGULAR UPPER BODY CLOTHING: A LITTLE
STANDING UP FROM CHAIR USING ARMS: A LITTLE
CLIMB 3 TO 5 STEPS WITH RAILING: A LOT
TOILETING: A LITTLE
DAILY ACTIVITIY SCORE: 21
TOILETING: TOTAL
WALKING IN HOSPITAL ROOM: A LOT
HELP NEEDED FOR BATHING: A LOT
TOILETING: A LOT
TURNING FROM BACK TO SIDE WHILE IN FLAT BAD: A LITTLE
WALKING IN HOSPITAL ROOM: A LITTLE
DRESSING REGULAR LOWER BODY CLOTHING: A LOT
MOBILITY SCORE: 15
TOILETING: A LITTLE
TURNING FROM BACK TO SIDE WHILE IN FLAT BAD: A LITTLE
PERSONAL GROOMING: A LITTLE
HELP NEEDED FOR BATHING: A LOT
WALKING IN HOSPITAL ROOM: A LOT
STANDING UP FROM CHAIR USING ARMS: A LITTLE
DRESSING REGULAR UPPER BODY CLOTHING: A LITTLE
MOVING TO AND FROM BED TO CHAIR: A LOT
DAILY ACTIVITIY SCORE: 19
DAILY ACTIVITIY SCORE: 18
MOVING TO AND FROM BED TO CHAIR: A LOT
PATIENT BASELINE BEDBOUND: NO
TURNING FROM BACK TO SIDE WHILE IN FLAT BAD: A LITTLE
HELP NEEDED FOR BATHING: A LOT
WALKING IN HOSPITAL ROOM: A LOT
CLIMB 3 TO 5 STEPS WITH RAILING: A LOT
MOVING TO AND FROM BED TO CHAIR: A LITTLE
PERSONAL GROOMING: A LITTLE
DRESSING REGULAR UPPER BODY CLOTHING: A LITTLE
MOVING TO AND FROM BED TO CHAIR: A LOT
DRESSING REGULAR LOWER BODY CLOTHING: A LITTLE
STANDING UP FROM CHAIR USING ARMS: A LITTLE
CLIMB 3 TO 5 STEPS WITH RAILING: A LOT
TURNING FROM BACK TO SIDE WHILE IN FLAT BAD: A LITTLE
WALKING IN HOSPITAL ROOM: A LITTLE
MOVING FROM LYING ON BACK TO SITTING ON SIDE OF FLAT BED WITH BEDRAILS: A LITTLE
DRESSING REGULAR UPPER BODY CLOTHING: A LITTLE
DRESSING REGULAR UPPER BODY CLOTHING: A LITTLE
PERSONAL GROOMING: A LITTLE
MOVING FROM LYING ON BACK TO SITTING ON SIDE OF FLAT BED WITH BEDRAILS: A LITTLE
HELP NEEDED FOR BATHING: A LITTLE
MOVING FROM LYING ON BACK TO SITTING ON SIDE OF FLAT BED WITH BEDRAILS: A LITTLE
STANDING UP FROM CHAIR USING ARMS: A LITTLE
DAILY ACTIVITIY SCORE: 18
MOVING TO AND FROM BED TO CHAIR: A LITTLE
HELP NEEDED FOR BATHING: A LITTLE
DRESSING REGULAR LOWER BODY CLOTHING: A LOT
DRESSING REGULAR LOWER BODY CLOTHING: A LOT
CLIMB 3 TO 5 STEPS WITH RAILING: A LOT
DAILY ACTIVITIY SCORE: 15
TURNING FROM BACK TO SIDE WHILE IN FLAT BAD: A LITTLE
DRESSING REGULAR LOWER BODY CLOTHING: A LOT
STANDING UP FROM CHAIR USING ARMS: A LOT
TOILETING: A LOT
DRESSING REGULAR LOWER BODY CLOTHING: A LOT
PERSONAL GROOMING: A LITTLE
EATING MEALS: A LITTLE
DRESSING REGULAR UPPER BODY CLOTHING: A LITTLE
TOILETING: A LOT
CLIMB 3 TO 5 STEPS WITH RAILING: A LOT
CLIMB 3 TO 5 STEPS WITH RAILING: A LOT
TURNING FROM BACK TO SIDE WHILE IN FLAT BAD: A LITTLE
DAILY ACTIVITIY SCORE: 19
TOILETING: A LOT
HELP NEEDED FOR BATHING: A LITTLE
DAILY ACTIVITIY SCORE: 15
CLIMB 3 TO 5 STEPS WITH RAILING: A LOT
EATING MEALS: A LITTLE
DRESSING REGULAR UPPER BODY CLOTHING: A LITTLE
DRESSING REGULAR LOWER BODY CLOTHING: TOTAL
TOILETING: A LOT
MOVING TO AND FROM BED TO CHAIR: A LITTLE
DAILY ACTIVITIY SCORE: 22
TOILETING: A LITTLE
TURNING FROM BACK TO SIDE WHILE IN FLAT BAD: A LITTLE
HELP NEEDED FOR BATHING: A LOT
DAILY ACTIVITIY SCORE: 16
DAILY ACTIVITIY SCORE: 16
WALKING IN HOSPITAL ROOM: A LITTLE
WALKING IN HOSPITAL ROOM: A LITTLE
DRESSING REGULAR UPPER BODY CLOTHING: A LOT
MOBILITY SCORE: 16
EATING MEALS: A LITTLE
MOBILITY SCORE: 15
MOBILITY SCORE: 11
PATIENT BASELINE BEDBOUND: NO
CLIMB 3 TO 5 STEPS WITH RAILING: A LOT
MOBILITY SCORE: 17
MOBILITY SCORE: 13
CLIMB 3 TO 5 STEPS WITH RAILING: A LOT
STANDING UP FROM CHAIR USING ARMS: A LOT
TOILETING: A LOT
WALKING IN HOSPITAL ROOM: A LOT
TURNING FROM BACK TO SIDE WHILE IN FLAT BAD: A LOT
PERSONAL GROOMING: A LITTLE
TURNING FROM BACK TO SIDE WHILE IN FLAT BAD: A LITTLE
MOVING TO AND FROM BED TO CHAIR: A LOT
TOILETING: A LOT
STANDING UP FROM CHAIR USING ARMS: A LITTLE
HELP NEEDED FOR BATHING: A LITTLE
TURNING FROM BACK TO SIDE WHILE IN FLAT BAD: A LITTLE
TURNING FROM BACK TO SIDE WHILE IN FLAT BAD: A LITTLE
WALKING IN HOSPITAL ROOM: A LITTLE
CLIMB 3 TO 5 STEPS WITH RAILING: A LOT
HELP NEEDED FOR BATHING: A LITTLE
MOVING TO AND FROM BED TO CHAIR: A LITTLE
MOBILITY SCORE: 17
PERSONAL GROOMING: A LITTLE
HELP NEEDED FOR BATHING: A LITTLE
MOVING TO AND FROM BED TO CHAIR: A LITTLE
CLIMB 3 TO 5 STEPS WITH RAILING: A LOT
MOVING FROM LYING ON BACK TO SITTING ON SIDE OF FLAT BED WITH BEDRAILS: A LITTLE
CLIMB 3 TO 5 STEPS WITH RAILING: TOTAL
TURNING FROM BACK TO SIDE WHILE IN FLAT BAD: A LITTLE
HELP NEEDED FOR BATHING: A LITTLE
TURNING FROM BACK TO SIDE WHILE IN FLAT BAD: A LITTLE
MOVING FROM LYING ON BACK TO SITTING ON SIDE OF FLAT BED WITH BEDRAILS: A LOT
MOBILITY SCORE: 12
PERSONAL GROOMING: A LITTLE
MOBILITY SCORE: 15
HELP NEEDED FOR BATHING: A LOT
MOVING FROM LYING ON BACK TO SITTING ON SIDE OF FLAT BED WITH BEDRAILS: A LITTLE
TOILETING: TOTAL
STANDING UP FROM CHAIR USING ARMS: A LOT
PERSONAL GROOMING: A LITTLE
TURNING FROM BACK TO SIDE WHILE IN FLAT BAD: A LITTLE
STANDING UP FROM CHAIR USING ARMS: A LITTLE
MOVING FROM LYING ON BACK TO SITTING ON SIDE OF FLAT BED WITH BEDRAILS: A LITTLE
HELP NEEDED FOR BATHING: A LITTLE
TOILETING: A LITTLE
DRESSING REGULAR LOWER BODY CLOTHING: A LOT
DAILY ACTIVITIY SCORE: 22
DAILY ACTIVITIY SCORE: 17
DAILY ACTIVITIY SCORE: 18
STANDING UP FROM CHAIR USING ARMS: A LITTLE
STANDING UP FROM CHAIR USING ARMS: A LOT
PERSONAL GROOMING: A LITTLE
MOBILITY SCORE: 15
CLIMB 3 TO 5 STEPS WITH RAILING: A LOT
PERSONAL GROOMING: A LITTLE
TOILETING: A LOT
HELP NEEDED FOR BATHING: A LOT
WALKING IN HOSPITAL ROOM: A LITTLE
WALKING IN HOSPITAL ROOM: A LOT
STANDING UP FROM CHAIR USING ARMS: A LITTLE
DRESSING REGULAR LOWER BODY CLOTHING: A LITTLE
DAILY ACTIVITIY SCORE: 24
CLIMB 3 TO 5 STEPS WITH RAILING: A LOT
MOVING TO AND FROM BED TO CHAIR: A LITTLE
STANDING UP FROM CHAIR USING ARMS: A LITTLE
WALKING IN HOSPITAL ROOM: A LOT
WALKING IN HOSPITAL ROOM: A LOT
MOBILITY SCORE: 17
TURNING FROM BACK TO SIDE WHILE IN FLAT BAD: A LOT
DRESSING REGULAR LOWER BODY CLOTHING: A LOT
HELP NEEDED FOR BATHING: A LOT
TURNING FROM BACK TO SIDE WHILE IN FLAT BAD: A LITTLE
EATING MEALS: A LITTLE
PATIENT BASELINE BEDBOUND: NO
DAILY ACTIVITIY SCORE: 15
DAILY ACTIVITIY SCORE: 22
MOBILITY SCORE: 17
MOVING TO AND FROM BED TO CHAIR: A LITTLE
MOVING FROM LYING ON BACK TO SITTING ON SIDE OF FLAT BED WITH BEDRAILS: A LOT
CLIMB 3 TO 5 STEPS WITH RAILING: A LOT
MOBILITY SCORE: 17
DRESSING REGULAR LOWER BODY CLOTHING: A LOT
MOBILITY SCORE: 17
MOBILITY SCORE: 17
WALKING IN HOSPITAL ROOM: A LITTLE
DAILY ACTIVITIY SCORE: 16
MOBILITY SCORE: 20
CLIMB 3 TO 5 STEPS WITH RAILING: A LOT
STANDING UP FROM CHAIR USING ARMS: A LITTLE
MOVING FROM LYING ON BACK TO SITTING ON SIDE OF FLAT BED WITH BEDRAILS: A LITTLE
WALKING IN HOSPITAL ROOM: A LITTLE
WALKING IN HOSPITAL ROOM: A LITTLE
MOVING FROM LYING ON BACK TO SITTING ON SIDE OF FLAT BED WITH BEDRAILS: A LITTLE
DRESSING REGULAR LOWER BODY CLOTHING: A LOT
MOVING TO AND FROM BED TO CHAIR: A LITTLE
MOBILITY SCORE: 17
MOBILITY SCORE: 16
MOBILITY SCORE: 15
STANDING UP FROM CHAIR USING ARMS: A LITTLE
WALKING IN HOSPITAL ROOM: A LOT
HELP NEEDED FOR BATHING: A LOT
MOBILITY SCORE: 15
HELP NEEDED FOR BATHING: A LOT
WALKING IN HOSPITAL ROOM: A LITTLE
HELP NEEDED FOR BATHING: A LITTLE
PERSONAL GROOMING: A LITTLE
STANDING UP FROM CHAIR USING ARMS: A LOT
DRESSING REGULAR UPPER BODY CLOTHING: A LITTLE
MOVING TO AND FROM BED TO CHAIR: A LITTLE
DAILY ACTIVITIY SCORE: 17
STANDING UP FROM CHAIR USING ARMS: A LOT
TOILETING: A LOT
TOILETING: A LOT
STANDING UP FROM CHAIR USING ARMS: A LITTLE
MOVING FROM LYING ON BACK TO SITTING ON SIDE OF FLAT BED WITH BEDRAILS: A LITTLE
MOVING FROM LYING ON BACK TO SITTING ON SIDE OF FLAT BED WITH BEDRAILS: A LITTLE
MOVING TO AND FROM BED TO CHAIR: A LITTLE
DRESSING REGULAR UPPER BODY CLOTHING: A LITTLE
TOILETING: A LITTLE
DAILY ACTIVITIY SCORE: 22
TURNING FROM BACK TO SIDE WHILE IN FLAT BAD: A LOT
WALKING IN HOSPITAL ROOM: A LITTLE
TURNING FROM BACK TO SIDE WHILE IN FLAT BAD: A LITTLE
TURNING FROM BACK TO SIDE WHILE IN FLAT BAD: A LITTLE
DRESSING REGULAR UPPER BODY CLOTHING: A LITTLE
DRESSING REGULAR UPPER BODY CLOTHING: A LITTLE
TOILETING: A LOT
TOILETING: TOTAL
TURNING FROM BACK TO SIDE WHILE IN FLAT BAD: A LOT
STANDING UP FROM CHAIR USING ARMS: A LITTLE
MOVING TO AND FROM BED TO CHAIR: A LOT
MOVING FROM LYING ON BACK TO SITTING ON SIDE OF FLAT BED WITH BEDRAILS: A LITTLE
MOVING FROM LYING ON BACK TO SITTING ON SIDE OF FLAT BED WITH BEDRAILS: A LITTLE
DRESSING REGULAR LOWER BODY CLOTHING: A LOT
CLIMB 3 TO 5 STEPS WITH RAILING: A LOT
MOBILITY SCORE: 22
MOVING TO AND FROM BED TO CHAIR: A LOT
TOILETING: TOTAL
DRESSING REGULAR LOWER BODY CLOTHING: A LITTLE
STANDING UP FROM CHAIR USING ARMS: A LOT
DRESSING REGULAR UPPER BODY CLOTHING: A LITTLE
MOVING FROM LYING ON BACK TO SITTING ON SIDE OF FLAT BED WITH BEDRAILS: A LOT
WALKING IN HOSPITAL ROOM: A LOT
MOVING FROM LYING ON BACK TO SITTING ON SIDE OF FLAT BED WITH BEDRAILS: A LITTLE
WALKING IN HOSPITAL ROOM: A LITTLE
MOBILITY SCORE: 15
MOVING TO AND FROM BED TO CHAIR: A LOT
MOVING TO AND FROM BED TO CHAIR: A LITTLE
MOVING FROM LYING ON BACK TO SITTING ON SIDE OF FLAT BED WITH BEDRAILS: A LITTLE
MOBILITY SCORE: 17
STANDING UP FROM CHAIR USING ARMS: A LITTLE
HELP NEEDED FOR BATHING: A LITTLE
PERSONAL GROOMING: A LITTLE
DRESSING REGULAR UPPER BODY CLOTHING: A LITTLE
HELP NEEDED FOR BATHING: A LOT
WALKING IN HOSPITAL ROOM: A LITTLE
DRESSING REGULAR LOWER BODY CLOTHING: TOTAL
MOBILITY SCORE: 11
DAILY ACTIVITIY SCORE: 14
DRESSING REGULAR UPPER BODY CLOTHING: A LITTLE
MOBILITY SCORE: 17
TURNING FROM BACK TO SIDE WHILE IN FLAT BAD: A LOT
DAILY ACTIVITIY SCORE: 15
TURNING FROM BACK TO SIDE WHILE IN FLAT BAD: A LITTLE
TURNING FROM BACK TO SIDE WHILE IN FLAT BAD: A LITTLE
CLIMB 3 TO 5 STEPS WITH RAILING: A LOT
STANDING UP FROM CHAIR USING ARMS: A LOT
HELP NEEDED FOR BATHING: A LITTLE
DRESSING REGULAR LOWER BODY CLOTHING: A LITTLE

## 2023-01-01 ASSESSMENT — PAIN SCALES - GENERAL
PAINLEVEL_OUTOF10: 0 - NO PAIN
PAINLEVEL_OUTOF10: 3
PAINLEVEL_OUTOF10: 0 - NO PAIN
PAINLEVEL_OUTOF10: 6
PAINLEVEL_OUTOF10: 0 - NO PAIN
PAIN_LEVEL: 1
PAINLEVEL_OUTOF10: 0 - NO PAIN
PAINLEVEL: 0-NO PAIN
PAINLEVEL_OUTOF10: 4
PAINLEVEL_OUTOF10: 0 - NO PAIN
PAINLEVEL_OUTOF10: 5 - MODERATE PAIN
PAINLEVEL_OUTOF10: 0 - NO PAIN
PAIN_LEVEL: 2
PAINLEVEL_OUTOF10: 0 - NO PAIN
PAINLEVEL_OUTOF10: 4

## 2023-01-01 ASSESSMENT — COLUMBIA-SUICIDE SEVERITY RATING SCALE - C-SSRS
1. IN THE PAST MONTH, HAVE YOU WISHED YOU WERE DEAD OR WISHED YOU COULD GO TO SLEEP AND NOT WAKE UP?: NO
6. HAVE YOU EVER DONE ANYTHING, STARTED TO DO ANYTHING, OR PREPARED TO DO ANYTHING TO END YOUR LIFE?: NO
2. HAVE YOU ACTUALLY HAD ANY THOUGHTS OF KILLING YOURSELF?: NO
6. HAVE YOU EVER DONE ANYTHING, STARTED TO DO ANYTHING, OR PREPARED TO DO ANYTHING TO END YOUR LIFE?: NO
2. HAVE YOU ACTUALLY HAD ANY THOUGHTS OF KILLING YOURSELF?: NO
2. HAVE YOU ACTUALLY HAD ANY THOUGHTS OF KILLING YOURSELF?: NO
1. IN THE PAST MONTH, HAVE YOU WISHED YOU WERE DEAD OR WISHED YOU COULD GO TO SLEEP AND NOT WAKE UP?: NO
2. HAVE YOU ACTUALLY HAD ANY THOUGHTS OF KILLING YOURSELF?: NO
6. HAVE YOU EVER DONE ANYTHING, STARTED TO DO ANYTHING, OR PREPARED TO DO ANYTHING TO END YOUR LIFE?: NO
2. HAVE YOU ACTUALLY HAD ANY THOUGHTS OF KILLING YOURSELF?: NO
1. IN THE PAST MONTH, HAVE YOU WISHED YOU WERE DEAD OR WISHED YOU COULD GO TO SLEEP AND NOT WAKE UP?: NO
1. IN THE PAST MONTH, HAVE YOU WISHED YOU WERE DEAD OR WISHED YOU COULD GO TO SLEEP AND NOT WAKE UP?: NO
2. HAVE YOU ACTUALLY HAD ANY THOUGHTS OF KILLING YOURSELF?: NO
1. IN THE PAST MONTH, HAVE YOU WISHED YOU WERE DEAD OR WISHED YOU COULD GO TO SLEEP AND NOT WAKE UP?: NO
1. IN THE PAST MONTH, HAVE YOU WISHED YOU WERE DEAD OR WISHED YOU COULD GO TO SLEEP AND NOT WAKE UP?: NO
6. HAVE YOU EVER DONE ANYTHING, STARTED TO DO ANYTHING, OR PREPARED TO DO ANYTHING TO END YOUR LIFE?: NO
2. HAVE YOU ACTUALLY HAD ANY THOUGHTS OF KILLING YOURSELF?: NO
1. IN THE PAST MONTH, HAVE YOU WISHED YOU WERE DEAD OR WISHED YOU COULD GO TO SLEEP AND NOT WAKE UP?: NO
6. HAVE YOU EVER DONE ANYTHING, STARTED TO DO ANYTHING, OR PREPARED TO DO ANYTHING TO END YOUR LIFE?: NO
6. HAVE YOU EVER DONE ANYTHING, STARTED TO DO ANYTHING, OR PREPARED TO DO ANYTHING TO END YOUR LIFE?: NO

## 2023-01-01 ASSESSMENT — PAIN - FUNCTIONAL ASSESSMENT

## 2023-01-01 ASSESSMENT — ENCOUNTER SYMPTOMS
EYE REDNESS: 0
SEIZURES: 0
BACK PAIN: 0
DIZZINESS: 0
SINUS PRESSURE: 0
NECK PAIN: 0
PHOTOPHOBIA: 0
TROUBLE SWALLOWING: 0
BLOOD IN STOOL: 0
SEIZURES: 0
ABDOMINAL DISTENTION: 0
ARTHRALGIAS: 1
STRIDOR: 0
FLANK PAIN: 0
SORE THROAT: 0
OCCASIONAL FEELINGS OF UNSTEADINESS: 1
HEMATURIA: 0
MYALGIAS: 0
FREQUENCY: 0
DIARRHEA: 1
LOSS OF SENSATION IN FEET: 0
DIAPHORESIS: 0
FACIAL SWELLING: 0
EYE PAIN: 0
CHOKING: 0
CHILLS: 0
DIZZINESS: 0
DIFFICULTY URINATING: 0
ARTHRALGIAS: 0
VOMITING: 0
DIARRHEA: 1
HEMATURIA: 0
FEVER: 0
DEPRESSION: 0
LIGHT-HEADEDNESS: 0
WEAKNESS: 0
COUGH: 0
ABDOMINAL PAIN: 0
SORE THROAT: 0
CONSTIPATION: 0
CONSTIPATION: 0
DEPRESSION: 1
FATIGUE: 0
SHORTNESS OF BREATH: 0
WHEEZING: 0
LIGHT-HEADEDNESS: 0
LOSS OF SENSATION IN FEET: 0
WHEEZING: 0
DIFFICULTY URINATING: 0
HEADACHES: 0
SINUS PAIN: 0
PALPITATIONS: 0
SINUS PAIN: 0
FATIGUE: 0
DYSURIA: 0
CHEST TIGHTNESS: 0
SINUS PRESSURE: 0
MYALGIAS: 0
NUMBNESS: 0
FEVER: 0
NAUSEA: 0
APPETITE CHANGE: 1
BACK PAIN: 0
WOUND: 1
ACTIVITY CHANGE: 0
LOSS OF SENSATION IN FEET: 0
FREQUENCY: 0
NAUSEA: 0
DEPRESSION: 0
CHILLS: 0
CHEST TIGHTNESS: 0
OCCASIONAL FEELINGS OF UNSTEADINESS: 0
VOMITING: 0
DYSURIA: 0
OCCASIONAL FEELINGS OF UNSTEADINESS: 1
BLOOD IN STOOL: 0
ABDOMINAL DISTENTION: 0
COLOR CHANGE: 1
PALPITATIONS: 0
ABDOMINAL PAIN: 0
COUGH: 0
JOINT SWELLING: 0
NUMBNESS: 0
HEADACHES: 0
WOUND: 1
SHORTNESS OF BREATH: 0
PHOTOPHOBIA: 0

## 2023-01-01 ASSESSMENT — LIFESTYLE VARIABLES
EVER FELT BAD OR GUILTY ABOUT YOUR DRINKING: NO
AUDIT-C TOTAL SCORE: 0
HOW OFTEN DO YOU HAVE A DRINK CONTAINING ALCOHOL: NEVER
EVER HAD A DRINK FIRST THING IN THE MORNING TO STEADY YOUR NERVES TO GET RID OF A HANGOVER: NO
SKIP TO QUESTIONS 9-10: 1
SKIP TO QUESTIONS 9-10: 1
SUBSTANCE_ABUSE_PAST_12_MONTHS: NO
EVER FELT BAD OR GUILTY ABOUT YOUR DRINKING: NO
HOW OFTEN DO YOU HAVE 6 OR MORE DRINKS ON ONE OCCASION: NEVER
PRESCIPTION_ABUSE_PAST_12_MONTHS: NO
HAVE YOU EVER FELT YOU SHOULD CUT DOWN ON YOUR DRINKING: NO
HOW OFTEN DO YOU HAVE 6 OR MORE DRINKS ON ONE OCCASION: NEVER
HAVE YOU EVER FELT YOU SHOULD CUT DOWN ON YOUR DRINKING: NO
EVER HAD A DRINK FIRST THING IN THE MORNING TO STEADY YOUR NERVES TO GET RID OF A HANGOVER: NO
REASON UNABLE TO ASSESS: NO
HAVE YOU EVER FELT YOU SHOULD CUT DOWN ON YOUR DRINKING: NO
AUDIT-C TOTAL SCORE: 0
HOW OFTEN DO YOU HAVE A DRINK CONTAINING ALCOHOL: NEVER
SUBSTANCE_ABUSE_PAST_12_MONTHS: NO
REASON UNABLE TO ASSESS: NO
HAVE PEOPLE ANNOYED YOU BY CRITICIZING YOUR DRINKING: NO
AUDIT-C TOTAL SCORE: 0
HAVE PEOPLE ANNOYED YOU BY CRITICIZING YOUR DRINKING: NO
HAVE YOU EVER FELT YOU SHOULD CUT DOWN ON YOUR DRINKING: NO
HOW OFTEN DO YOU HAVE A DRINK CONTAINING ALCOHOL: NEVER
AUDIT-C TOTAL SCORE: 0
AUDIT-C TOTAL SCORE: 0
HAVE PEOPLE ANNOYED YOU BY CRITICIZING YOUR DRINKING: NO
HOW MANY STANDARD DRINKS CONTAINING ALCOHOL DO YOU HAVE ON A TYPICAL DAY: 1 OR 2
HOW OFTEN DO YOU HAVE 6 OR MORE DRINKS ON ONE OCCASION: NEVER
AUDIT-C TOTAL SCORE: 0
HAVE PEOPLE ANNOYED YOU BY CRITICIZING YOUR DRINKING: NO
EVER HAD A DRINK FIRST THING IN THE MORNING TO STEADY YOUR NERVES TO GET RID OF A HANGOVER: NO
HOW OFTEN DO YOU HAVE A DRINK CONTAINING ALCOHOL: NEVER
EVER FELT BAD OR GUILTY ABOUT YOUR DRINKING: NO
HOW MANY STANDARD DRINKS CONTAINING ALCOHOL DO YOU HAVE ON A TYPICAL DAY: PATIENT DOES NOT DRINK
REASON UNABLE TO ASSESS: NO
HOW MANY STANDARD DRINKS CONTAINING ALCOHOL DO YOU HAVE ON A TYPICAL DAY: PATIENT DOES NOT DRINK
EVER HAD A DRINK FIRST THING IN THE MORNING TO STEADY YOUR NERVES TO GET RID OF A HANGOVER: NO
PRESCIPTION_ABUSE_PAST_12_MONTHS: NO
SKIP TO QUESTIONS 9-10: 1
EVER FELT BAD OR GUILTY ABOUT YOUR DRINKING: NO
REASON UNABLE TO ASSESS: NO

## 2023-01-01 ASSESSMENT — PAIN DESCRIPTION - LOCATION
LOCATION: LEG
LOCATION: BACK

## 2023-01-01 ASSESSMENT — PAIN DESCRIPTION - FREQUENCY: FREQUENCY: INTERMITTENT

## 2023-01-01 ASSESSMENT — PAIN DESCRIPTION - ORIENTATION
ORIENTATION: RIGHT
ORIENTATION: LOWER

## 2023-01-01 ASSESSMENT — PATIENT HEALTH QUESTIONNAIRE - PHQ9
1. LITTLE INTEREST OR PLEASURE IN DOING THINGS: NOT AT ALL
SUM OF ALL RESPONSES TO PHQ9 QUESTIONS 1 & 2: 0
SUM OF ALL RESPONSES TO PHQ9 QUESTIONS 1 AND 2: 0
1. LITTLE INTEREST OR PLEASURE IN DOING THINGS: NOT AT ALL
2. FEELING DOWN, DEPRESSED OR HOPELESS: NOT AT ALL
SUM OF ALL RESPONSES TO PHQ9 QUESTIONS 1 AND 2: 0
2. FEELING DOWN, DEPRESSED OR HOPELESS: NOT AT ALL
SUM OF ALL RESPONSES TO PHQ9 QUESTIONS 1 & 2: 0
2. FEELING DOWN, DEPRESSED OR HOPELESS: NOT AT ALL
SUM OF ALL RESPONSES TO PHQ9 QUESTIONS 1 & 2: 0
2. FEELING DOWN, DEPRESSED OR HOPELESS: NOT AT ALL
1. LITTLE INTEREST OR PLEASURE IN DOING THINGS: NOT AT ALL
2. FEELING DOWN, DEPRESSED OR HOPELESS: NOT AT ALL

## 2023-01-01 ASSESSMENT — PAIN DESCRIPTION - ONSET: ONSET: ONGOING

## 2023-01-01 ASSESSMENT — PAIN DESCRIPTION - DESCRIPTORS
DESCRIPTORS: ACHING
DESCRIPTORS: BURNING

## 2023-01-01 ASSESSMENT — PAIN DESCRIPTION - PROGRESSION: CLINICAL_PROGRESSION: GRADUALLY WORSENING

## 2023-05-24 PROBLEM — S80.12XA CONTUSION OF LEFT LEG: Status: ACTIVE | Noted: 2023-01-01

## 2023-05-24 PROBLEM — I67.1 UNRUPTURED CEREBRAL ANEURYSM (HHS-HCC): Status: ACTIVE | Noted: 2023-01-01

## 2023-05-24 PROBLEM — Z95.0 CARDIAC PACEMAKER IN SITU: Status: ACTIVE | Noted: 2023-01-01

## 2023-05-24 PROBLEM — M79.672 FOOT PAIN, BILATERAL: Status: ACTIVE | Noted: 2023-01-01

## 2023-05-24 PROBLEM — K63.5 COLON POLYP: Status: ACTIVE | Noted: 2023-01-01

## 2023-05-24 PROBLEM — E55.9 VITAMIN D DEFICIENCY: Status: ACTIVE | Noted: 2023-01-01

## 2023-05-24 PROBLEM — M19.90 ARTHRITIS: Status: ACTIVE | Noted: 2023-01-01

## 2023-05-24 PROBLEM — H40.003 GLAUCOMA SUSPECT OF BOTH EYES: Status: ACTIVE | Noted: 2023-01-01

## 2023-05-24 PROBLEM — H34.8130: Status: ACTIVE | Noted: 2023-01-01

## 2023-05-24 PROBLEM — E78.5 HYPERLIPIDEMIA: Status: ACTIVE | Noted: 2023-01-01

## 2023-05-24 PROBLEM — M25.562 KNEE PAIN, LEFT: Status: ACTIVE | Noted: 2023-01-01

## 2023-05-24 PROBLEM — B35.1 ONYCHOMYCOSIS: Status: ACTIVE | Noted: 2023-01-01

## 2023-05-24 PROBLEM — M79.671 FOOT PAIN, BILATERAL: Status: ACTIVE | Noted: 2023-01-01

## 2023-05-24 PROBLEM — R21 RASH: Status: ACTIVE | Noted: 2023-01-01

## 2023-05-24 PROBLEM — M17.12 LEFT KNEE DJD: Status: ACTIVE | Noted: 2023-01-01

## 2023-05-24 PROBLEM — J31.0 RHINITIS: Status: ACTIVE | Noted: 2023-01-01

## 2023-05-24 PROBLEM — E32.9: Status: ACTIVE | Noted: 2023-01-01

## 2023-05-24 PROBLEM — R00.1 SINUS BRADYCARDIA: Status: ACTIVE | Noted: 2023-01-01

## 2023-05-24 PROBLEM — I48.0 PAROXYSMAL ATRIAL FIBRILLATION (MULTI): Status: ACTIVE | Noted: 2023-01-01

## 2023-05-24 PROBLEM — E61.1 IRON DEFICIENCY: Status: ACTIVE | Noted: 2023-01-01

## 2023-05-24 PROBLEM — S81.801A OPEN WOUND OF BOTH LOWER EXTREMITIES: Status: ACTIVE | Noted: 2023-01-01

## 2023-05-24 PROBLEM — R29.6 FREQUENT FALLS: Status: ACTIVE | Noted: 2023-01-01

## 2023-05-24 PROBLEM — R63.4 WEIGHT LOSS, UNINTENTIONAL: Status: ACTIVE | Noted: 2023-01-01

## 2023-05-24 PROBLEM — R55 SYNCOPE: Status: ACTIVE | Noted: 2023-01-01

## 2023-05-24 PROBLEM — L03.90 CELLULITIS: Status: ACTIVE | Noted: 2023-01-01

## 2023-05-24 PROBLEM — I95.9 HYPOTENSION: Status: ACTIVE | Noted: 2023-01-01

## 2023-05-24 PROBLEM — R76.0 ANTIPHOSPHOLIPID ANTIBODY POSITIVE: Status: ACTIVE | Noted: 2023-01-01

## 2023-05-24 PROBLEM — H53.461 RIGHT HOMONYMOUS HEMIANOPSIA: Status: ACTIVE | Noted: 2023-01-01

## 2023-05-24 PROBLEM — I89.0 LYMPHEDEMA: Status: ACTIVE | Noted: 2023-01-01

## 2023-05-24 PROBLEM — D35.00 ADRENAL ADENOMA: Status: ACTIVE | Noted: 2023-01-01

## 2023-05-24 PROBLEM — I05.9 MITRAL VALVE DISORDER: Status: ACTIVE | Noted: 2023-01-01

## 2023-05-24 PROBLEM — S81.802A OPEN WOUND OF BOTH LOWER EXTREMITIES: Status: ACTIVE | Noted: 2023-01-01

## 2023-05-24 PROBLEM — B37.9 CANDIDIASIS: Status: ACTIVE | Noted: 2023-01-01

## 2023-05-24 PROBLEM — H47.20 BILATERAL OPTIC NERVE ATROPHY: Status: ACTIVE | Noted: 2023-01-01

## 2023-05-24 PROBLEM — R60.9 EDEMA: Status: ACTIVE | Noted: 2023-01-01

## 2023-05-24 PROBLEM — E11.21: Status: ACTIVE | Noted: 2023-01-01

## 2023-05-24 PROBLEM — L40.50 PSORIASIS WITH ARTHROPATHY (MULTI): Status: ACTIVE | Noted: 2023-01-01

## 2023-05-24 PROBLEM — R26.89 GAIT, ANTALGIC: Status: ACTIVE | Noted: 2023-01-01

## 2023-05-24 PROBLEM — I87.2 CHRONIC VENOUS INSUFFICIENCY: Status: ACTIVE | Noted: 2023-01-01

## 2023-05-24 PROBLEM — K86.2 PANCREATIC CYST (HHS-HCC): Status: ACTIVE | Noted: 2023-01-01

## 2023-05-24 PROBLEM — S90.122A CONTUSION OF TOE OF LEFT FOOT, INITIAL ENCOUNTER: Status: ACTIVE | Noted: 2023-01-01

## 2023-05-24 PROBLEM — R53.1 GENERALIZED WEAKNESS: Status: ACTIVE | Noted: 2023-01-01

## 2023-05-24 PROBLEM — M10.9 GOUT: Status: ACTIVE | Noted: 2023-01-01

## 2023-05-24 PROBLEM — D49.89 ANTERIOR MEDIASTINAL TUMOR: Status: ACTIVE | Noted: 2023-01-01

## 2023-05-24 PROBLEM — I45.2 BIFASCICULAR BLOCK: Status: ACTIVE | Noted: 2023-01-01

## 2023-05-24 PROBLEM — I49.5 SINUS NODE DYSFUNCTION (MULTI): Status: ACTIVE | Noted: 2023-01-01

## 2023-05-24 PROBLEM — N18.4 CKD (CHRONIC KIDNEY DISEASE) STAGE 4, GFR 15-29 ML/MIN (MULTI): Status: ACTIVE | Noted: 2023-01-01

## 2023-05-24 PROBLEM — R26.89 BALANCE PROBLEM: Status: ACTIVE | Noted: 2023-01-01

## 2023-05-24 PROBLEM — L85.3 XEROSIS OF SKIN: Status: ACTIVE | Noted: 2023-01-01

## 2023-05-24 PROBLEM — I10 BENIGN ESSENTIAL HYPERTENSION: Status: ACTIVE | Noted: 2023-01-01

## 2023-06-05 PROBLEM — I10 BENIGN ESSENTIAL HYPERTENSION: Chronic | Status: ACTIVE | Noted: 2023-01-01

## 2023-06-05 PROBLEM — N18.4 CKD (CHRONIC KIDNEY DISEASE) STAGE 4, GFR 15-29 ML/MIN (MULTI): Chronic | Status: ACTIVE | Noted: 2023-01-01

## 2023-06-05 PROBLEM — K86.2 PANCREATIC CYST (HHS-HCC): Chronic | Status: ACTIVE | Noted: 2023-01-01

## 2023-06-05 PROBLEM — Z95.0 CARDIAC PACEMAKER IN SITU: Chronic | Status: ACTIVE | Noted: 2023-01-01

## 2023-06-05 PROBLEM — E11.21: Chronic | Status: ACTIVE | Noted: 2023-01-01

## 2023-06-05 NOTE — ASSESSMENT & PLAN NOTE
PCP emailed and discussed with Dr. Belcher that sees cyst on CT and states can wait 1-2 years for MRI (emailed 7/22). We can wait until 2023/2024

## 2023-06-05 NOTE — ASSESSMENT & PLAN NOTE
She is on Semgle 13 Units;  Hgba1c 7%  Controlled. Compliant with medications. Tolerating medications without issues. Continue with current plan.  -sugars are in low 100s. No lows and no highs.  Up to date with ophthalmology

## 2023-06-05 NOTE — PROGRESS NOTES
Subjective   Patient ID: Kira Solo is a 73 y.o. female who presents for Follow-up (The patient is here for a follow up and a HgA1c./).    Patient of Dr. Warner.    DM2  Checks once weekly in the morning around 100  No hypoglycemia episodes.    States she has a red itchy rash to groin. Had this under her breast last year and the ketoconazole shampoo worked great.    BP doing well.  She states she has no other concerns today.        Review of Systems  otherwise negative aside from what was mentioned above in HPI.    Objective   /70 (BP Location: Left arm, Patient Position: Sitting)   Pulse 78   Temp 36.6 °C (97.8 °F)   Resp 17   Wt 82.6 kg (182 lb)   SpO2 98%   BMI 38.04 kg/m²     Physical Exam  Constitutional:       Appearance: Normal appearance. She is normal weight.   Eyes:      Conjunctiva/sclera: Conjunctivae normal.   Cardiovascular:      Rate and Rhythm: Normal rate and regular rhythm.   Pulmonary:      Effort: Pulmonary effort is normal.      Breath sounds: Normal breath sounds.   Abdominal:      General: Bowel sounds are normal.      Palpations: Abdomen is soft.      Tenderness: There is no right CVA tenderness or left CVA tenderness.   Skin:     Capillary Refill: Capillary refill takes less than 2 seconds.   Neurological:      General: No focal deficit present.      Mental Status: She is alert and oriented to person, place, and time. Mental status is at baseline.   Psychiatric:         Mood and Affect: Mood normal.         Behavior: Behavior normal.         Thought Content: Thought content normal.         Judgment: Judgment normal.         Assessment/Plan   Problem List Items Addressed This Visit          Circulatory    Benign essential hypertension (Chronic)     Stable on current meds         Cardiac pacemaker in situ (Chronic)     Following with cardiology            Digestive    Pancreatic cyst (Chronic)     PCP emailed and discussed with Dr. Belcher that sees cyst on CT and states can  wait 1-2 years for MRI (emailed 7/22). 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 Compliant with medications. Tolerating medications without issues. Continue with current plan.  -sugars are in low 100s. No lows and no highs.  Up to date with ophthalmology            Infectious/Inflammatory    Candidiasis    Relevant Medications    ketoconazole (NIZOral) 2 % shampoo     Other Visit Diagnoses       Type 2 diabetes mellitus with other specified complication, with long-term current use of insulin (CMS/[facility])    -  Primary    Relevant Orders    POCT glycosylated hemoglobin (Hb A1C) manually resulted (Completed)

## 2023-09-09 PROBLEM — D48.5 NEOPLASM OF UNCERTAIN BEHAVIOR OF SKIN: Status: ACTIVE | Noted: 2022-08-27

## 2023-09-09 PROBLEM — Z98.890 HISTORY OF PANRETINAL PHOTOCOAGULATION: Status: ACTIVE | Noted: 2017-06-23

## 2023-09-09 PROBLEM — H40.53X0: Status: ACTIVE | Noted: 2017-06-23

## 2023-09-09 PROBLEM — L23.9 ALLERGIC DERMATITIS: Status: ACTIVE | Noted: 2018-09-21

## 2023-09-09 PROBLEM — H34.13: Status: ACTIVE | Noted: 2017-06-23

## 2023-09-09 PROBLEM — H54.7 VISION IMPAIRMENT: Status: ACTIVE | Noted: 2018-01-26

## 2023-09-09 PROBLEM — I33.9: Status: ACTIVE | Noted: 2017-05-19

## 2023-09-09 PROBLEM — H54.7 DECREASED VISUAL ACUITY: Status: ACTIVE | Noted: 2017-12-13

## 2023-09-09 PROBLEM — I63.81 MULTIPLE LACUNAR INFARCTS (MULTI): Status: ACTIVE | Noted: 2018-08-07

## 2023-09-09 PROBLEM — H93.A1 PULSATILE TINNITUS OF RIGHT EAR: Status: ACTIVE | Noted: 2017-12-29

## 2023-09-19 NOTE — TELEPHONE ENCOUNTER
Pts  called in and states that pt has a derm problem and he was not sure if she needed to be referred to someone or not.    Do you want the juan j and gilmar in to be given to them also?    Please advise.

## 2023-10-04 NOTE — PROGRESS NOTES
Patient is here today for a follow up on right leg wound.    Recently got Covid and flu vaccine and feels unwell.  Denies any fever or chills otherwise.  Right leg ulcer with drainage over the past few days.  Her  is doing dressing changes but they just started this late last week.  Denies any malodor.  No changes to past medical history otherwise.    Constitutional: Patient alert.  No acute distress.  Psychiatric: Normal mood and affect.  HEENT: Sclera clear.    Respiratory: Breathing unlabored.  Dermatologic: Ulcer anterior lower leg continues.  This is a nonpressure ulcer right lower extremity.  Secondary to venous stasis.  Fibrogranular base.  Measures 8 x 7 cm.  0.1 in depth.  Post excisional subcutaneous debridement increases in depth by 0.1 cm total.  This was well-tolerated.  Dermal curette utilized.  No other ulcers or preulcerative areas.  Stable onychomycosis.  Vascular: Pedal pulses are palpable.  Feet are warm to touch.  Bilateral edema.  Musculoskeletal baseline for the patient.  No acute interval changes noted.  Neurological grossly intact and symmetric.    -Today's treatment and course of therapy was discussed with the patient in detail. Patient's questions were answered. Proper foot care was discussed. This dictation was done using Dragon computer software and as such may contain grammatical errors.    -Excisional subcutaneous debridement performed.  Well-tolerated.    -Wound culture taken.    -At home daily dressing changes using gentamicin wash with saline.  Use the alginate dressings at yet.  Compression dressing.    -Recommended home health care which you have declined.    -Follow-up 1 week.    Debridement    Consent obtained? verbal  Consent given by: patient  Risks discussed? procedural risks discussed  Immediately prior to the procedure a time out was called  Debridement type: surgical  Level of debridement: subcutaneous tissue    Pre-debridement measurements  Length (cm): 8  Width  (cm): 7  Depth (cm): 0.1  Surface Area (cm^2): 56  Volume (cm^3): 5.6    Post-debridement measurements  Length (cm): 8  Width (cm): 7  Depth (cm): 0.2  Percent debrided: 100%  Surface Area (cm^2): 56  Area debrided (cm^2): 56  Volume (cm^3): 11.2  Tissue and other material debrided: adipose  Devitalized tissue debrided: biofilm  Instrument(s) utilized: curette  Bleeding: small  Hemostasis obtained with: pressure  Procedural pain (0-10): 1  Post-procedural pain: 1   Response to treatment: procedure was tolerated well

## 2023-10-09 NOTE — PROGRESS NOTES
Venous Duplex      Indications:  Limb pain  Leg Edema    Sonographer: Maria Del Carmen Connolly RVT    TECHNIQUE:  Venous Duplex ultrasound spectral Doppler wave modality was performed with the patient in the supine and upright positions to determine the status of the deep and superficial systems of the right and left lower extremity. The common femoral, femoral and popliteal veins of the deep venous system were imaged. The superficial system was imaged entirely to include, but was not limited to, the saphenous-femoral junction (SFJ) down the greater saphenous vein from the groin to the ankle, and the saphenous-popliteal junction (SPJ), if present, at the popliteal fossa down the small saphenous vein (SSV) to the ankle. As indicated, the cranial extensions of the SSV (CESSV), the anterior accessory GSV (AAGSV), and the posterior accessory GSV (PAGSV) were also evaluated, if present. All areas meeting the criteria for venous reflux (500 milliseconds or greater in the saphenous veins and principle branches, 350 milliseconds in perforators and 1000 milliseconds in the deep system) were confirmed with spectral Doppler analysis and confirmatory images were documented:     FINDINGS ARE THE FOLLOWING:    RIGHT LEG:  There is no evidence of thrombus in the interrogated segments of the deep or superficial venous system. There is no evidence of deep venous reflux. There is incompetence of the saphenous-femoral junction.    GSV dimensions: 8.6mm junction   proximal segment is not visualized   mid segment is not visualized   distal segment is not visualized  The Greater Saphenous Vein appears to be absent, consistent with previous treatment     SSV dimensions: 4.3mm junction  segment is not visualized  The Small Saphenous Vein appears to be absent, consistent with previous treatment    AAGSV dimensions: 4.4mm   There is <0.5 seconds of reflux in the Anterior Accessory Saphenous Vein    PAGSV dimensions: 5.6mm   There is >0.5 seconds of  reflux in the Posterior Accessory Saphenous Vein    There are multiple incompetent tributaries along the thigh and lower leg.    Trib1 dimensions: 4.6mm  Trib2 dimensions: 3.2mm  There is >0.5 seconds of reflux in the tributaries       LEFT LEG:  There is no evidence of thrombus in the interrogated segments of the deep or superficial venous system. There is no evidence of deep venous reflux.     GSV dimensions: 6.9mm junction   Proximal segment is not visualized   Mid segment is not visualized   Distal segment is not visualized  The Greater Saphenous Vein appears to be absent, consistent with previous treatment    SSV dimensions: 4.4mm junction  3.5mm mid  There is <0.5 seconds of reflux in the Small Saphenous Vein    There are multiple incompetent tributaries along the thigh and lower leg.    Trib1 dimensions: 3.2mm  Trib2 dimensions: 3.1mm  There is >0.5 seconds of reflux in the tributaries    Technically difficult exam due to PT body habitus.       CONCLUSIONS:  Dilation and incompetence of the RT PAGSV which is refluxing from the deep junction.  There is no evidence of thrombus in the interrogated segments of the deep or superficial venous system in both legs.  No evidence of deep venous reflux.  Numerous incompetent tributaries are seen in both legs as noted in the MAP measured more than 3 mm in diameter.    Incidental finding of right groin oval shape hypoechogenic structure with central echogenic area measured 8.8 x 15.1 mm in diameter, possibly a lymph node identified.   Subcutaneous edema seen throughout the exam.  Also technically difficult study.  Clinical correlation advised.

## 2023-10-11 NOTE — PROGRESS NOTES
Chief Complaint   Patient presents with    Wound Check     Right leg   Follow-up right lower extremity.  Daily dressing changes.  Feels better less swelling less drainage.  No constitutional symptoms.  No changes past medical history review of system.  No other changes or complaints today.    Constitutional: Patient alert.  No acute distress.  Psychiatric: Normal mood and affect.  HEENT: Sclera clear.  Respiratory: Breathing unlabored.  Culture sensitivity: Pseudomonas and Enterococcus faecalis.  Dermatologic: Nonpressure venous stasis ulcer anterior right lower extremity.  Mostly granular base.  Small amount of fibrin noted.  No malodor no purulent discharge.  Minimal serous drainage appreciated.  No expressible fluid.  Remainder Derm findings are baseline for patient including chronic stasis dermatitis onychomycosis and xerosis cutis.  Ulcerated area measures and irregular 5 x 3 x 0.1 cm this is on the anterior right lower leg.  No deep tracking or undermining.  No exposed structures.  No expressible fluid.  No evidence of abscess.  Previous erythema has greatly improved  Vascular: Baseline chronic venous insufficiency.  Pedal pulses are palpable.  Feet warm to touch.  Neurological: Grossly intact and symmetric.  No deficits noted.  Musculoskeletal grossly intact and symmetric baseline for the patient using a walker.    -Today's treatment and course of therapy was discussed with the patient in detail. Patient's questions were answered. Proper foot care was discussed. This dictation was done using Dragon computer software and as such may contain grammatical errors.    -Continue daily dressing changes to the right lower leg.  Cleanse with Vashe or saline or similar.    -Discussed lab findings.  Patient allergic to penicillin.  Will use topical as clinically it does appear to be improving.  Stressed the importance of proper hygiene and proper wound care and cleansing.    -Apply alginate dressing 4 x 4's and  compression as you have been doing.  If any increased redness pain swelling or drainage please let me know.  Follow-up 1 week.  If you would like home health care please let me know.    -We will order alginate, saline, Vashe, and dressings for her.

## 2023-10-17 PROBLEM — I48.20 CHRONIC ATRIAL FIBRILLATION (MULTI): Status: ACTIVE | Noted: 2017-04-01

## 2023-10-17 PROBLEM — I33.0 ACUTE AND SUBACUTE INFECTIVE ENDOCARDITIS (HHS-HCC): Status: ACTIVE | Noted: 2017-03-31

## 2023-10-17 PROBLEM — M10.9 GOUT, UNSPECIFIED: Status: ACTIVE | Noted: 2017-04-01

## 2023-10-17 PROBLEM — R26.2 DIFFICULTY IN WALKING, NOT ELSEWHERE CLASSIFIED: Status: ACTIVE | Noted: 2017-04-01

## 2023-10-17 PROBLEM — I48.20 CHRONIC ATRIAL FIBRILLATION (MULTI): Chronic | Status: ACTIVE | Noted: 2017-04-01

## 2023-10-17 PROBLEM — N18.30 STAGE 3 CHRONIC KIDNEY DISEASE (MULTI): Status: ACTIVE | Noted: 2017-04-01

## 2023-10-17 PROBLEM — L08.9 WOUND INFECTION: Status: ACTIVE | Noted: 2023-01-01

## 2023-10-17 PROBLEM — E11.8 TYPE 2 DIABETES MELLITUS WITH UNSPECIFIED COMPLICATIONS (MULTI): Chronic | Status: ACTIVE | Noted: 2017-04-01

## 2023-10-17 PROBLEM — E78.5 HYPERLIPIDEMIA, UNSPECIFIED: Status: ACTIVE | Noted: 2017-04-01

## 2023-10-17 PROBLEM — E11.8 TYPE 2 DIABETES MELLITUS WITH UNSPECIFIED COMPLICATIONS (MULTI): Status: ACTIVE | Noted: 2017-04-01

## 2023-10-17 PROBLEM — T14.8XXA WOUND INFECTION: Status: ACTIVE | Noted: 2023-01-01

## 2023-10-17 PROBLEM — I63.40 CEREBRAL INFARCTION DUE TO EMBOLISM OF UNSPECIFIED CEREBRAL ARTERY (MULTI): Status: ACTIVE | Noted: 2017-03-31

## 2023-10-17 PROBLEM — I10 ESSENTIAL (PRIMARY) HYPERTENSION: Chronic | Status: ACTIVE | Noted: 2017-04-01

## 2023-10-17 PROBLEM — M62.81 MUSCLE WEAKNESS (GENERALIZED): Status: ACTIVE | Noted: 2017-04-01

## 2023-10-17 PROBLEM — I34.0 NONRHEUMATIC MITRAL (VALVE) INSUFFICIENCY: Status: ACTIVE | Noted: 2017-04-01

## 2023-10-17 PROBLEM — K21.9 GASTRO-ESOPHAGEAL REFLUX DISEASE WITHOUT ESOPHAGITIS: Status: ACTIVE | Noted: 2017-04-01

## 2023-10-17 PROBLEM — E78.5 HYPERLIPIDEMIA, UNSPECIFIED: Chronic | Status: ACTIVE | Noted: 2017-04-01

## 2023-10-17 PROBLEM — I89.0 LYMPHEDEMA, NOT ELSEWHERE CLASSIFIED: Status: ACTIVE | Noted: 2017-04-06

## 2023-10-17 PROBLEM — I87.2 VENOUS INSUFFICIENCY (CHRONIC) (PERIPHERAL): Status: ACTIVE | Noted: 2017-03-31

## 2023-10-17 PROBLEM — H34.13: Status: ACTIVE | Noted: 2017-04-01

## 2023-10-17 PROBLEM — I10 ESSENTIAL (PRIMARY) HYPERTENSION: Status: ACTIVE | Noted: 2017-04-01

## 2023-10-17 PROBLEM — L40.9 PSORIASIS, UNSPECIFIED: Status: ACTIVE | Noted: 2017-04-01

## 2023-10-17 PROBLEM — B95.1 STREPTOCOCCUS, GROUP B, AS THE CAUSE OF DISEASES CLASSIFIED ELSEWHERE: Status: ACTIVE | Noted: 2017-03-31

## 2023-10-17 PROBLEM — I69.398 OTHER SEQUELAE OF CEREBRAL INFARCTION: Status: ACTIVE | Noted: 2017-03-31

## 2023-10-17 NOTE — ED NOTES
Patient to room 18; resident at bedside.     Tea Najera RN  10/17/23 1430    Two Rns attempted IV on the patient and could not obtain; resident to place IV via ultrasound       Tea Najera RN  10/17/23 4901    Patient positioned for comfort; cart in locked, lowest position; no further needs voiced at this time.     Tea Najera RN  10/17/23 8882

## 2023-10-17 NOTE — PROGRESS NOTES
Chief Complaint   Patient presents with    wound right leg    Follow-up   Follow-up ulcer right lower extremity.  No real complaints.  Minimal discomfort.  Denies any constitutional symptoms.  No fever or chills.  Daily dressing changes.  There is more drainage.      Constitutional: Patient alert.  No acute distress.  Psychiatric: Normal mood and affect.  HEENT: Sclera clear.  Respiratory: Breathing unlabored.  Culture sensitivity: Pseudomonas and Enterococcus faecalis.  Dermatologic: Continues a large ulcerated area anterior right lower extremity.  Subcutaneous tissue exposed.  It is larger than previous.  Measures 8 x 8 cm.  Some malodor serous drainage.  Large amount of drainage appreciated.  This is serous drainage.  No purulence.  No expressible fluid.  Leg is warm erythematous.  No deep tracking or undermining.  No exposed structures.  No expressible fluid.  No evidence of abscess.  More red than prior visit.  Vascular: Baseline chronic venous stasis.  Pedal pulses are palpable.  Feet warm to touch.  Neurological: Grossly intact and symmetric.  No deficits noted.  Musculoskeletal grossly intact and symmetric baseline for the patient using a walker.     -Today's treatment and course of therapy was discussed with the patient in detail. Patient's questions were answered. Proper foot care was discussed. This dictation was done using Dragon computer software and as such may contain grammatical errors.     -Debridement performed as described.  Topical lidocaine use.    -Given her multiple antibiotic allergies and her previous cultures I recommended patient be admitted for further evaluation and management.  She does seem agreeable to this however prefers to go to [facility] rather than [place].  If primary care is not able to admit recommend going to the emergency department.    -Keep your appointment with your vascular specialist this afternoon.    Debridement    Consent obtained? verbal  Consent given by:  patient  Risks discussed? procedural risks discussed  Immediately prior to the procedure a time out was called  Performed by: physician  Debridement type: surgical  Level of debridement: subcutaneous tissue  Pain control: lidocaine 2%  Pre-debridement measurements  Length (cm): 8  Width (cm): 8  Depth (cm): 0.2  Surface Area (cm^2): 64  Volume (cm^3): 12.8    Post-debridement measurements  Length (cm): 8.2  Width (cm): 8.2  Depth (cm): 0.3  Percent debrided: 100%  Surface Area (cm^2): 67.24  Area debrided (cm^2): 67.24  Volume (cm^3): 20.17  Tissue and other material debrided: adipose  Devitalized tissue debrided: fibrin  Instrument(s) utilized: rongeur  Bleeding: medium  Hemostasis obtained with: pressure  Procedural pain (0-10): 1  Post-procedural pain: 2   Response to treatment: procedure was tolerated well

## 2023-10-17 NOTE — ED PROVIDER NOTES
Chief Complaint   Patient presents with   • Injury     right heel injured almost 2 mo ago has been sore since.  Last week felt something pop in heel.       SUBJECTIVE:  Otf is a 68 year old male who presents with right heel pain that seemed to begin when he fell off of a deck step about 8 weeks ago and twisted his foot or landed on it a little funny. He states that he did not fall down.  He states that ever since then his heel has been bothering him. He states that it is the worst when he first gets up in the morning with the first couple of steps. After he walks around for a minute or 2 it loosens up and is not as sharply painful. He does experience sits if he is been sitting in a chair for a while and gets up as well. Last week he states he was walking into [email or web address] and felt a sudden sharp pain in his right heel again. He states that it is seems to be reaggravated and a little worse since that time.   He also states that he has been wearing slippers or sandals both because it is summertime and because his left second toe has been bothering him and is uncomfortable in a normal lace-up shoe.    Current medications:  Current Outpatient Prescriptions   Medication Sig Dispense Refill   • cannabidiol (CBD) Take by mouth 2 times daily.     • dilTIAZem (DILTIAZEM CD) 180 MG 24 hr capsule Take 1 capsule by mouth daily. 90 capsule 1     No current facility-administered medications for this visit.        ALLERGIES:  Allopurinol and Hydrochlorothiazide    OBJECTIVE:  VITAL SIGNS:    Visit Vitals  /90   Temp 97.8 °F (36.6 °C)   Ht 5' 9\" (1.753 m)   Wt (!) 146.5 kg   BMI 47.70 kg/m²     GENERAL:  Well developed, well nourished and alert 68 year old male in no acute distress.    RIGHT FOOT : Limitation of his right foot and ankle reveal some chronic ankle edema and skin discoloration, however he has good range of motion of his ankle with no tenderness on palpation. His right foot does reveal a flattened arch.  HPI   Chief Complaint   Patient presents with   • Wound Check     Pt has lympedema on right leg, pt was sent here by MD for IV ATB since pt is allergic to many atb             73 year old female lymphedema, CKD, venous insufficiency presenting for RLE ulcer.  She has been seen for her ulcer by podiatry sent of IV abx treatment as she has allergies to several PO medications.  Her allergies include ciprofloxacin, contrast, iodine, dairy, shellfish, statins, sulfa, penicillins, adalimumab, metformin, gluten protein.           Tomas Coma Scale Score: 15                  Patient History   Past Medical History:   Diagnosis Date   • Anemia, unspecified 10/09/2020    Acute anemia   • Benign neoplasm of brain, unspecified (CMS/[facility]) 04/24/2017    Benign brain tumor   • Candidiasis of skin and nail 06/09/2017    Candidiasis, cutaneous   • Central retinal artery occlusion, left eye 05/04/2022    Central retinal artery occlusion, left eye   • Central retinal artery occlusion, right eye 05/04/2022    Central retinal artery occlusion, right eye   • Diarrhea, unspecified 07/31/2020    Acute diarrhea   • Endocarditis, valve unspecified 01/18/2019    Endocarditis   • Essential (primary) hypertension 12/07/2022    Benign essential hypertension   • Glaucoma secondary to other eye disorders, bilateral, stage unspecified 05/04/2022    Neovascular glaucoma of both eyes   • Nonrheumatic mitral (valve) insufficiency 04/24/2017    Mitral regurgitation   • Other vascular disorders of iris and ciliary body, left eye 05/04/2022    Rubeosis iridis of left eye   • Other vascular disorders of iris and ciliary body, right eye 05/04/2022    Rubeosis iridis of right eye   • Personal history of other diseases of the circulatory system     History of hypertension   • Personal history of other diseases of urinary system     History of chronic kidney disease   • Septic arterial embolism (CMS/[facility]) 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 Tenderness is reproduced at the insertion point of the plantar fascia on the calcaneus.  LEFT FOOT: Inspection specifically of the left second toe reveals a growth that starts on the underside of his toe and comes up over his toenail but is not connected to the toenail. His toenail appears to be split in half around this growth. No erythema or signs of infection. Toe is tender on palpation at the insertion point of this growth on the bottom of his toe.    ASSESSMENT:  1. Plantar fasciitis of right foot    2. Acquired deformity of left toe        PLAN:  1.  Recommended good arch support and heel cushioning as plantar fasciitis. Did give him some exercises and handout for this. Due to his left second toe growth and irregular toenail it is difficult to wear good shoes, therefore, it is recommended he return for removal of this growth as well as removal of the left second toenail to make it possible for him to wear appropriate shoes for good support. He will schedule in the near future for removal of the left foot second toenail and growth.  Dr. Celio Laureano, who serves as my supervising physician, was available for questioning regarding formulation of diagnosis, plan, and review of patient care. If Dr. Celio Laureano is unavailable, an alternate supervising physician, Dr. Yvette Wright will cover Dr. Celio Laureano responsibilities based on an established yearly written agreement and daily availability of said physicians.       embolism (CMS/HCC) 03/28/2017    Cerebral septic emboli   • Unspecified color vision deficiencies 03/28/2017    Visual color changes   • Unspecified retinal vascular occlusion 05/04/2022    Retinal vascular occlusion of both eyes   • Unspecified visual disturbance 03/28/2017    Change in vision   • Visual hallucinations 04/24/2017    Formed visual hallucinations     Past Surgical History:   Procedure Laterality Date   • CT HEAD ANGIO W AND WO IV CONTRAST  3/28/2017    CT HEAD ANGIO W AND WO IV CONTRAST 3/28/2017 [facility] CLINICAL LEGACY   • CT NECK ANGIO W AND WO IV CONTRAST  3/28/2017    CT NECK ANGIO W AND WO IV CONTRAST 3/28/2017 [facility] CLINICAL LEGACY   • MR HEAD ANGIO WO IV CONTRAST  3/28/2018    MR HEAD ANGIO WO IV CONTRAST 3/28/2018 [facility] ANCILLARY LEGACY   • OTHER SURGICAL HISTORY  05/27/2022    Pacemaker insertion   • OTHER SURGICAL HISTORY  05/22/2017    Craniotomy Tumor Removal - Complete   • OTHER SURGICAL HISTORY  07/01/2019    Colonoscopy complete for polypectomy   • OTHER SURGICAL HISTORY  07/01/2019    Endoscopy   • TONSILLECTOMY  05/15/2014    Tonsillectomy     Family History   Problem Relation Name Age of Onset   • Other (adopted child) Mother     • Other (adopted child) Father       Social History     Tobacco Use   • Smoking status: Never   • Smokeless tobacco: Never   Vaping Use   • Vaping Use: Never used   Substance Use Topics   • Alcohol use: Not Currently   • Drug use: Never       Physical Exam   ED Triage Vitals [10/17/23 1415]   Temp Heart Rate Resp BP   36.1 °C (97 °F) 86 16 158/70      SpO2 Temp Source Heart Rate Source Patient Position   98 % Temporal Monitor Sitting      BP Location FiO2 (%)     Right arm --       Physical Exam    General: Alert, no acute distress, well developed, Well hydrated  Head: NCAT  Eyes: Clear conjunctiva, EOMI  ENT: Moist mucosa, no lymphadenopathy  Respiratory: Normal respiratory effort. CTAB. Symmetric aeration. No wheezes, rales, or Rhonchi.  CV: Normal rhythm,  Normal Rate, pulses present bilaterally  GI: Soft, NT, no guardingdistention, umbilical hernia, No palpable mass.  : no flank tenderness, no cva tenderness  MSK: large ulcer with subcutaneous tissue exposed on RLE with serous drainage likely secondary to lymphedema   Skin: Warm, c/d/i  Neuro: AOx3, facial symmetry,   sensorimotor intact in extremities,   CN intact, Decreased vision       ED Course & MDM   Diagnoses as of 10/17/23 2228   Wound infection   Venous stasis   Lymphedema     Due to her large ulcer with subcutaneous tissue exposed on RLE with serous drainage likely secondary to lymphedema She was given IV Zosyn after ultrasound IV was placed in her right arm.  CRP was elevated at 4.71.  BUN 35, creatinine 1.38.  Hemoglobin of 11.1 with MCV of 95.  Normocytic anemia.  Possible iron deficiency. She will need admission for continuing LLE wound treatment with IV abx.    External Records Reviewed: I reviewed recent and relevant outside records including: prior outpatient visits this month  Independent Interpretation of Studies: I independently interpreted: As above  Social Determinants Affecting Care: Diagnostic testing considered: As above    I reviewed the case with the attending ER physician. Patient and/or patient´s representative was counseled regarding labs, imaging, likely diagnosis, and plan.     Lilliam Lindsay MD MS  PGY-1, Emergency Medicine    The above documentation was completed with the use of speech recognition software. It may contain dictation errors secondary to limitations of the software.        Lilliam Lindsay MD  Resident  10/17/23 1848       Lilliam Lindsay MD  Resident  10/17/23 2228

## 2023-10-17 NOTE — PROGRESS NOTES
There is an office visit note for 10/17/2023 visit.  Patient's chart is locked for unknown reasons and the [facility] could not have me documented in the patient's encounter today so this is dictated as a progress note.  Patient is in the emergency room at this time of the dictation.    I saw the patient in my office with the  in the room for the follow-up after patient's detailed ultrasound evaluation.  History of present illness  Patient is a 73 years old female with the multiple chronic medical problems and also has lymphedema with history of chronic venous insufficiency and has had chronic venous ulcers.  Patient has had lymphedema treatment.  Patient was treated for her chronic venous ulcers in the wounds and and also has had venous insufficiency intervention.  Patient improved and her wounds healed.  Now patient has again having leg ulcers and she was treated by podiatric and also she had a detailed ultrasound evaluation for her follow-up of her chronic venous insufficiency and she came for further discussions.    Patient is generally feeling tired.  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  She also has gained weight.  She has hard time wearing the graduated compression stockings.    She denies any fever no chills.    Patient's medications reviewed in detail at the time of the visit.    Patient's allergies reviewed which are multiple.    Social history family history reviewed.  Patient lives with the  she is a non-smoker.  She denies any drug or alcohol abuse.  She has the family visiting this weekend and she would like to be home around that time.    Looks older than the stated age, chronically ill, well-nourished  with no apparent distress. Alert oriented  Skin:  Normal turgor.  Has chronic rash under the breast, on the waist area which were treated as candidiasis.  Mild redness in this area noted.  No ulcerations in this area  Head:  Normocephalic, atraumatic.  Eyes: Patient has poor  vision  No pallor of conjunctivae.  Mouth has moist oral mucosa.   Neck:  Supple.  No JVD.  No carotid bruit.  No thyromegaly. No cervical lymphadenopathy.  No clubbing  Chest:  Vesicular breathing Bilaterally moderate air entry.  No wheezing.  No crackles.  Heart:  Regular rate and rhythm.  S1, S2 positive.  No murmur.  Abdomen: Obese soft and nontender.  Bowel sounds are positive.  No organomegaly.  Extremities:  Bilaterally patient has chronic lymphedema pronounced up to the knee.  She has chronic scarring seen in both lower legs with inverted champagne bottle appearance.  Has ulcers in both legs.  Has evidence of cellulitis in the right leg with swellings of the foot.  Has wound dressings which are left intact.  No calf tenderness. Homans sign is negative.  Neuro Exam: No focal signs noted, limited exam.    I have reviewed the records and patient has had wound cultures which are growing Pseudomonas and other organisms.    Patient's needed ultrasound valuation was reviewed briefly with the patient and the  shows right leg there is refluxing tributaries more than the left leg.  But the truncal veins which were previously treated closed.    At this time patient is high risk for any procedures and she needs conservative management including graduated compression stocking and lymphedema therapy but above overall she has evidence of cellulitis with chronic wound.  Patient needs at this time probably IV antibiotics and close wound care.  This was discussed with the patient and her .  As per the podiatric who is taking care of the wound and also my evaluation it was advised for the patient to go to the emergency room.  I called the emergency room physician and spoke to him about patient's condition and she will have further evaluation in the emergency room.  I would follow the patient in about 3 months time for further evaluation and decide about any intervention for the chronic venous insufficiency.  But  at this time patient should do the conservative management.  She also was advised to follow-up with the PCP for her regular care for her weight gain and the only other chronic problems.

## 2023-10-18 NOTE — CARE PLAN
The patient's goals for the shift include GETTING THRU IT    The clinical goals for the shift include WOUND CARE  ORDER FOR CONSULT FOR WOUND CARE NURSE.  PICUTURE OF RLE VASCULAR ULCER TAKEN WITH MEASUREMENTS.  MEPITEL1 X6 AND KERLIX WRAP TO RLE  PER DR. JOHNSON.  Problem: Skin  Goal: Decreased wound size/increased tissue granulation at next dressing change  10/18/2023 0432 by Dahlia Banuelos RN  Outcome: Progressing  10/18/2023 0355 by Dahlia Banuelos RN  Outcome: Progressing  10/18/2023 0344 by Dahlia Banuelos RN  Outcome: Progressing  Goal: Participates in plan/prevention/treatment measures  10/18/2023 0432 by Dahlia Banuelos RN  Outcome: Progressing  10/18/2023 0355 by Dahlia Banuelos RN  Outcome: Progressing  10/18/2023 0344 by Dahlia Banuelos RN  Outcome: Progressing  Goal: Prevent/manage excess moisture  10/18/2023 0432 by Dahlia Banuelos RN  Outcome: Progressing  10/18/2023 0355 by Dahlia Banuelos RN  Outcome: Progressing  10/18/2023 0344 by Dahlia Banuelos RN  Outcome: Progressing  Goal: Prevent/minimize sheer/friction injuries  10/18/2023 0432 by Dahlia Banuelos RN  Outcome: Progressing  10/18/2023 0355 by Dahlia Banuelos RN  Outcome: Progressing  10/18/2023 0344 by Dahlia Banuelos RN  Outcome: Progressing  Goal: Promote/optimize nutrition  10/18/2023 0432 by Dahlia Banuelos RN  Outcome: Progressing  10/18/2023 0355 by Dahlia Banuelos RN  Outcome: Progressing  10/18/2023 0344 by Dahlia Banuelos RN  Outcome: Progressing  Goal: Promote skin healing  10/18/2023 0432 by Dahlia Banuelos RN  Outcome: Progressing  10/18/2023 0355 by Dahlia Banuelos RN  Outcome: Progressing  10/18/2023 0344 by Dahlia Banuelos RN  Outcome: Progressing

## 2023-10-18 NOTE — H&P
History Of Present Illness  Kira Solo is a 73 y.o. female with PMH significant for A-fib (on Xarelto, history of bifascicular block), CKD 4 (baseline CR 1.5-1.9), IDDM-2, HLD, HTN,  history of MV  endocarditis (group B strep), lacunar CVA history of cerebral aneurysm and meningioma.  Patient presented to the ED on 10/17/2023 from podiatrist office at request of podiatrist Dr. Cain.  Patient has chronic right lower extremity wound that has been present for a few weeks.  Patient attended her appointment with Dr. Cain this morning, where he was concerned for infection prior to debridement and recommended that patient come in for IV antibiotic treatment.  Patient has no associated symptoms with this right lower extremity ulcer, denies any pain and is able to ambulate with walker at home and attends exercise classes every morning with her .  Patient's  and daughter were present for the encounter.    In the ED:  -Vitals: Temp 97 F, RR 16, HR 86, //90, SPO2 98% ORA  -Labs: CMP: Chloride 110 BUN//1.38, EGFR 40, CRP 4.71, glucose 134             CBC: Hgb 11.1             UA: 2+ protein, 1+ leukocyte esterase  -Imaging: None obtained  -Interventions: Zosyn, wound culture obtained    PMH: A-fib (on Xarelto, history of bifascicular block, CKD 4 (baseline CR 1.5-1.9), IDDM-2, HLD, HTN,  history of MV  endocarditis (group B strep), lacunar CVA history of cerebral aneurysm and meningioma  Allergies: Adalimumab, metformin, ciprofloxacin, gluten, iodinated contrast, iodine, dairy, shellfish, statins, sulfa drugs, iodides, penicillins  FH: No family history as patient was adopted  SXH: Craniotomy (meningioma removal), tonsillectomy, transvenous pacemaker placement, endoscopic, colonoscopy, pacemaker  SH: Denies tobacco usage, alcohol usage, illicit drug usage, lives with     CODE STATUS: DNR DNI     Past Medical History  Past Medical History:   Diagnosis Date    Anemia, unspecified  10/09/2020    Acute anemia    Benign neoplasm of brain, unspecified (CMS/[facility]) 04/24/2017    Benign brain tumor    Candidiasis of skin and nail 06/09/2017    Candidiasis, cutaneous    Central retinal artery occlusion, left eye 05/04/2022    Central retinal artery occlusion, left eye    Central retinal artery occlusion, right eye 05/04/2022    Central retinal artery occlusion, right eye    Diarrhea, unspecified 07/31/2020    Acute diarrhea    Endocarditis, valve unspecified 01/18/2019    Endocarditis    Essential (primary) hypertension 12/07/2022    Benign essential hypertension    Glaucoma secondary to other eye disorders, bilateral, stage unspecified 05/04/2022    Neovascular glaucoma of both eyes    Nonrheumatic mitral (valve) insufficiency 04/24/2017    Mitral regurgitation    Other vascular disorders of iris and ciliary body, left eye 05/04/2022    Rubeosis iridis of left eye    Other vascular disorders of iris and ciliary body, right eye 05/04/2022    Rubeosis iridis of right eye    Personal history of other diseases of the circulatory system     History of hypertension    Personal history of other diseases of urinary system     History of chronic kidney disease    Septic arterial embolism (CMS/[facility]) 05/04/2022    Cerebral septic emboli    Septic arterial embolism (CMS/[facility]) 03/28/2017    Cerebral septic emboli    Unspecified color vision deficiencies 03/28/2017    Visual color changes    Unspecified retinal vascular occlusion 05/04/2022    Retinal vascular occlusion of both eyes    Unspecified visual disturbance 03/28/2017    Change in vision    Visual hallucinations 04/24/2017    Formed visual hallucinations       Surgical History  Past Surgical History:   Procedure Laterality Date    CT HEAD ANGIO W AND WO IV CONTRAST  3/28/2017    CT HEAD ANGIO W AND WO IV CONTRAST 3/28/2017 [facility] CLINICAL LEGACY    CT NECK ANGIO W AND WO IV CONTRAST  3/28/2017    CT NECK ANGIO W AND WO IV CONTRAST 3/28/2017 [facility] CLINICAL  LEGACY    MR HEAD ANGIO WO IV CONTRAST  3/28/2018    MR HEAD ANGIO WO IV CONTRAST 3/28/2018 [facility] ANCILLARY LEGACY    OTHER SURGICAL HISTORY  05/27/2022    Pacemaker insertion    OTHER SURGICAL HISTORY  05/22/2017    Craniotomy Tumor Removal - Complete    OTHER SURGICAL HISTORY  07/01/2019    Colonoscopy complete for polypectomy    OTHER SURGICAL HISTORY  07/01/2019    Endoscopy    TONSILLECTOMY  05/15/2014    Tonsillectomy        Social History  She reports that she has never smoked. She has never used smokeless tobacco. She reports that she does not currently use alcohol. She reports that she does not use drugs.    Family History  Family History   Problem Relation Name Age of Onset    Other (adopted child) Mother      Other (adopted child) Father          Allergies  Adalimumab, Metformin, Ciprofloxacin, Gluten protein, Iodinated contrast media, Iodine, Milk containing products (dairy), Shellfish containing products, Statins-hmg-coa reductase inhibitors, Sulfa (sulfonamide antibiotics), Iodides, and Penicillins    Review of Systems   Constitutional:  Negative for activity change, chills, fatigue and fever.   HENT:  Negative for congestion, sinus pressure, sinus pain and sore throat.    Eyes:  Negative for photophobia, pain and visual disturbance.   Respiratory:  Negative for cough, chest tightness, shortness of breath and wheezing.    Cardiovascular:  Positive for leg swelling. Negative for chest pain and palpitations.   Gastrointestinal:  Positive for diarrhea (Occasional). Negative for abdominal distention, abdominal pain, blood in stool, constipation, nausea and vomiting.   Genitourinary:  Negative for difficulty urinating, dysuria, frequency, hematuria and urgency.   Musculoskeletal:  Positive for arthralgias (Bilateral knees). Negative for back pain and myalgias.   Skin:  Positive for rash (Inferior to breasts bilateral) and wound (Right lower extremity).   Neurological:  Negative for dizziness, seizures,  "syncope, light-headedness, numbness and headaches.        Physical Exam  General: Not in acute distress, A&O x 3, alert, cooperative, well-developed  HEENT: Normocephalic, atraumatic, EOMI, moist mucous membranes  Neck: Neck supple, trachea midline, no evidence of trauma  Cardiovascular: RRR, S1 and S2 appreciated, no murmurs rubs gallops appreciated, distal pulses 1+ bilaterally (dorsalis pedis)  Respiratory: Vesicular breath sounds appreciated bilaterally, no adventitious sounds appreciated, no increased work of breathing  GI: Abdomen soft, nondistended, nontender to palpation, bowel sounds present  Extremities: No edema appreciated in lower extremities bilaterally, no cyanosis  RLE: Approximately 8 x 8 cm lesion with multiple areas of exposed subcutaneous tissue, not draining or purulent, surrounded by large area of erythema.  Neuro: A&O X3, no focal deficits, strength and sensation intact bilaterally  Skin: Warm and dry, without lesions or rashes    Last Recorded Vitals  Blood pressure 156/70, pulse 77, temperature 36.7 °C (98.1 °F), temperature source Temporal, resp. rate 26, height 1.473 m (4' 10\"), weight 81.6 kg (179 lb 14.4 oz), SpO2 99 %.    Relevant Results   Scheduled medications  insulin glargine, 7 Units, subcutaneous, Nightly  [START ON 10/18/2023] insulin lispro, 0-5 Units, subcutaneous, TID with meals  nystatin, 1 Application, Topical, BID  [START ON 10/18/2023] piperacillin-tazobactam, 3.375 g, intravenous, q8h      Continuous medications     PRN medications  PRN medications: dextrose 10 % in water (D10W), dextrose, glucagon  Results for orders placed or performed during the hospital encounter of 10/17/23 (from the past 24 hour(s))   CBC   Result Value Ref Range    WBC 9.6 4.4 - 11.3 x10*3/uL    nRBC 0.0 0.0 - 0.0 /100 WBCs    RBC 3.79 (L) 4.00 - 5.20 x10*6/uL    Hemoglobin 11.1 (L) 12.0 - 16.0 g/dL    Hematocrit 36.1 36.0 - 46.0 %    MCV 95 80 - 100 fL    MCH 29.3 26.0 - 34.0 pg    MCHC 30.7 (L) " 32.0 - 36.0 g/dL    RDW 14.0 11.5 - 14.5 %    Platelets 261 150 - 450 x10*3/uL    MPV 10.8 7.5 - 11.5 fL   Comprehensive metabolic panel   Result Value Ref Range    Glucose 134 (H) 74 - 99 mg/dL    Sodium 140 136 - 145 mmol/L    Potassium 4.5 3.5 - 5.3 mmol/L    Chloride 110 (H) 98 - 107 mmol/L    Bicarbonate 21 21 - 32 mmol/L    Anion Gap 14 10 - 20 mmol/L    Urea Nitrogen 35 (H) 6 - 23 mg/dL    Creatinine 1.38 (H) 0.50 - 1.05 mg/dL    eGFR 40 (L) >60 mL/min/1.73m*2    Calcium 9.2 8.6 - 10.3 mg/dL    Albumin 3.5 3.4 - 5.0 g/dL    Alkaline Phosphatase 72 33 - 136 U/L    Total Protein 6.7 6.4 - 8.2 g/dL    AST 22 9 - 39 U/L    Bilirubin, Total 0.3 0.0 - 1.2 mg/dL    ALT 12 7 - 45 U/L   C-reactive protein   Result Value Ref Range    C-Reactive Protein 4.71 (H) <1.00 mg/dL   Sedimentation rate, automated   Result Value Ref Range    Sedimentation Rate 41 (H) 0 - 30 mm/h   Urinalysis with Reflex Microscopic   Result Value Ref Range    Color, Urine Yellow Straw, Yellow    Appearance, Urine Clear Clear    Specific Gravity, Urine 1.018 1.005 - 1.035    pH, Urine 6.0 5.0, 5.5, 6.0, 6.5, 7.0, 7.5, 8.0    Protein, Urine 100 (2+) (N) NEGATIVE mg/dL    Glucose, Urine NEGATIVE NEGATIVE mg/dL    Blood, Urine NEGATIVE NEGATIVE    Ketones, Urine NEGATIVE NEGATIVE mg/dL    Bilirubin, Urine NEGATIVE NEGATIVE    Urobilinogen, Urine <2.0 <2.0 mg/dL    Nitrite, Urine NEGATIVE NEGATIVE    Leukocyte Esterase, Urine SMALL (1+) (A) NEGATIVE   Urinalysis Microscopic Only   Result Value Ref Range    WBC, Urine NONE 1-5, NONE /HPF    RBC, Urine NONE NONE, 1-2, 3-5 /HPF     Assessment/Plan   Principal Problem:    Wound infection  Active Problems:    Essential (primary) hypertension    Candidiasis    Type 2 diabetes mellitus with unspecified complications (CMS/HCC)    Hyperlipidemia, unspecified    Chronic atrial fibrillation (CMS/HCC)    73-year-old female with PMH of A-fib (on Xarelto, history of bifascicular block, CKD 4, history of MV  endocarditis (group B strep), lacunar CVA history of cerebral aneurysm and meningioma presents with RLE infection with concern for cellulitis, currently on IV Zosyn. Podiatry on consult      1.Cellulitis of right lower extremity: Chronic, follows with Dr. Cain   -Wound approximately 8 x 8 cm  -Saw Dr. Cain on 10/17/23 for wound debridement  -Wound and blood cultures ordered, results pending  -Continue IV Zosyn, may de-escalate depending on cultures  -Podiatry consulted, appreciate recs  -Consider ID consult, defer to day team    2.  CKD 4:   -Creatinine 1.38 (baseline 1.5-1.9)  -Avoid all nephrotoxic agents  -Continue to trend RFP  -Patient's creatinine on admission 1.38    3.  IDDM 2:    -Takes 13 units of Lantus at home nightly, will decrease to 7 units while inpatient  -Begin mild ISS  -Hypoglycemia protocol in place    4. HTN:   -Continue home amlodipine 2.5 mg daily  -Continue home furosemide 40 mg daily, if creatinine increases consider holding  -Continue home lisinopril 40 mg daily, if creatinine increases consider holding  -Continue home metoprolol succinate 25 mg daily  -Held home losartan in setting of CKD consider resuming if creatinine's continues to be stable. Was on both Losartan and Lisinopril before admission    5.  A-fib/bifascicular block:   -Continue home Xarelto  -Continue metoprolol succinate 25 mg daily  -Electronically atrially paced    6.  HLD:   -Continue home ezetimibe     7.  GERD:   -Continue home Pepcid    8.  Candida infection:   -Bilaterally inferior to the breast  -Nystatin powder        Diet: Diabetic  DVT prophylaxis: Home Xarelto  Code: DNR-DNI    Dispo: Anticipate 2-3 midnights in the hospital for IV antibiotics    To be staffed with senior resident and Shorty Whitehead MD  Internal Medicine PGY-1    Patient seen with intern physician. The changes above have been made directly to the Emily Villanueva MD  Internal medicine PGY-3

## 2023-10-18 NOTE — HOSPITAL COURSE
Kira Solo is a 73 y.o. female with PMH significant for A-fib (on Xarelto, history of bifascicular block), CKD 4 (baseline CR 1.5-1.9), IDDM-2, HLD, HTN,  history of MV  endocarditis (group B strep), lacunar CVA history of cerebral aneurysm and meningioma.  Patient presented to the ED on 10/17/2023 from podiatrist office at request of podiatrist Dr. Cain.  Patient has chronic right lower extremity wound that has been present for a few weeks.  Patient attended her appointment with Dr. Cain and I&D was completed, but due to the patient's chronic infection and antibiotic allergies he suggested the patient go to the emergency department for antibiotic care.  At admission BP was elevated but she stated she did not take her hypertensive medication for the day.  CRP was elevated but white blood cell count was normal and patient was afebrile.  Zosyn was started in the emergency department and overnight vancomycin was added.  Podiatry was consulted a saline wet-to-dry dressing change was performed along with Dakin's solution.  Per podiatry's assessment wounds were superficial and likely will not require surgical debridement.  Prior wound cultures showed Pseudomonas with sensitivity to ciprofloxacin and Enterococcus faecalis susceptible to penicillin.  Allergies were reviewed with patient and shivers were reported with ciprofloxacin on 1 occasion, the clinical decision was made to give her the medication while inpatient and she reported no reactions.  Penicillin allergy was reported with a rash however patient tolerated Zosyn during hospitalization.  On physical exam patient had significant itching and erythema with satellite lesions was seen on the folds of her abdomen and under her breasts patient was given fluconazole overnight and given nystatin powder while inpatient and at discharge.  Since oral medication was available and podiatry did not suggest any intervention patient was deemed suitable for  discharge.    She is encouraged to follow-up with her podiatrist and her primary care physician.

## 2023-10-18 NOTE — CARE PLAN
Problem: Pain  Goal: My pain/discomfort is manageable  Outcome: Progressing     Problem: Safety  Goal: Patient will be injury free during hospitalization  Outcome: Progressing  Goal: I will remain free of falls  Outcome: Progressing     Problem: Daily Care  Goal: Daily care needs are met  Outcome: Progressing     Problem: Psychosocial Needs  Goal: Demonstrates ability to cope with hospitalization/illness  Outcome: Progressing  Goal: Collaborate with me, my family, and caregiver to identify my specific goals  Outcome: Progressing  Flowsheets (Taken 10/17/2023 2480)  Cultural Requests During Hospitalization: NONE  Spiritual Requests During Hospitalization: NONE     Problem: Discharge Barriers  Goal: My discharge needs are met  Outcome: Progressing   The patient's goals for the shift include GETTING THRU IT    The clinical goals for the shift include WOUND CARE

## 2023-10-18 NOTE — CONSULTS
Consults   Reason For Consult  RLE wound    History Of Present Illness  Kira Solo is a 73 y.o. female presenting with RLE cellulitis secondary to venous wound.  Patient regular seen by Dr. Cain [facility], however she wanted to report to [facility] for treatment.  Patient states the wound has been present for about 3 weeks, and saw Dr. Cain yesterday, who noticed increased drainage and erythema.  Patient was instructed to report to the ED for admission and IV antibiotics secondary to multiple antibiotic allergies.  Patient also treated by Dr. Pearl, who is a vascular surgeon/primary care physician.  Patient denies any constitutional symptoms at this time, and has no other complaints.     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    Surgical History  She has a past surgical history that includes Tonsillectomy (05/15/2014); Other surgical  history (05/27/2022); Other surgical history (05/22/2017); Other surgical history (07/01/2019); Other surgical history (07/01/2019); MR angio head wo IV contrast (3/28/2018); CT angio head w and wo IV contrast (3/28/2017); and CT angio neck w and wo IV contrast (3/28/2017).     Social History  She reports that she has never smoked. She has never used smokeless tobacco. She reports that she does not currently use alcohol. She reports that she does not use drugs.    Family History  Family History   Problem Relation Name Age of Onset    Other (adopted child) Mother      Other (adopted child) Father          Allergies  Adalimumab, Metformin, Ciprofloxacin, Gluten protein, Iodinated contrast media, Iodine, Milk containing products (dairy), Shellfish containing products, Statins-hmg-coa reductase inhibitors, Sulfa (sulfonamide antibiotics), Iodides, and Penicillins    Review of Systems  Review of Systems      Physical Exam  Vascular: DP and PT pulses palpable 1/4 bilaterally.  CFT under 3 seconds when tested at distal digits.  Skin temp warm to warn from proximal to distal.      Neuro: Protective sensation intact 3 warm  intacta, light tough sensation intact.  No Tinel's or Valleix's signs elicited.      Derm: Full thickness venous ulceration noted circumferentially around right lower leg; mostly superficial with areas of exposed subcutaneous tissue.  Wounds are mostly fibrotic with loosely adhered slough.  Moderate periwound erythema noted, no purulence of fluctuance noted.  No subcutaneous nodules.  Interdigital spaces are CDI.    Musc: No gross deformities noted.  No POP noted to any other area of the foot/ankle.     Medications  Scheduled medications  allopurinol, 100 mg, oral, Daily  amLODIPine, 2.5 mg, oral, Daily  aspirin, 81 mg, oral, Daily  ezetimibe, 10 mg, oral, Daily  famotidine, 40 mg, oral, Daily  [START ON 10/19/2023] fluconazole, 100 mg, intravenous, q24h  furosemide, 40 mg, oral, Daily  insulin glargine,  "7 Units, subcutaneous, Nightly  insulin lispro, 0-5 Units, subcutaneous, TID with meals  latanoprost, 1 drop, Both Eyes, Daily  lisinopril, 40 mg, oral, Daily  metoprolol succinate XL, 25 mg, oral, Daily  nystatin, 1 Application, Topical, BID  piperacillin-tazobactam, 3.375 g, intravenous, q8h  polyethylene glycol, 17 g, oral, Daily  rivaroxaban, 15 mg, oral, Daily      Continuous medications     PRN medications  PRN medications: dextrose 10 % in water (D10W), dextrose, glucagon     Last Recorded Vitals  Blood pressure 142/62, pulse 72, temperature 36.4 °C (97.5 °F), temperature source Temporal, resp. rate 18, height 1.473 m (4' 10\"), weight 81.6 kg (179 lb 14.4 oz), SpO2 98 %.    Relevant Results  Results for orders placed or performed during the hospital encounter of 10/17/23 (from the past 24 hour(s))   CBC   Result Value Ref Range    WBC 9.6 4.4 - 11.3 x10*3/uL    nRBC 0.0 0.0 - 0.0 /100 WBCs    RBC 3.79 (L) 4.00 - 5.20 x10*6/uL    Hemoglobin 11.1 (L) 12.0 - 16.0 g/dL    Hematocrit 36.1 36.0 - 46.0 %    MCV 95 80 - 100 fL    MCH 29.3 26.0 - 34.0 pg    MCHC 30.7 (L) 32.0 - 36.0 g/dL    RDW 14.0 11.5 - 14.5 %    Platelets 261 150 - 450 x10*3/uL    MPV 10.8 7.5 - 11.5 fL   Comprehensive metabolic panel   Result Value Ref Range    Glucose 134 (H) 74 - 99 mg/dL    Sodium 140 136 - 145 mmol/L    Potassium 4.5 3.5 - 5.3 mmol/L    Chloride 110 (H) 98 - 107 mmol/L    Bicarbonate 21 21 - 32 mmol/L    Anion Gap 14 10 - 20 mmol/L    Urea Nitrogen 35 (H) 6 - 23 mg/dL    Creatinine 1.38 (H) 0.50 - 1.05 mg/dL    eGFR 40 (L) >60 mL/min/1.73m*2    Calcium 9.2 8.6 - 10.3 mg/dL    Albumin 3.5 3.4 - 5.0 g/dL    Alkaline Phosphatase 72 33 - 136 U/L    Total Protein 6.7 6.4 - 8.2 g/dL    AST 22 9 - 39 U/L    Bilirubin, Total 0.3 0.0 - 1.2 mg/dL    ALT 12 7 - 45 U/L   C-reactive protein   Result Value Ref Range    C-Reactive Protein 4.71 (H) <1.00 mg/dL   Sedimentation rate, automated   Result Value Ref Range    Sedimentation Rate 41 " (H) 0 - 30 mm/h   Urinalysis with Reflex Microscopic   Result Value Ref Range    Color, Urine Yellow Straw, Yellow    Appearance, Urine Clear Clear    Specific Gravity, Urine 1.018 1.005 - 1.035    pH, Urine 6.0 5.0, 5.5, 6.0, 6.5, 7.0, 7.5, 8.0    Protein, Urine 100 (2+) (N) NEGATIVE mg/dL    Glucose, Urine NEGATIVE NEGATIVE mg/dL    Blood, Urine NEGATIVE NEGATIVE    Ketones, Urine NEGATIVE NEGATIVE mg/dL    Bilirubin, Urine NEGATIVE NEGATIVE    Urobilinogen, Urine <2.0 <2.0 mg/dL    Nitrite, Urine NEGATIVE NEGATIVE    Leukocyte Esterase, Urine SMALL (1+) (A) NEGATIVE   Urinalysis Microscopic Only   Result Value Ref Range    WBC, Urine NONE 1-5, NONE /HPF    RBC, Urine NONE NONE, 1-2, 3-5 /HPF   POCT GLUCOSE   Result Value Ref Range    POCT Glucose 93 74 - 99 mg/dL   POCT GLUCOSE   Result Value Ref Range    POCT Glucose 87 74 - 99 mg/dL      No results found.      Assessment/Plan     Assessment:  - Non-pressure ulcer, right leg  - PVD  - DM-II complicated by peripheral neuropathy      Plan:  - Pt examined and evaluated.  All findings discussed to patient to their satisfaction and understanding.  - Reviewed labs and chart.  - Systemic conditions managed by primary team.  - Performed saline wet to dry dressing change.  Dressings to be changed daily by podiatry.  - Ordered Dakins solution for dressing changes.  - Wounds are mostly superficial, and will likely not require surgical debridement.  Low suspicion of abscess.  - Will continue to follow inpatient.  Please contact podiatry with any acute questions/concerns.    - Thank you for the consult.     Petar Ojeda DPM  Podiatric Surgery  Doc Halo/Ismael REDDING spent >30 minutes in the professional and overall care of this patient.      Petar Ojeda DPM

## 2023-10-18 NOTE — DOCUMENTATION CLARIFICATION NOTE
PATIENT:               FAVIOLA CAT  ACCT #:                  [id number]  MRN:                       [id number]  :                       1950  ADMIT DATE:       10/17/2023 2:18 PM  DISCH DATE:  RESPONDING PROVIDER #:        [id number]          PROVIDER RESPONSE TEXT:    Cellulitis/poor wound healing is related to Diabetes    CDI QUERY TEXT:    [facility]_DM Manifestation    Instruction:    Based on your assessment of the patient and the clinical information, please provide the requested documentation by clicking on the appropriate radio button and enter any additional information if prompted.    Question: Please further clarify the relationship between DM and Cellulitis/poor wound healing    When answering this query, please exercise your independent professional judgment. The fact that a question is being asked, does not imply that any particular answer is desired or expected.    The patient's clinical indicators include:  Clinical Information:  73F presents for RLE ulcer/cellulitis    Clinical Indicators:  - HA1C, 10/18: 6.9  - ED, 10/17, Neftali: presenting for RLE ulcer.  She has been seen for her ulcer by podiatry sent of IV abx treatment  - H&P, 10/17, Kay/Gilson: admitted with right lower extremity wound with periwound cellulitis.  Low suspicion for any deeper infection or abscess.  - Pod, 10/18, Rusty: presenting with RLE cellulitis secondary to venous wound...Non-pressure ulcer, right leg; PVD; DM-II complicated by peripheral neuropathy    Treatment:  IV zosyn, insulin, Lantus    Risk Factors:  DM2  Options provided:  -- Cellulitis/poor wound healing is related to Diabetes  -- Cellulitis/poor wound healing is unrelated to Diabetes  -- Other - I will add my own diagnosis  -- Refer to Clinical Documentation Reviewer    Query created by: Josee Dacosta on 10/18/2023 3:23 PM      Electronically signed by:  MEGHAN THOMSON MD 10/18/2023 3:41 PM

## 2023-10-18 NOTE — PROGRESS NOTES
Medication Adjustment    The following medication(s) was/were adjusted for Kira Solo per protocol/policy due to altered renal function.    Medication(s) adjusted:   Famotidine 40 mg daily decreased to 20 mg daily for CrCl 30     Alexander Juarez, PharmD

## 2023-10-18 NOTE — CARE PLAN
The patient's goals for the shift include GETTING THRU IT    The clinical goals for the shift include WOUND CARE  CONSULT PLACED FOR WOUND CARE RN. PICTURE TAKEN WITH MEASUREMENTS. MEPITEL 1 PLACED OVER WOUND X6 WRAPPED WITH KERLIX .   Problem: Skin  Goal: Decreased wound size/increased tissue granulation at next dressing change  Outcome: Progressing  Goal: Participates in plan/prevention/treatment measures  Outcome: Progressing  Goal: Prevent/manage excess moisture  Outcome: Progressing  Goal: Prevent/minimize sheer/friction injuries  Outcome: Progressing  Goal: Promote/optimize nutrition  Outcome: Progressing  Goal: Promote skin healing  Outcome: Progressing     Problem: Fall/Injury  Goal: Not fall by end of shift  Outcome: Progressing  Goal: Be free from injury by end of the shift  Outcome: Progressing  Goal: Verbalize understanding of personal risk factors for fall in the hospital  Outcome: Progressing  Goal: Verbalize understanding of risk factor reduction measures to prevent injury from fall in the home  Outcome: Progressing  Goal: Use assistive devices by end of the shift  Outcome: Progressing  Goal: Pace activities to prevent fatigue by end of the shift  Outcome: Progressing

## 2023-10-18 NOTE — DISCHARGE SUMMARY
Discharge Diagnosis  Wound infection    Issues Requiring Follow-Up  Please follow-up with your podiatrist, Dr. Cain, regarding your admission and post procedure follow-up.  Patient received 24 hours of Vanco and Zosyn and was discharged with ciprofloxacin and amoxicillin.    Please follow-up with your primary care physician for a posthospital visit.    Discharge Meds     Your medication list        START taking these medications        Instructions Last Dose Given Next Dose Due   amoxicillin 500 mg capsule  Commonly known as: Amoxil      Take 1 capsule (500 mg) by mouth every 8 hours for 7 days.       ciprofloxacin 750 mg tablet  Commonly known as: Cipro      Take 1 tablet (750 mg) by mouth every 12 hours for 7 days.       nystatin 100,000 unit/gram powder  Commonly known as: Mycostatin      Apply 1 Application topically if needed for itching.              CHANGE how you take these medications        Instructions Last Dose Given Next Dose Due   gentamicin 0.1 % cream  Commonly known as: Garamycin  What changed:   how much to take  when to take this  reasons to take this      Apply topically once daily for 10 days. Apply to right lower leg ulcer daily and dressing changes ordered.              CONTINUE taking these medications        Instructions Last Dose Given Next Dose Due   allopurinol 100 mg tablet  Commonly known as: Zyloprim      Take 1 tablet (100 mg) by mouth once daily.       amLODIPine 5 mg tablet  Commonly known as: Norvasc           aspirin 81 mg EC tablet           BIOTIN FORTE ORAL           brimonidine 0.2 % ophthalmic solution  Commonly known as: AlphaGAN           cholecalciferol 25 MCG (1000 UT) tablet  Commonly known as: Vitamin D-3           coenzyme Q-10 10 mg capsule           ezetimibe 10 mg tablet  Commonly known as: Zetia           famotidine 40 mg tablet  Commonly known as: Pepcid           ferrous sulfate 325 (65 Fe) MG tablet           fish oil concentrate 120-180 mg capsule  Commonly  known as: Omega-3           ketoconazole 2 % cream  Commonly known as: NIZOral           latanoprost 0.005 % ophthalmic solution  Commonly known as: Xalatan      Administer 1 drop into both eyes once daily.       lisinopril 40 mg tablet           losartan 25 mg tablet  Commonly known as: Cozaar           metoprolol succinate XL 25 mg 24 hr tablet  Commonly known as: Toprol-XL           Multiple Vitamins tablet  Generic drug: multivitamin           red yeast rice 600 mg tablet           Semglee(insulin glarg-yfgn)Pen 100 unit/mL (3 mL) Pen  Generic drug: insulin glargine-yfgn           triamcinolone 0.1 % cream  Commonly known as: Kenalog           VITAMIN C ORAL           Xarelto 15 mg tablet  Generic drug: rivaroxaban                  ASK your doctor about these medications        Instructions Last Dose Given Next Dose Due   furosemide 20 mg tablet  Commonly known as: Lasix      Take 1 tablet (20 mg) by mouth once daily as needed (swelling).       ketoconazole 2 % shampoo  Commonly known as: NIZOral      Apply topically 3 times a week.                 Where to Get Your Medications        These medications were sent to [id number] - [place], [place] - [address] [address] AT [facility] [address]  [address] [address], [facility] [address]      Phone: [phone]   amoxicillin 500 mg capsule  ciprofloxacin 750 mg tablet  nystatin 100,000 unit/gram powder         Test Results Pending At Discharge  Pending Labs       Order Current Status    Blood Culture Preliminary result    Blood Culture Preliminary result            Hospital Course  Kira Solo is a 73 y.o. female with PMH significant for A-fib (on Xarelto, history of bifascicular block), CKD 4 (baseline CR 1.5-1.9), IDDM-2, HLD, HTN,  history of MV  endocarditis (group B strep), lacunar CVA history of cerebral aneurysm and meningioma.  Patient presented to the ED on 10/17/2023 from podiatrist office at request of podiatrist  Dr. Cain.  Patient has chronic right lower extremity wound that has been present for a few weeks.  Patient attended her appointment with Dr. Cain and I&D was completed, but due to the patient's chronic infection and antibiotic allergies he suggested the patient go to the emergency department for antibiotic care.  At admission BP was elevated but she stated she did not take her hypertensive medication for the day.  CRP was elevated but white blood cell count was normal and patient was afebrile.  Zosyn was started in the emergency department and overnight vancomycin was added.  Podiatry was consulted a saline wet-to-dry dressing change was performed along with Dakin's solution.  Per podiatry's assessment wounds were superficial and likely will not require surgical debridement.  Prior wound cultures showed Pseudomonas with sensitivity to ciprofloxacin and Enterococcus faecalis susceptible to penicillin.  Allergies were reviewed with patient and shivers were reported with ciprofloxacin on 1 occasion, the clinical decision was made to give her the medication while inpatient and she reported no reactions.  Penicillin allergy was reported with a rash however patient tolerated Zosyn during hospitalization.  On physical exam patient had significant itching and erythema with satellite lesions was seen on the folds of her abdomen and under her breasts patient was given fluconazole overnight and given nystatin powder while inpatient and at discharge.  Since oral medication was available and podiatry did not suggest any intervention patient was deemed suitable for discharge.    She is encouraged to follow-up with her podiatrist and her primary care physician.    Pertinent Physical Exam At Time of Discharge  Physical Exam  Constitutional:       Appearance: She is obese.   HENT:      Mouth/Throat:      Mouth: Mucous membranes are moist.   Cardiovascular:      Rate and Rhythm: Normal rate and regular rhythm.      Heart sounds:  Normal heart sounds.   Pulmonary:      Effort: Pulmonary effort is normal.      Breath sounds: Normal breath sounds.   Abdominal:      General: Abdomen is flat. Bowel sounds are normal.      Palpations: Abdomen is soft.      Comments: Erythema with satellite lesions seen in folds of abdomen and under breasts.  Residual cream seen as well.  Patient was scratching during exam.   Musculoskeletal:      Comments: Both legs were wrapped in clean dry dressing, nontender   Skin:     Findings: Rash (in abdominal and breast folds) present.   Neurological:      General: No focal deficit present.      Mental Status: She is alert.   Psychiatric:         Mood and Affect: Mood normal.         Behavior: Behavior normal.         Thought Content: Thought content normal.         Judgment: Judgment normal.         Outpatient Follow-Up  Future Appointments   Date Time Provider Department Center   10/23/2023  1:30 PM Stephania Warner MD [id number] [place]   10/31/2023  9:30 AM Roverto JOSELINE Cain DPOBDULIA [id number] [place]   11/22/2023  9:45 AM PAR OPCTR PET CT PAROPCPETMOB [facility]   11/22/2023 10:15 AM Roverto JOSELINE Cain DPOBDULIA [id number] [place]   11/22/2023 10:45 AM Andre Heredia MD [id number] [place]   12/13/2023 11:20 AM Trip Cruz MD [id number] [place]   12/15/2023  2:00 PM Karis Wheat MD [id number] [place]   12/15/2023  3:40 PM Luna Porras MD [id number] [place]   1/8/2024 11:15 AM Tanja Pascual MD [id number] [place]   7/30/2024  1:00 PM [facility] [id number] [place]   7/30/2024  1:20 PM Artemio Sierra MD [id number] [place]         Lin Downs DO

## 2023-10-19 NOTE — TELEPHONE ENCOUNTER
Pts  is calling to see if pt needs to have blood work done for her appt on Monday.    Please advise.

## 2023-10-20 NOTE — TELEPHONE ENCOUNTER
Pts  notified. Verbal understanding.     He states that pt is having a reaction to one or both of the antibiotics she was given at the hospital.    Since she had the rash before she went it they said ton contact PCP.    Pt then got on the phone and states that the rash she had before is still there but does not itch.    She has a new rash that is on her neck now that does itch.    Please advise.

## 2023-10-20 NOTE — PROGRESS NOTES
Discharge Facility:  [facility]   Discharge Diagnosis:  Wound infection  Admission Date:  10/17/23   Discharge Date:   10/18/23     PCP Appointment Date:  10/023/23   Specialist Appointment Date:   Hospital Encounter and Summary: Linked   See discharge assessment below for further details    Script for Augmentin, Cipro, and nystatin     Engagement  Call Start Time: 1020 (10/20/2023 11:23 AM)    Medications  Medications reviewed with patient/caregiver?: Yes (10/20/2023 11:23 AM)  Is the patient having any side effects they believe may be caused by any medication additions or changes?: (!) Yes (rash on neck- pt to call pcp) (10/20/2023 11:23 AM)  Does the patient have all medications ordered at discharge?: Yes (10/20/2023 11:23 AM)  Prescription Comments: script for augmentin, cipro and nystatin (10/20/2023 11:23 AM)  Is the patient taking all medications as directed (includes completed medication regime)?: Yes (10/20/2023 11:23 AM)    Appointments  Does the patient have a primary care provider?: Yes (10/20/2023 11:23 AM)  Care Management Interventions: Verified appointment date/time/provider (10/20/2023 11:23 AM)  Has the patient kept scheduled appointments due by today?: Yes (10/20/2023 11:23 AM)    Patient Teaching  Does the patient have access to their discharge instructions?: Yes (10/20/2023 11:23 AM)  Care Management Interventions: Reviewed instructions with patient (10/20/2023 11:23 AM)  What is the patient's perception of their health status since discharge?: Improving (10/20/2023 11:23 AM)  Is the patient/caregiver able to teach back the hierarchy of who to call/visit for symptoms/problems? PCP, Specialist, Home Health nurse, Urgent Care, ED, 911: Yes (10/20/2023 11:23 AM)  Patient/Caregiver Education Comments: This CM spoke with pt via phone. Pt reports doing well at home since discharge. New meds reviewed. pt states she noticied a rd rash on neck that is itchy. pt states she has allergy to  cipro in the past. pt also states she had sulema on other parts of her body prior to admission.  cm to notify office and pt to call pcp office as ishaanel. Pt denies CP and SOB. Pt aware of my availability for non emergent concerns. Contact info provided to patient (10/20/2023 11:23 AM)

## 2023-10-21 PROBLEM — N17.9 ACUTE RENAL FAILURE, UNSPECIFIED ACUTE RENAL FAILURE TYPE (CMS-HCC): Status: ACTIVE | Noted: 2023-01-01

## 2023-10-21 PROBLEM — B37.9 CANDIDIASIS: Chronic | Status: ACTIVE | Noted: 2023-01-01

## 2023-10-21 PROBLEM — I87.2 VENOUS INSUFFICIENCY (CHRONIC) (PERIPHERAL): Chronic | Status: ACTIVE | Noted: 2017-03-31

## 2023-10-21 PROBLEM — N18.30 STAGE 3 CHRONIC KIDNEY DISEASE (MULTI): Chronic | Status: ACTIVE | Noted: 2017-04-01

## 2023-10-21 NOTE — Clinical Note
ED UM Review Complete - Patient Meets Inpatient Criteria  [email or web address]

## 2023-10-21 NOTE — ED PROVIDER NOTES
HPI   Chief Complaint   Patient presents with    Rash    Weakness, Gen    Diarrhea       Patient is a 73-year-old female with hypertension, diabetes, endocarditis 2019 who presents to the emergency department for generalized weakness and diarrhea.  Patient was discharged from the hospital 4 days ago after being admitted for right lower extremity cellulitis.  She was discharged home with ciprofloxacin and amoxicillin.  Soon after going home, she developed a full body rash that spares the face.  She called her primary care doctor, who instructed her to stop taking amoxicillin as it may be an allergic reaction, but to continue the ciprofloxacin.  She prescribed some steroids which the patient took a dose of today.  She has been having diarrhea as well, going many times a day.  She is not sure if it has been black or bloody.  Today, she was getting out of her bed to go to the bathroom and she slipped.  She slipped to the floor and ended up partially still on the bed.  Prior to this slip, she did not have any chest pain, shortness of breath, dizziness, palpitations.  She called her daughter and  to help her but they were unable to lift her up.  They called EMS for left assist, but when they arrived, they felt that she was too weak to be left at home and her family is unable to care for her so they brought her to the emergency department.      History provided by:  EMS personnel, patient and medical records                      No data recorded                Patient History   Past Medical History:   Diagnosis Date    Anemia, unspecified 10/09/2020    Acute anemia    Benign neoplasm of brain, unspecified (CMS/formerly [facility]) 04/24/2017    Benign brain tumor    Candidiasis of skin and nail 06/09/2017    Candidiasis, cutaneous    Central retinal artery occlusion, left eye 05/04/2022    Central retinal artery occlusion, left eye    Central retinal artery occlusion, right eye 05/04/2022    Central retinal artery occlusion, right  eye    Diarrhea, unspecified 07/31/2020    Acute diarrhea    Endocarditis, valve unspecified 01/18/2019    Endocarditis    Essential (primary) hypertension 12/07/2022    Benign essential hypertension    Glaucoma secondary to other eye disorders, bilateral, stage unspecified 05/04/2022    Neovascular glaucoma of both eyes    Nonrheumatic mitral (valve) insufficiency 04/24/2017    Mitral regurgitation    Other vascular disorders of iris and ciliary body, left eye 05/04/2022    Rubeosis iridis of left eye    Other vascular disorders of iris and ciliary body, right eye 05/04/2022    Rubeosis iridis of right eye    Personal history of other diseases of the circulatory system     History of hypertension    Personal history of other diseases of urinary system     History of chronic kidney disease    Septic arterial embolism (CMS/HCC) 05/04/2022    Cerebral septic emboli    Septic arterial embolism (CMS/HCC) 03/28/2017    Cerebral septic emboli    Unspecified color vision deficiencies 03/28/2017    Visual color changes    Unspecified retinal vascular occlusion 05/04/2022    Retinal vascular occlusion of both eyes    Unspecified visual disturbance 03/28/2017    Change in vision    Visual hallucinations 04/24/2017    Formed visual hallucinations     Past Surgical History:   Procedure Laterality Date    CT HEAD ANGIO W AND WO IV CONTRAST  3/28/2017    CT HEAD ANGIO W AND WO IV CONTRAST 3/28/2017 [facility] CLINICAL LEGACY    CT NECK ANGIO W AND WO IV CONTRAST  3/28/2017    CT NECK ANGIO W AND WO IV CONTRAST 3/28/2017 [facility] CLINICAL LEGACY    MR HEAD ANGIO WO IV CONTRAST  3/28/2018    MR HEAD ANGIO WO IV CONTRAST 3/28/2018 [facility] ANCILLARY LEGACY    OTHER SURGICAL HISTORY  05/27/2022    Pacemaker insertion    OTHER SURGICAL HISTORY  05/22/2017    Craniotomy Tumor Removal - Complete    OTHER SURGICAL HISTORY  07/01/2019    Colonoscopy complete for polypectomy    OTHER SURGICAL HISTORY  07/01/2019    Endoscopy    TONSILLECTOMY  05/15/2014     Tonsillectomy     Family History   Problem Relation Name Age of Onset    Other (adopted child) Mother      Other (adopted child) Father       Social History     Tobacco Use    Smoking status: Never    Smokeless tobacco: Never   Vaping Use    Vaping Use: Never used   Substance Use Topics    Alcohol use: Not Currently    Drug use: Never       Physical Exam   ED Triage Vitals [10/21/23 1707]   Temp Heart Rate Resp BP   37 °C (98.6 °F) 98 20 91/74      SpO2 Temp Source Heart Rate Source Patient Position   95 % Oral -- Sitting      BP Location FiO2 (%)     -- --       Physical Exam  Vitals and nursing note reviewed.   Constitutional:       General: She is not in acute distress.     Appearance: She is well-developed.   HENT:      Head: Normocephalic and atraumatic.      Right Ear: External ear normal.      Left Ear: External ear normal.      Nose: Nose normal.      Mouth/Throat:      Mouth: Mucous membranes are moist.      Comments: No intraoral lesions, sores, blisters, peeling.  Eyes:      General: No scleral icterus.     Extraocular Movements: Extraocular movements intact.      Conjunctiva/sclera: Conjunctivae normal.      Pupils: Pupils are equal, round, and reactive to light.   Cardiovascular:      Rate and Rhythm: Normal rate and regular rhythm.      Heart sounds: No murmur heard.  Pulmonary:      Effort: Pulmonary effort is normal. No respiratory distress.      Breath sounds: Normal breath sounds.   Abdominal:      Palpations: Abdomen is soft.      Tenderness: There is no abdominal tenderness.   Musculoskeletal:         General: No swelling.      Cervical back: Neck supple.   Skin:     General: Skin is warm and dry.      Capillary Refill: Capillary refill takes less than 2 seconds.      Findings: Erythema and rash present.      Comments: Full body rash that spares the face.  Rashes erythematous, flat, blanches, nontender, pruritic.  There are areas where the epidermal layer has been peeling, but there are no  bullae, petechiae, or ecchymoses.   Neurological:      General: No focal deficit present.      Mental Status: She is alert.   Psychiatric:         Mood and Affect: Mood normal.         ED Course & MDM   Diagnoses as of 10/24/23 0214   Acute renal failure, unspecified acute renal failure type (CMS/[facility])   C. difficile diarrhea   Cellulitis of right lower extremity   Leukocytosis, unspecified type   Allergic reaction, initial encounter   Rash       Medical Decision Making  Patient is a 73-year-old female who presents Emergency Department for diarrhea.  She is noted to be slightly hypotensive and 91/74 on arrival, but not tachycardic or febrile.  She appears fatigue but not toxic.  Abdomen soft and nontender.  Patient has been on antibiotics recently and is now having a significant amount of diarrhea.  C. difficile is suspected.  Stool studies are ordered.  Patient does have a soft blood pressure with a heart rate of 98 and known recent infection.  Her leg wounds on chart review grew Pseudomonas.  Septic workup was initiated and patient is given 30 mL/kg of IV fluids.  However, on examination of the lower extremity wound, it does not appear infected.  Suspect C. difficile, we will wait for lab work to return before starting antibiotics.  Her rash is erythematous, but blanches and does not have any bullae, petechiae, or ecchymosis.  It is pruritic.  Suspect allergic reaction rather than SJS or TEN.    Results for orders placed or performed during the hospital encounter of 10/21/23  -CBC and Auto Differential:        Result                      Value             Ref Range           WBC                         32.4 (H)          4.4 - 11.3 x*       nRBC                        0.0               0.0 - 0.0 /1*       RBC                         3.91 (L)          4.00 - 5.20 *       Hemoglobin                  11.5 (L)          12.0 - 16.0 *       Hematocrit                  35.9 (L)          36.0 - 46.0 %       MCV                          92                80 - 100 fL         MCH                         29.4              26.0 - 34.0 *       MCHC                        32.0              32.0 - 36.0 *       RDW                         14.3              11.5 - 14.5 %       Platelets                   325               150 - 450 x1*       MPV                         10.7              7.5 - 11.5 fL       Neutrophils %               85.0              40.0 - 80.0 %       Immature Granulocytes *     2.2 (H)           0.0 - 0.9 %         Lymphocytes %               6.3               13.0 - 44.0 %       Monocytes %                 4.4               2.0 - 10.0 %        Eosinophils %               1.8               0.0 - 6.0 %         Basophils %                 0.3               0.0 - 2.0 %         Neutrophils Absolute        27.57 (H)         1.60 - 5.50 *       Immature Granulocytes *     0.70 (H)          0.00 - 0.50 *       Lymphocytes Absolute        2.05              0.80 - 3.00 *       Monocytes Absolute          1.43 (H)          0.05 - 0.80 *       Eosinophils Absolute        0.58 (H)          0.00 - 0.40 *       Basophils Absolute          0.11 (H)          0.00 - 0.10 *  -Comprehensive Metabolic Panel:        Result                      Value             Ref Range           Glucose                     179 (H)           74 - 99 mg/dL       Sodium                      136               136 - 145 mm*       Potassium                   5.0               3.5 - 5.3 mm*       Chloride                    104               98 - 107 mmo*       Bicarbonate                 21                21 - 32 mmol*       Anion Gap                   16                10 - 20 mmol*       Urea Nitrogen               57 (H)            6 - 23 mg/dL        Creatinine                  5.32 (H)          0.50 - 1.05 *       eGFR                        8 (L)             >60 mL/min/1*       Calcium                     9.0               8.6 - 10.3 m*       Albumin                      3.1 (L)           3.4 - 5.0 g/*       Alkaline Phosphatase        122               33 - 136 U/L        Total Protein               6.2 (L)           6.4 - 8.2 g/*       AST                         31                9 - 39 U/L          Bilirubin, Total            0.4               0.0 - 1.2 mg*       ALT                         21                7 - 45 U/L     -Lactate:        Result                      Value             Ref Range           Lactate                     1.9               0.4 - 2.0 mm*  -Troponin I, High Sensitivity:        Result                      Value             Ref Range           Troponin I, High Sensi*     66 (HH)           0 - 13 ng/L    -Troponin I, High Sensitivity:        Result                      Value             Ref Range           Troponin I, High Sensi*     55 (HH)           0 - 13 ng/L       Patient has a significant a chaotic, with a creatinine of 5.  It was previously 1.3.  Her leukocytosis of 34.2 along with the diarrhea is suspicious for C. difficile infection.  She is given vancomycin 250 mg orally for treatment.  She is given another liter of IV fluids.  Her heart rate is improving.  Her troponin was elevated 66, but down trending to 55.  Findings were discussed with the patient.  Patient will be admitted.  She is agreeable with this plan.    Lazaro High DO, PGY 3  Emergency Medicine Resident        Amount and/or Complexity of Data Reviewed  External Data Reviewed: labs and notes.  Labs: ordered.  ECG/medicine tests: ordered and independent interpretation performed.        Procedure  Procedures     Lazaro High DO  Resident  10/21/23 0557    The patient was seen by the resident/fellow.  I have personally performed a substantive portion of the encounter.  I have seen and examined the patient; agree with the workup, evaluation, MDM, management and diagnosis.  The care plan has been discussed with the resident/fellow; I have reviewed the resident/fellow’s note and  agree with the documented findings with the exception/addition of the following:    The patient requires admission to the hospital for acute renal failure given the copious amounts of diarrhea that she is having, which is suspicious for C. difficile especially given the elevated white count.  The rash on the patient's body appears to have started after starting the oral antibiotics, and may be secondary to hypersensitivity reaction or allergic reaction as she has had a rash with penicillin in the past and is also allergic to Cipro.  The diarrhea will be treated as it is highly concerning for C. difficile with vancomycin p.o.  The patient be admitted to the hospital for IV antibiotics, for further evaluation and management.       Joseph Linares, DO  10/24/23 0219

## 2023-10-21 NOTE — PROGRESS NOTES
Subjective   Patient ID: Kira Solo is a 73 y.o. female who presents for Rash.    Recently treated for wound infection to leg  Started 2 antibiotics (amoxicillin and Cipro)on Wednesday, developed rash on Thursday, burns, feels like a sunburn  Doctor who prescribed the antibiotic said they wanted to try Amoxicillin despite listed allergy.  Patient has known allergy to PCN  She has adverse reaction listed to Cipro as nausea/vomiting  PCP follow up scheduled on Monday   present and assists with HPI    Rash  This is a new problem. The current episode started in the past 7 days. The rash is diffuse. The rash is characterized by peeling and burning. Past treatments include nothing. Her past medical history is significant for allergies.        Review of Systems   Skin:  Positive for rash.       Objective   There were no vitals taken for this visit.    Physical Exam  Constitutional:       General: She is not in acute distress.     Appearance: She is obese. She is not ill-appearing.   Pulmonary:      Effort: Pulmonary effort is normal.   Skin:     Findings: Rash present.      Comments: Diffuse rash with erythem and peeling seen on video   Neurological:      Mental Status: She is alert and oriented to person, place, and time.         Assessment/Plan   Diagnoses and all orders for this visit:  Allergic reaction, initial encounter  -     predniSONE (Deltasone) 20 mg tablet; Take 3 tablets (60 mg) by mouth once daily for 3 days, THEN 2 tablets (40 mg) once daily for 3 days, THEN 1 tablet (20 mg) once daily for 3 days.  -     cetirizine (ZyrTEC) 10 mg tablet; Take 1 tablet (10 mg) by mouth once daily.  Advised to STOP Amoxicillin, may continue on Cipro for now, instructed to contact prescribing physician and notify regarding stopping antibiotic  Follow up with PCP on Monday  Monitor blood glucase closely while on steroid, risk of increasing blood sugar levels.    If any difficulty swallowing, talking, or breathing  seek emergency care immediately

## 2023-10-22 NOTE — H&P
"History Of Present Illness  Kira Solo is a 73 y.o. female with PMH significant for A-fib (on Xarelto, history of bifascicular block), CKD 4 (baseline CR 1.5-1.9), IDDM-2, HLD, HTN,  history of MV  endocarditis (group B strep), lacunar CVA history of cerebral aneurysm and meningioma.  Patient presented to the ED on 10/22/2023 for increasing bouts of diarrhea along with diffuse rash.  Patient states that the diarrhea started after discharge on 10/18/2023, and she has been having bouts approximate every 30 minutes.  The diarrhea is nonbloody, and is watery in consistency.  She endorsed that due to the diarrhea being so frequent she stopped eating because \"it would go right through me\" but stated she has had good fluid intake.  She also endorsed that her rash has worsened since discharge.  The rash now encompasses her entire body except for her hands feet and face.  Of note, patient was recently admitted to [facility] for concerns of right lower extremity wound infection.  Patient was treated with IV antibiotics and was discharged after 24 hours on p.o. amoxicillin and ciprofloxacin.    In the ED:  -Vitals: Temp 98.6 F, HR 98, RR 20, BP 91/74, SPO2 95% RA  -Labs: CMP: Glucose 179, BUN/CR 57/5.32, troponin 66-55, otherwise unremarkable             CBC: WBC 32.4 (ANC 27.57), H/H11.5/35.9  -Imaging: None  -Interventions: Benadryl 25 mg IV, famotidine 25 mg IV, 1250 mL NS bolus, stool cultures obtained, C. difficile PCR obtained.    PMH: A-fib (on Xarelto, history of bifascicular block, CKD 4 (baseline CR 1.5-1.9), IDDM-2, HLD, HTN,  history of MV  endocarditis (group B strep), lacunar CVA history of cerebral aneurysm and meningioma  Allergies: Adalimumab, metformin, ciprofloxacin, gluten, iodinated contrast, iodine, dairy, shellfish, statins, sulfa drugs, iodides, penicillins  FH: No family history as patient was adopted  SXH: Craniotomy (meningioma removal), tonsillectomy, transvenous pacemaker placement, endoscopic, " colonoscopy, pacemaker  SH: Denies tobacco usage, alcohol usage, illicit drug usage, lives with      CODE STATUS: DNR DNI, no ICU    Microbiology history:  10/4/2023: Wound culture:  - Pseudomonas: Resistant to gentamicin     susceptible to aztreonam, cefepime, ceftazidime, ciprofloxacin, levofloxacin, Zosyn, tobramycin  - Enterococcus faecalis: Resistant to gentamicin, tetracycline, Bactrim        susceptible to penicillin, vancomycin    10/17/2023: Wound culture:  -Pseudomonas: Resistant to gentamicin, imipenem, meropenem      Intermediate to: Gentamicin, Zosyn      susceptible to cefepime, ceftazidime, ceftolozane + tazobactam, ciprofloxacin, levofloxacin, tobramycin       Past Medical History  Past Medical History:   Diagnosis Date    Anemia, unspecified 10/09/2020    Acute anemia    Benign neoplasm of brain, unspecified (CMS/HCC) 04/24/2017    Benign brain tumor    Candidiasis of skin and nail 06/09/2017    Candidiasis, cutaneous    Central retinal artery occlusion, left eye 05/04/2022    Central retinal artery occlusion, left eye    Central retinal artery occlusion, right eye 05/04/2022    Central retinal artery occlusion, right eye    Diarrhea, unspecified 07/31/2020    Acute diarrhea    Endocarditis, valve unspecified 01/18/2019    Endocarditis    Essential (primary) hypertension 12/07/2022    Benign essential hypertension    Glaucoma secondary to other eye disorders, bilateral, stage unspecified 05/04/2022    Neovascular glaucoma of both eyes    Nonrheumatic mitral (valve) insufficiency 04/24/2017    Mitral regurgitation    Other vascular disorders of iris and ciliary body, left eye 05/04/2022    Rubeosis iridis of left eye    Other vascular disorders of iris and ciliary body, right eye 05/04/2022    Rubeosis iridis of right eye    Personal history of other diseases of the circulatory system     History of hypertension    Personal history of other diseases of urinary system     History of chronic  kidney disease    Septic arterial embolism (CMS/HCC) 05/04/2022    Cerebral septic emboli    Septic arterial embolism (CMS/HCC) 03/28/2017    Cerebral septic emboli    Unspecified color vision deficiencies 03/28/2017    Visual color changes    Unspecified retinal vascular occlusion 05/04/2022    Retinal vascular occlusion of both eyes    Unspecified visual disturbance 03/28/2017    Change in vision    Visual hallucinations 04/24/2017    Formed visual hallucinations       Surgical History  Past Surgical History:   Procedure Laterality Date    CT HEAD ANGIO W AND WO IV CONTRAST  3/28/2017    CT HEAD ANGIO W AND WO IV CONTRAST 3/28/2017 [facility] CLINICAL LEGACY    CT NECK ANGIO W AND WO IV CONTRAST  3/28/2017    CT NECK ANGIO W AND WO IV CONTRAST 3/28/2017 [facility] CLINICAL LEGACY    MR HEAD ANGIO WO IV CONTRAST  3/28/2018    MR HEAD ANGIO WO IV CONTRAST 3/28/2018 [facility] ANCILLARY LEGACY    OTHER SURGICAL HISTORY  05/27/2022    Pacemaker insertion    OTHER SURGICAL HISTORY  05/22/2017    Craniotomy Tumor Removal - Complete    OTHER SURGICAL HISTORY  07/01/2019    Colonoscopy complete for polypectomy    OTHER SURGICAL HISTORY  07/01/2019    Endoscopy    TONSILLECTOMY  05/15/2014    Tonsillectomy        Social History  She reports that she has never smoked. She has never used smokeless tobacco. She reports that she does not currently use alcohol. She reports that she does not use drugs.    Family History  Family History   Problem Relation Name Age of Onset    Other (adopted child) Mother      Other (adopted child) Father          Allergies  Adalimumab, Metformin, Ciprofloxacin, Iodinated contrast media, Iodine, Milk containing products (dairy), Shellfish containing products, Statins-hmg-coa reductase inhibitors, Sulfa (sulfonamide antibiotics), Iodides, and Penicillins    Review of Systems   Constitutional:  Positive for appetite change (Decreased p.o. intake). Negative for chills, diaphoresis, fatigue and fever.   HENT:  Negative  for congestion, ear pain, facial swelling, sinus pressure, sinus pain, sore throat and trouble swallowing.    Eyes:  Negative for photophobia, redness and visual disturbance.   Respiratory:  Negative for cough, choking, chest tightness, shortness of breath, wheezing and stridor.    Cardiovascular:  Positive for leg swelling (Chronic). Negative for chest pain and palpitations.   Gastrointestinal:  Positive for diarrhea (Massive bouts). Negative for abdominal distention, abdominal pain, blood in stool, constipation, nausea and vomiting.   Genitourinary:  Negative for difficulty urinating, dysuria, flank pain, frequency, hematuria and urgency.   Musculoskeletal:  Negative for arthralgias, back pain, joint swelling, myalgias and neck pain.   Skin:  Positive for color change (Bright red), rash (Diffuse full body exfoliative, sparing soles/palms/face) and wound (Right lower extremity wound chronic).   Neurological:  Negative for dizziness, seizures, syncope, weakness, light-headedness, numbness and headaches.        Physical Exam  General: Not in acute distress, A&O x 3, alert, cooperative, well-developed  HEENT: Normocephalic, atraumatic, EOMI, moist mucous membranes  Neck: Neck supple, trachea midline, no evidence of trauma  Cardiovascular: RRR, S1 and S2 appreciated, no murmurs rubs gallops appreciated, distal pulses 1+ bilaterally (dorsalis pedis)  Respiratory: Vesicular breath sounds appreciated bilaterally, no adventitious sounds appreciated, no increased work of breathing  GI: Abdomen soft, nondistended, nontender to palpation, bowel sounds present  Extremities: 1+ edema appreciated in lower extremities bilaterally, no cyanosis  RLE: Approximately 8 x 8 cm lesion with multiple areas of exposed subcutaneous tissue, not draining or purulent,  Neuro: A&O X3, no focal deficits, strength and sensation intact bilaterally  Skin: Warm and dry, diffuse red exfoliative rash encompassing the entire body sparing palms, soles,  "face.    Last Recorded Vitals  Blood pressure 108/55, pulse 75, temperature 37 °C (98.6 °F), temperature source Oral, resp. rate 20, height 1.473 m (4' 10\"), weight 81.6 kg (180 lb), SpO2 95 %.    Relevant Results    Scheduled medications  methylPREDNISolone sodium succinate (PF), 125 mg, intravenous, Once      Continuous medications     PRN medications  Results for orders placed or performed during the hospital encounter of 10/21/23 (from the past 24 hour(s))   CBC and Auto Differential   Result Value Ref Range    WBC 32.4 (H) 4.4 - 11.3 x10*3/uL    nRBC 0.0 0.0 - 0.0 /100 WBCs    RBC 3.91 (L) 4.00 - 5.20 x10*6/uL    Hemoglobin 11.5 (L) 12.0 - 16.0 g/dL    Hematocrit 35.9 (L) 36.0 - 46.0 %    MCV 92 80 - 100 fL    MCH 29.4 26.0 - 34.0 pg    MCHC 32.0 32.0 - 36.0 g/dL    RDW 14.3 11.5 - 14.5 %    Platelets 325 150 - 450 x10*3/uL    MPV 10.7 7.5 - 11.5 fL    Neutrophils % 85.0 40.0 - 80.0 %    Immature Granulocytes %, Automated 2.2 (H) 0.0 - 0.9 %    Lymphocytes % 6.3 13.0 - 44.0 %    Monocytes % 4.4 2.0 - 10.0 %    Eosinophils % 1.8 0.0 - 6.0 %    Basophils % 0.3 0.0 - 2.0 %    Neutrophils Absolute 27.57 (H) 1.60 - 5.50 x10*3/uL    Immature Granulocytes Absolute, Automated 0.70 (H) 0.00 - 0.50 x10*3/uL    Lymphocytes Absolute 2.05 0.80 - 3.00 x10*3/uL    Monocytes Absolute 1.43 (H) 0.05 - 0.80 x10*3/uL    Eosinophils Absolute 0.58 (H) 0.00 - 0.40 x10*3/uL    Basophils Absolute 0.11 (H) 0.00 - 0.10 x10*3/uL   Comprehensive Metabolic Panel   Result Value Ref Range    Glucose 179 (H) 74 - 99 mg/dL    Sodium 136 136 - 145 mmol/L    Potassium 5.0 3.5 - 5.3 mmol/L    Chloride 104 98 - 107 mmol/L    Bicarbonate 21 21 - 32 mmol/L    Anion Gap 16 10 - 20 mmol/L    Urea Nitrogen 57 (H) 6 - 23 mg/dL    Creatinine 5.32 (H) 0.50 - 1.05 mg/dL    eGFR 8 (L) >60 mL/min/1.73m*2    Calcium 9.0 8.6 - 10.3 mg/dL    Albumin 3.1 (L) 3.4 - 5.0 g/dL    Alkaline Phosphatase 122 33 - 136 U/L    Total Protein 6.2 (L) 6.4 - 8.2 g/dL    AST 31 " 9 - 39 U/L    Bilirubin, Total 0.4 0.0 - 1.2 mg/dL    ALT 21 7 - 45 U/L   Troponin I, High Sensitivity   Result Value Ref Range    Troponin I, High Sensitivity 66 (HH) 0 - 13 ng/L   Lactate   Result Value Ref Range    Lactate 1.9 0.4 - 2.0 mmol/L   Troponin I, High Sensitivity   Result Value Ref Range    Troponin I, High Sensitivity 55 (HH) 0 - 13 ng/L          Assessment/Plan   Principal Problem:    Acute renal failure, unspecified acute renal failure type (CMS/[facility])  Active Problems:    Essential (primary) hypertension    Candidiasis    Cardiac pacemaker in situ    Venous insufficiency (chronic) (peripheral)    Stage 3 chronic kidney disease (CMS/[facility])    Type 2 diabetes mellitus with unspecified complications (CMS/[facility])    Hyperlipidemia, unspecified    Obesity, unspecified    Obstructive sleep apnea syndrome    Chronic atrial fibrillation (CMS/[facility])  Patient is a 73-year-old female with PMH significant for A-fib (on Xarelto, history of bifascicular block), CKD 4 (baseline CR 1.5-1.9), IDDM-2, HLD, HTN,  history of MV  endocarditis (group B strep), lacunar CVA history of cerebral aneurysm and meningioma.  Patient presented to the ED on 10/22/2023 for increasing bouts of diarrhea along with diffuse rash.  Of note patient was recently admitted to [facility], and was discharged on p.o. amoxicillin + ciprofloxacin for right lower extremity wound infection with Pseudomonas.  Incidentally found in the ED via labs, she appeared to be be in acute renal failure with creatinine of 5.32, from baseline 1.5.  Patient was admitted to medicine for evaluation and management of acute renal failure in setting of diarrheal illness and extensive rash.    1.  Acute renal failure likely prerenal in nature versus AIN :-(baseline 1.5-1.9) patient endorsed massive bouts of diarrhea, decreased p.o. intake since discharge, along with antibiotic administration.  -Patient's creatinine on admission 5.32, creatinine on 10/17/2023 1.3  -Patient s/p  1250 mL NS bolus  -Additional 1 L NS bolus ordered  -Avoid all nephrotoxic agents  -Continue to trend RFP    2.  Exfoliative dermatitis/erythroderma?: Patient presented to the ED with diffuse red rash encompassing the entire body sparing palms, soles, face.  -Patient recently discharged home on 10/18/2023 with amoxicillin and ciprofloxacin, concern for drug-induced exanthems  -Review of patient's home medications reveal she does take allopurinol and lisinopril, drugs with known associations with exfoliative dermatitis may consider stopping Allopurinol.   -In setting of decreased p.o. intake + massive diarrhea, along with acute renal failure is a possibility 2/2 decreased drug clearance  -Augmentin, ciprofloxacin discontinued on admission  -Defer antibiotics at this time  -Patient received 25 mg Benadryl IV, 20 mg famotidine IV  ---> Famotidine is to be dosed 10 mg every other day in setting of acute renal failure  -125 mg Solu-Medrol ordered as per pharmacy's recommendations  -Continue to monitor clinical picture    3.  Diarrheal illness, C. difficile?: Patient presents to the ED complaining of multiple bouts of diarrhea approximately every 30 minutes since discharge  -Patient meets indications for possible C. difficile infection due to recent hospitalization, antibiotic usage  -WBC 32.4, ANC 27.57  -Stool panel ordered pending results  -C. difficile PCR ordered results pending  -Due to white count, clinical picture, description of diarrhea vancomycin 125 mg p.o. 4 times daily was started    4.Cellulitis of right lower extremity: Chronic, follows with Dr. Cain   -Wound approximately 8 x 8 cm  -Saw Dr. Cain on 10/17/23 for wound debridement  -Wound cultures from last admission illustrating a blunted Pseudomonas growth multidrug-resistant  --> Antibiotics deferred at this time due to patient's diffuse rash consider adding cefepime tomorrow  ----> Deferred with reasoning for rash evaluation, if rash worsens will be  unable to determine if it is from new antibiotics.  We reached out to pharmacy and discussed this and they agreed.  -Wound ostomy nurse consulted  -Consider ID consult, defer to day team      5.  IDDM 2:    -Mild SSI  6. HTN:   7.  A-fib/bifascicular block:   -Electronically atrially paced  8.  HLD:   9.  GERD:   -Continue home medications once med rec is complete    IVF: None at this time  Diet: Diabetic + renal  DVT prophylaxis: Heparin subcu  Dispo: Anticipate 3-4 days hospital stay  Consults: None at this time    CODE STATUS: DNR/DNI no ICU    Shorty Newton MD  Internal medicine PGY 1      Patient seen and examined independently of intern physician.  The above documentation reflects my direct input.    Rober Syed DO PGY-2  Internal medicine [facility]

## 2023-10-23 NOTE — NURSING NOTE
Pt remained stable throughout shift. 1/4 blood cultures drawn in 10/21 came back positive for clustered gram + cocci.  notified and no new orders were put in at this time. No complaints of pain or discomfort. Pt in bed with call light in reach.

## 2023-10-23 NOTE — NURSING NOTE
0700: Assumed care of this patient at this time.    0940: Pt. Denies pain or discomfort at this time. Pt. Resting in the bed. Will follow throughout this shift.    1830: Pt. Was able to have uneventful day today. The pt. Resting at this time and was able to give very small sample of stool to send for PCR c-diff today. The pt. Able to ambulate to the [facility] with 1-2 assist. The pt. Has call light in reach and bed in lowest position. Will follow throughout this shift.

## 2023-10-23 NOTE — CARE PLAN
The patient's goals for the shift include  no new or worsening symptoms.     The clinical goals for the shift include See care plan

## 2023-10-23 NOTE — CONSULTS
Wound Care Consult     Visit Date: 10/23/2023      Patient Name: Kira Solo         MRN: [id number]           YOB: 1950     Reason for Consult: RLE Wound         Wound History: Carmen states that she has had it for quite awhile.      Pertinent Labs:   Albumin   Date Value Ref Range Status   10/22/2023 3.1 (L) 3.4 - 5.0 g/dL Final   12/31/2022 3.8 3.4 - 5.0 g/dL Final     Albumin (Data Conversion)   Date Value Ref Range Status   06/09/2021 11.8 mg/dL Final     ALBUMIN (MG/L) IN URINE   Date Value Ref Range Status   07/18/2023 159.5 Not Established mg/L Final       Wound Assessment:  Wound 10/17/23 Venous Ulcer Pretibial Right (Active)   Site Assessment Red 10/23/23 1201   Caryl-Wound Assessment Red 10/23/23 1201   Margins Well-defined edges 10/23/23 1201   Drainage Description Yellow 10/23/23 1201   Drainage Amount Small 10/23/23 1201   Dressing Status Clean;Dry;Other (Comment) 10/23/23 1201       Wound Team Summary Assessment: RLE noted to have 3 open wounds, all with red wound beds and well-defined wound edges. Drainage is small amount of yellow drainage. Surrounding tissue red and intact. Patient's entire body is red with dry peeling skin.      Wound Team Plan: Recommendation for wound care to RLE - cleanse with NS, apply Xeroform, cover with ABD pad and secure with Kerlix daily. Recommend applying Aquaphor to dry skin. Will follow.      Eneida Wright RN  10/23/2023  12:04 PM

## 2023-10-23 NOTE — PROGRESS NOTES
Late entry for progress note 10/22/2023    Kira Solo is a 73 y.o. female on day 1 of admission presenting with Acute renal failure, unspecified acute renal failure type (CMS/HCC).      Subjective   Seen and evaluated in the emergency room, she reported the rash is better than when she came to the emergency room, no fever no chills, she still not able to urinate, reported rash is not itchy, no other complaints, daughter at the bedside all questions were answered.       Objective     Last Recorded Vitals  /58 (BP Location: Left arm, Patient Position: Lying)   Pulse 71   Temp 36.9 °C (98.4 °F) (Temporal)   Resp 20   Wt 84 kg (185 lb 3 oz)   SpO2 93%   Intake/Output last 3 Shifts:    Intake/Output Summary (Last 24 hours) at 10/23/2023 1453  Last data filed at 10/23/2023 0934  Gross per 24 hour   Intake 1668.33 ml   Output 50 ml   Net 1618.33 ml       Admission Weight  Weight: 81.6 kg (180 lb) (10/21/23 1707)    Daily Weight  10/22/23 : 84 kg (185 lb 3 oz)        Physical Exam  General: Not in acute distress, A&O x 3, alert, cooperative, well-developed  HEENT: Normocephalic, atraumatic, EOMI, moist mucous membranes  Neck: Neck supple, trachea midline, no evidence of trauma  Cardiovascular: RRR, S1 and S2 appreciated, no murmurs rubs gallops appreciated, distal pulses 1+ bilaterally (dorsalis pedis)  Respiratory: Vesicular breath sounds appreciated bilaterally, no adventitious sounds appreciated, no increased work of breathing  GI: Abdomen soft, nondistended, nontender to palpation, bowel sounds present  Extremities: 1+ edema appreciated in lower extremities bilaterally, no cyanosis  RLE: Approximately 8 x 8 cm lesion with multiple areas of exposed subcutaneous tissue, not draining or purulent,  Neuro: A&O X3, no focal deficits, strength and sensation intact bilaterally  Skin: Warm and dry, diffuse red exfoliative rash encompassing the entire body sparing palms, soles, face.      Relevant Results      Scheduled medications  amLODIPine, 2.5 mg, oral, Daily  aspirin, 81 mg, oral, Daily  brimonidine, 1 drop, Both Eyes, BID  ezetimibe, 10 mg, oral, Daily  famotidine, 10 mg, oral, Every other day  ferrous sulfate, 65 mg of iron, oral, Daily  gentamicin, , Topical, Daily  heparin (porcine), 5,000 Units, subcutaneous, q8h  icosapent ethyL, , oral, Daily  insulin glargine, 13 Units, subcutaneous, Nightly  insulin lispro, 0-5 Units, subcutaneous, Before meals & nightly  latanoprost, 1 drop, Both Eyes, Daily  metoprolol succinate XL, 25 mg, oral, Daily  polyethylene glycol, 17 g, oral, Daily  vancomycin, 125 mg, oral, 4x daily      Continuous medications  sodium bicarbonate, 100 mL/hr      PRN medications  PRN medications: dextrose 10 % in water (D10W), dextrose, glucagon  Results for orders placed or performed during the hospital encounter of 10/21/23 (from the past 24 hour(s))   POCT GLUCOSE   Result Value Ref Range    POCT Glucose 233 (H) 74 - 99 mg/dL   POCT GLUCOSE   Result Value Ref Range    POCT Glucose 239 (H) 74 - 99 mg/dL   CBC and Auto Differential   Result Value Ref Range    WBC 31.7 (H) 4.4 - 11.3 x10*3/uL    nRBC 0.0 0.0 - 0.0 /100 WBCs    RBC 3.68 (L) 4.00 - 5.20 x10*6/uL    Hemoglobin 10.6 (L) 12.0 - 16.0 g/dL    Hematocrit 34.4 (L) 36.0 - 46.0 %    MCV 94 80 - 100 fL    MCH 28.8 26.0 - 34.0 pg    MCHC 30.8 (L) 32.0 - 36.0 g/dL    RDW 14.5 11.5 - 14.5 %    Platelets 342 150 - 450 x10*3/uL    MPV 10.6 7.5 - 11.5 fL    Neutrophils % 86.6 40.0 - 80.0 %    Immature Granulocytes %, Automated 3.1 (H) 0.0 - 0.9 %    Lymphocytes % 6.7 13.0 - 44.0 %    Monocytes % 3.1 2.0 - 10.0 %    Eosinophils % 0.1 0.0 - 6.0 %    Basophils % 0.4 0.0 - 2.0 %    Neutrophils Absolute 27.47 (H) 1.60 - 5.50 x10*3/uL    Immature Granulocytes Absolute, Automated 0.97 (H) 0.00 - 0.50 x10*3/uL    Lymphocytes Absolute 2.12 0.80 - 3.00 x10*3/uL    Monocytes Absolute 0.98 (H) 0.05 - 0.80 x10*3/uL    Eosinophils Absolute 0.03 0.00 - 0.40  x10*3/uL    Basophils Absolute 0.13 (H) 0.00 - 0.10 x10*3/uL   Renal Function Panel   Result Value Ref Range    Glucose 249 (H) 74 - 99 mg/dL    Sodium 135 (L) 136 - 145 mmol/L    Potassium 4.9 3.5 - 5.3 mmol/L    Chloride 106 98 - 107 mmol/L    Bicarbonate 17 (L) 21 - 32 mmol/L    Anion Gap 17 10 - 20 mmol/L    Urea Nitrogen 78 (H) 6 - 23 mg/dL    Creatinine 5.60 (H) 0.50 - 1.05 mg/dL    eGFR 7 (L) >60 mL/min/1.73m*2    Calcium 8.7 8.6 - 10.3 mg/dL    Phosphorus 5.6 (H) 2.5 - 4.9 mg/dL    Albumin 3.1 (L) 3.4 - 5.0 g/dL   POCT GLUCOSE   Result Value Ref Range    POCT Glucose 252 (H) 74 - 99 mg/dL     No results found.      Assessment/Plan                  Principal Problem:    Acute renal failure, unspecified acute renal failure type (CMS/[facility])  Active Problems:    Essential (primary) hypertension    Candidiasis    Cardiac pacemaker in situ    Venous insufficiency (chronic) (peripheral)    Stage 3 chronic kidney disease (CMS/[facility])    Type 2 diabetes mellitus with unspecified complications (CMS/[facility])    Hyperlipidemia, unspecified    Obesity, unspecified    Obstructive sleep apnea syndrome    Chronic atrial fibrillation (CMS/[facility])    1.  Acute renal failure likely prerenal in nature versus AIN  (baseline 1.5-1.9) patient endorsed massive bouts of diarrhea, decreased p.o. intake since discharge, along with antibiotic administration.  Urone lytes c/w pre-renal, encourage oral hydration continue with  cc/h.  -Patient's creatinine on admission 5.32, creatinine on 10/17/2023 1.3  -Avoid all nephrotoxic agents  -Continue to trend RFP  -renal US ordered  -If no improvement with IV fluid will consider nephrology consult.     2.  Exfoliative dermatitis/erythroderma?: Patient presented to the ED with diffuse red rash encompassing the entire body sparing palms, soles, face.  -Patient recently discharged home on 10/18/2023 with amoxicillin and ciprofloxacin, concern for drug-induced exanthems  -Review of patient's home  medications reveal she does take allopurinol and lisinopril, drugs with known associations with exfoliative dermatitis ,  Allopurinol currently on hold. possibility 2/2 decreased drug clearance  -Patient received 25 mg Benadryl IV, 20 mg famotidine IV, will give as needed  ---> Famotidine is to be dosed 10 mg every other day in setting of acute renal failure  -Continue to monitor clinical picture     3.  Diarrheal illness, C. difficile?: Patient presents to the ED complaining of multiple bouts of diarrhea approximately every 30 minutes since discharge  -Patient meets indications for possible C. difficile infection due to recent hospitalization, antibiotic usage  -WBC 32.4, ANC 27.57, meets crtieria for severe c.diff until proven otherwise  -Stool panel ordered came -ve  -C. difficile PCR ordered results pending  -Due to white count, clinical picture, description of diarrhea vancomycin 125 mg p.o. 4 times daily was started     4.Cellulitis of right lower extremity: Chronic, follows with Dr. Cain   -Wound approximately 8 x 8 cm  -Saw Dr. Cain on 10/17/23 for wound debridement  -Wound cultures from last admission illustrating a blunted Pseudomonas growth multidrug-resistant  -Oral antibiotics were held on admission started on cefepime according to the culture and sensitivity.  Watch for closely because of concern of C. difficile.  Cultures from last 2 admission growing Pseudomonas concern about colonization.  --Wound ostomy nurse consulted      5.  IDDM 2:    -Mild SSI  6. HTN:   7.  A-fib/bifascicular block:   -Electronically atrially paced  8.  HLD:   9.  GERD:   -Continue home medications once med rec is complete     IVF: None at this time  Diet: Diabetic + renal  DVT prophylaxis: Heparin subcu  Dispo: Anticipate 3-4 days hospital stay  Consults: None at this time     CODE STATUS: DNR/DNI no ICU              Naomie Hopkins MD

## 2023-10-23 NOTE — PROGRESS NOTES
"   10/23/23 6484   Current Planned Discharge Disposition   Current Planned Discharge Disposition Home     Met with pt and  at bedside to review dc plan. Pt lives at home with  in a 4th floor apartment. There is an elevator in the building, no steps to navigate. Pt's  drives during the daytime. Gets groceries as needed but also utilizes \"[employer]\" and similar services for meals. Are thinking about starting grocery delivery from local grocery stores. Uses a walker and/or 4 prong cane in the home. Prescriptions are filled at [facility] and pt denies any difficulty obtaining. Bedside RN requested PT/OT orders. Pt is open to [facility] if it is recommended.   "

## 2023-10-23 NOTE — PROGRESS NOTES
Kira Solo is a 73 y.o. female on day 1 of admission presenting with Acute renal failure, unspecified acute renal failure type (CMS/HCC).    Subjective   Patient seen and examined at bedside, there is diffused exfoliative rash is increasing to involve the face today (not sure of baseline evaluation), nonpainful rash.  Diarrhea is improving after starting vancomycin (patient had total of 3 episodes within the last 24 hours and compared to 8 episodes prior to admission).  She completed antibiotic treatment which were stopped today.  C. difficile test was not completed by the lab, another sample is to be obtained today to confirm/rule out C. difficile, she is currently on vancomycin.  No acute events overnight.  PT/OT was consulted, LR was stopped and replaced with bicarb due to worsening kidney function and reduced bicarb level.  Nephrology consulted.  If no improvement by tomorrow patient might need to be transferred to [facility].       Objective     Physical Exam  General: Not in acute distress, A&O x 3, alert, cooperative, well-developed  HEENT: Normocephalic, atraumatic, EOMI, moist mucous membranes  Neck: Neck supple, trachea midline, no evidence of trauma  Cardiovascular: RRR, S1 and S2 appreciated, no murmurs rubs gallops appreciated, distal pulses 1+ bilaterally (dorsalis pedis)  Respiratory: Vesicular breath sounds appreciated bilaterally, no adventitious sounds appreciated, no increased work of breathing  GI: Abdomen soft, nondistended, nontender to palpation, bowel sounds present  Extremities: 1+ edema appreciated in lower extremities bilaterally, no cyanosis  RLE: RLE is wrapped but previously exam revealed approximately 8 x 8 cm lesion with multiple areas of exposed subcutaneous tissue, not draining or purulent,  Neuro: A&O X3, no focal deficits, strength and sensation intact bilaterally  Skin: Warm and dry, diffuse red exfoliative rash encompassing the entire body sparing palms, soles, but extending to  "neck and face (worsening).    Last Recorded Vitals  Blood pressure 130/82, pulse 73, temperature 36.7 °C (98.1 °F), temperature source Temporal, resp. rate 20, height 1.48 m (4' 10.27\"), weight 84 kg (185 lb 3 oz), SpO2 97 %.  Intake/Output last 3 Shifts:  I/O last 3 completed shifts:  In: 1618.3 (19.3 mL/kg) [P.O.:120; I.V.:1498.3 (17.8 mL/kg)]  Out: - (0 mL/kg)   Weight: 84 kg     Relevant Results  Scheduled medications  amLODIPine, 2.5 mg, oral, Daily  aspirin, 81 mg, oral, Daily  brimonidine, 1 drop, Both Eyes, BID  ezetimibe, 10 mg, oral, Daily  famotidine, 10 mg, oral, Every other day  ferrous sulfate, 65 mg of iron, oral, Daily  heparin (porcine), 5,000 Units, subcutaneous, q8h  icosapent ethyL, , oral, Daily  insulin lispro, 0-5 Units, subcutaneous, TID with meals  latanoprost, 1 drop, Both Eyes, Daily  metoprolol succinate XL, 25 mg, oral, Daily  polyethylene glycol, 17 g, oral, Daily  vancomycin, 125 mg, oral, 4x daily      Continuous medications  lactated Ringer's, 100 mL/hr, Last Rate: 100 mL/hr (10/23/23 0944)      PRN medications  PRN medications: dextrose 10 % in water (D10W), dextrose, glucagon  Results from last 7 days   Lab Units 10/23/23  1040 10/22/23  1408 10/21/23  1841   WBC AUTO x10*3/uL 31.7* 29.8* 32.4*   RBC AUTO x10*6/uL 3.68* 3.85* 3.91*   HEMOGLOBIN g/dL 10.6* 11.4* 11.5*     Results from last 7 days   Lab Units 10/23/23  1040 10/22/23  1436 10/22/23  1408 10/21/23  1841 10/17/23  1725   SODIUM mmol/L 135* 135* 134* 136 140   POTASSIUM mmol/L 4.9 5.0 5.4* 5.0 4.5   CHLORIDE mmol/L 106 105 104 104 110*   CO2 mmol/L 17* 15* 15* 21 21   BUN mg/dL 78* 63* 63* 57* 35*   CREATININE mg/dL 5.60* 5.18* 5.25* 5.32* 1.38*   CALCIUM mg/dL 8.7 8.8 8.8 9.0 9.2   PHOSPHORUS mg/dL 5.6* 5.5*  --   --   --    MAGNESIUM mg/dL  --   --  2.12  --   --    BILIRUBIN TOTAL mg/dL  --   --  0.4 0.4 0.3   ALT U/L  --   --  21 21 12   AST U/L  --   --  29 31 22       No results found.     Assessment/Plan "   Principal Problem:    Acute renal failure, unspecified acute renal failure type (CMS/HCC)  Active Problems:    Essential (primary) hypertension    Candidiasis    Cardiac pacemaker in situ    Venous insufficiency (chronic) (peripheral)    Stage 3 chronic kidney disease (CMS/[facility])    Type 2 diabetes mellitus with unspecified complications (CMS/[facility])    Hyperlipidemia, unspecified    Obesity, unspecified    Obstructive sleep apnea syndrome    Chronic atrial fibrillation (CMS/[facility])    Patient is a 73-year-old female with PMH significant for A-fib (on Xarelto, history of bifascicular block), CKD 4 (baseline CR 1.5-1.9), IDDM-2, HLD, HTN,  history of MV  endocarditis (group B strep), lacunar CVA history of cerebral aneurysm and meningioma.  Patient presented to the ED on 10/22/2023 for increasing bouts of diarrhea along with diffuse rash.  Of note patient was recently admitted to [facility], and was discharged on p.o. amoxicillin + ciprofloxacin for right lower extremity wound infection with Pseudomonas.  Incidentally found in the ED via labs, she appeared to be be in acute renal failure with creatinine of 5.32, from baseline 1.5.  Patient was admitted to medicine for evaluation and management of acute renal failure in setting of diarrheal illness and extensive rash with suspicion for C. difficile and Cordero-Canelo like syndrome/drug-induced skin reaction.     1.  Acute renal failure likely prerenal in nature versus AIN (worsening) :  -(baseline 1.5-1.9) patient endorsed massive bouts of diarrhea, decreased p.o. intake since discharge, along with antibiotic administration.  -Patient's creatinine on admission 5.32, creatinine on 10/17/2023 1.3.  Creatinine is 5.6 on 10/23/2023  -IVF (LR) was started initially, then was replaced with bicarb at 100 mL/hour (considering low bicarb)  -Avoid all nephrotoxic agents  -Continue to trend RFP  -Nephrology consulted (Dr. Perry)     2.  Exfoliative dermatitis/erythroderma,, concern for  SJS like syndrome versus drug-induced dermatitis/rash, AGEP (acute generalized exanthematous pustulosis (worsening):  -Patient presented to the ED with diffuse red rash encompassing the entire body sparing palms, soles, face.  -Patient recently discharged home on 10/18/2023 with amoxicillin and ciprofloxacin, concern for drug-induced exanthems  -Review of patient's home medications reveal she does take allopurinol and lisinopril, drugs with known associations with exfoliative dermatitis for which allopurinol was stopped.  -Differential diagnosis involve SJC, TEN, AGEP (not a typical presentation for any of them considering total body surface and time of erythema).  Stopping the offending agent is warranted.  Some benefit from systemic steroids was reported.  -In setting of decreased p.o. intake + massive diarrhea, along with acute renal failure is a possibility 2/2 decreased drug clearance  -Augmentin, ciprofloxacin discontinued on admission  -Defer antibiotics at this time  -Patient received 25 mg Benadryl IV, 20 mg famotidine IV  ---> Famotidine is to be dosed 10 mg every other day in setting of acute renal failure  -125 mg Solu-Medrol was given twice (last 10/23/23)  -Continue to monitor clinical picture  -If no improvement or worsening tomorrow, might consider transfer to [facility] (treated in the intensive care/burn unit)     3.  Diarrheal illness, concern for C. difficile (improving):  -Patient presents to the ED complaining of multiple bouts of diarrhea approximately every 30 minutes since discharge  -Patient meets indications for possible C. difficile infection due to recent hospitalization, antibiotic usage  -WBC 32.4, ANC 27.57 upon admission  -Stool panel negative  -C. difficile PCR ordered results pending  -Due to white count, clinical picture, description of diarrhea vancomycin 125 mg p.o. 4 times daily was started     4.Cellulitis of right lower extremity: Chronic, follows with Dr. Cain   -Wound  approximately 8 x 8 cm  -Saw Dr. Cain on 10/17/23 for wound debridement  -Wound cultures from last admission illustrating a blunted Pseudomonas growth multidrug-resistant  -Started on ceftazidime  ----> Deferred with reasoning for rash evaluation, if rash worsens will be unable to determine if it is from new antibiotics.  We reached out to pharmacy and discussed this and they agreed.  -Wound ostomy nurse consulted  -We will reevaluate and consider ID consult      5.  IDDM 2:    -Mild SSI  6. HTN:   7.  A-fib/bifascicular block:   -Electronically atrially paced  8.  HLD:   9.  GERD:   -Continue home medications     IVF: None at this time  Diet: Diabetic + renal  DVT prophylaxis: Heparin subcu  Dispo: Anticipate 3-4 days hospital stay  Consults: Nephrology, PT/OT     CODE STATUS: DNR/DNI no ICU    Discussed with attending,    This note has been transcribed using Dragon voice recognition system and there is a possibility of unintentional typing misprints.  Any information found to be copied from previous providers is done in the best interest of the patient to provide accurate, quality, and continuity of care.           Jl Kitchen MD

## 2023-10-24 NOTE — PROGRESS NOTES
Occupational Therapy    Evaluation    Patient Name: Kira Solo  MRN: [id number]  Today's Date: 10/24/2023  Time Calculation  Start Time: 1122  Stop Time: 1140  Time Calculation (min): 18 min        Assessment:  End of Session Communication: Bedside nurse  End of Session Patient Position: Up in chair, Alarm on     Plan:  Treatment Interventions: ADL retraining, Functional transfer training, UE strengthening/ROM, Endurance training, Patient/family training  OT Frequency: 3 times per week  OT Discharge Recommendations: Moderate intensity level of continued care  Treatment Interventions: ADL retraining, Functional transfer training, UE strengthening/ROM, Endurance training, Patient/family training    Subjective   Current Problem:  1. Acute renal failure, unspecified acute renal failure type (CMS/HCC)        2. C. difficile diarrhea        3. Cellulitis of right lower extremity        4. Leukocytosis, unspecified type        5. Allergic reaction, initial encounter        6. Rash          General:  General  Reason for Referral: impaired ADLs; patient to ED with weakness, acute renal failure, diarrhea, rash, R LE wounds  Referred By: Dr. Hopkins; 10/23/2023  Past Medical History Relevant to Rehab:  (A-fib (on Xarelto, history of bifascicular block), CKD 4 (baseline CR 1.5-1.9), IDDM-2, HLD, HTN,  history of MV  endocarditis (group B strep), lacunar CVA history of cerebral aneurysm and meningioma)  Co-Treatment: PT  Co-Treatment Reason: facilitate safety and activity tolerance  Prior to Session Communication: Bedside nurse  Patient Position Received:  (seated at edge of bed.)  Precautions:  Medical Precautions: Fall precautions  Precautions Comment: patient with severe skin rash, nursing advising on contact precautions as with significantly skin sluffing off patient    Pain:  Pain Assessment  Pain Assessment: 0-10  Pain Score:  ((L) UE pain)    Objective   Cognition:  Overall Cognitive Status:  (patient seems confused  "throughout session, difficult to understand if this baseline)       Home Living:  Home Living Comments: patient reporting that she lives with her  in a one floor apartment; indepedent with ADLs and IADLs; patient with WIS with shower chair, rollator at PLOF; patient declines recent falls; patient's  drives but reporting \"he can only make (R) hand turns\"    ADL:  ADL Comments: Toileting: total 2/2 purwick, UB dressing: anticipate MIN A; LB dressing: anticipate MOD A; Grooming/Feeding: MOD I  Activity Tolerance:  Endurance: Decreased tolerance for upright activites  Bed Mobility/Transfers:     and Transfers  Transfer:  (sit to stand with mod assist of 2, stand to sit with mod assist of 1; functional mobility with MOD A and FWW assist, cues for negotiation as with poor safety)     Sensation:  Sensation Comment: declines numbness/tingling  Strength:  Strength Comments: (B) UE grossly 4/5    Extremities: RUE   RUE : Within Functional Limits and LUE   LUE: Within Functional Limits ((L) UE edema noted)      Outcome Measures:[facility] Daily Activity  Putting on and taking off regular lower body clothing: A lot  Bathing (including washing, rinsing, drying): A little  Putting on and taking off regular upper body clothing: A little  Toileting, which includes using toilet, bedpan or urinal: Total  Taking care of personal grooming such as brushing teeth: A little  Eating Meals: A little  Daily Activity - Total Score: 15        Education Documentation  Body Mechanics, taught by Margot Daniels OT at 10/24/2023  2:27 PM.  Learner: Patient  Readiness: Acceptance  Method: Explanation  Response: Verbalizes Understanding, Needs Reinforcement    ADL Training, taught by Margot Daniels OT at 10/24/2023  2:27 PM.  Learner: Patient  Readiness: Acceptance  Method: Explanation  Response: Verbalizes Understanding, Needs Reinforcement    Education Comments  No comments found.        OP EDUCATION:  Education  Individual(s) " Educated: Patient  Education Provided: Ergonomics and postural realignment, Joint protection and energy conservation, Fall precautons  Patient Response to Education: Patient/Caregiver Verbalized Understanding of Information      Goals:  Encounter Problems       Encounter Problems (Active)       OT Goals       Patient will complete functional transfers at MOD I  level with LRD to facilitate increased independence and safety with home going  (Progressing)       Start:  10/24/23    Expected End:  11/07/23            Patient will complete functional mobility for household distances at MOD I level with LRD to facilitate increased independence and safety with home going  (Progressing)       Start:  10/24/23    Expected End:  11/07/23            Patient will complete LB dressing at MOD I level to facilitate safety and independence for home going   (Progressing)       Start:  10/24/23    Expected End:  11/07/23            Patient will complete UB dressing at MOD I level to facilitate safety and independence for home going   (Progressing)       Start:  10/24/23    Expected End:  11/07/23            Patient will complete toileting at MOD I level to facilitate safety and independence for home going   (Progressing)       Start:  10/24/23    Expected End:  11/07/23

## 2023-10-24 NOTE — PROGRESS NOTES
Kira Solo is a 73 y.o. female on day 2 of admission presenting with Acute renal failure, unspecified acute renal failure type (CMS/HCC).    Subjective   Patient seen and examined at bedside this morning, she reports that her rash is itchy but not as painful. States she has had lots of dry, ashy skin peeling off. Her diarrhea has improved, one episode with semi-formed, non watery stool. Denies any nausea, vomiting, or abdominal pain. Denies any sloughing of skin.       Objective     Physical Exam  Constitutional:       General: She is awake.      Appearance: Normal appearance.   HENT:      Head: Normocephalic and atraumatic.      Mouth/Throat:      Comments: No oral ulcers or rash involving oral mucosa.  Eyes:      General: No scleral icterus.     Extraocular Movements: Extraocular movements intact.      Conjunctiva/sclera:      Right eye: Right conjunctiva is not injected.      Left eye: Left conjunctiva is not injected.   Neck:      Thyroid: No thyromegaly.   Cardiovascular:      Rate and Rhythm: Normal rate and regular rhythm.      Heart sounds: Normal heart sounds.   Pulmonary:      Effort: Pulmonary effort is normal.      Breath sounds: Normal breath sounds.   Abdominal:      General: Abdomen is protuberant. Bowel sounds are normal.      Palpations: Abdomen is soft.      Tenderness: There is no abdominal tenderness.   Musculoskeletal:      Cervical back: Full passive range of motion without pain and neck supple.   Skin:     Findings: Rash present.      Comments: Warm and dry, diffuse red exfoliative rash encompassing the entire body sparing palms, soles, but extending to neck and face, improved mildly from day prior.    Neurological:      General: No focal deficit present.      Mental Status: She is alert and oriented to person, place, and time. Mental status is at baseline.   Psychiatric:         Attention and Perception: Attention normal.         Mood and Affect: Mood normal.         Speech: Speech  "normal.         Behavior: Behavior is cooperative.         Last Recorded Vitals  Blood pressure 154/78, pulse 71, temperature 36.7 °C (98.1 °F), temperature source Temporal, resp. rate 16, height 1.48 m (4' 10.27\"), weight 84 kg (185 lb 3 oz), SpO2 100 %.  Intake/Output last 3 Shifts:  I/O last 3 completed shifts:  In: 2676.7 (31.9 mL/kg) [I.V.:2626.7 (31.3 mL/kg); IV Piggyback:50]  Out: 50 (0.6 mL/kg) [Urine:50 (0 mL/kg/hr)]  Weight: 84 kg     Relevant Results  Scheduled medications  amLODIPine, 2.5 mg, oral, Daily  aspirin, 81 mg, oral, Daily  brimonidine, 1 drop, Both Eyes, BID  diphenhydrAMINE, 25 mg, oral, Once  ezetimibe, 10 mg, oral, Daily  famotidine, 10 mg, oral, Every other day  ferrous sulfate, 65 mg of iron, oral, Daily  gentamicin, , Topical, Daily  heparin (porcine), 5,000 Units, subcutaneous, q8h  icosapent ethyL, , oral, Daily  insulin glargine, 13 Units, subcutaneous, Nightly  insulin lispro, 0-5 Units, subcutaneous, Before meals & nightly  latanoprost, 1 drop, Both Eyes, Daily  metoprolol succinate XL, 25 mg, oral, Daily  polyethylene glycol, 17 g, oral, Daily  predniSONE, 40 mg, oral, Daily      Continuous medications  sodium bicarbonate, 100 mL/hr, Last Rate: 100 mL/hr (10/24/23 0629)      PRN medications  PRN medications: dextrose 10 % in water (D10W), dextrose, glucagon  Results from last 7 days   Lab Units 10/24/23  0624 10/23/23  1040 10/22/23  1408   WBC AUTO x10*3/uL 22.6* 31.7* 29.8*   RBC AUTO x10*6/uL 3.78* 3.68* 3.85*   HEMOGLOBIN g/dL 11.0* 10.6* 11.4*       Results from last 7 days   Lab Units 10/24/23  0624 10/23/23  1040 10/22/23  1436 10/22/23  1408 10/21/23  1841 10/17/23  1725   SODIUM mmol/L 137 135* 135* 134* 136 140   POTASSIUM mmol/L 4.9 4.9 5.0 5.4* 5.0 4.5   CHLORIDE mmol/L 102 106 105 104 104 110*   CO2 mmol/L 19* 17* 15* 15* 21 21   BUN mg/dL 92* 78* 63* 63* 57* 35*   CREATININE mg/dL 5.30* 5.60* 5.18* 5.25* 5.32* 1.38*   CALCIUM mg/dL 8.6 8.7 8.8 8.8 9.0 9.2   PHOSPHORUS " mg/dL 5.9* 5.6* 5.5*  --   --   --    MAGNESIUM mg/dL 2.27  --   --  2.12  --   --    BILIRUBIN TOTAL mg/dL  --   --   --  0.4 0.4 0.3   ALT U/L  --   --   --  21 21 12   AST U/L  --   --   --  29 31 22         No results found.     Assessment/Plan   Principal Problem:    Acute renal failure, unspecified acute renal failure type (CMS/[facility])  Active Problems:    Essential (primary) hypertension    Candidiasis    Cardiac pacemaker in situ    Venous insufficiency (chronic) (peripheral)    Stage 3 chronic kidney disease (CMS/[facility])    Type 2 diabetes mellitus with unspecified complications (CMS/[facility])    Hyperlipidemia, unspecified    Obesity, unspecified    Obstructive sleep apnea syndrome    Chronic atrial fibrillation (CMS/[facility])    Patient is a 73-year-old female with PMH significant for A-fib (on Xarelto, history of bifascicular block), CKD 4 (baseline CR 1.5-1.9), IDDM-2, HLD, HTN,  history of MV  endocarditis (group B strep), lacunar CVA history of cerebral aneurysm and meningioma.  Patient presented to the ED on 10/22/2023 for increasing bouts of diarrhea along with diffuse rash.  Of note patient was recently admitted to [facility], and was discharged on p.o. amoxicillin + ciprofloxacin for right lower extremity wound infection with Pseudomonas.  Incidentally found in the ED via labs, she appeared to be be in acute renal failure with creatinine of 5.32, from baseline 1.5.  Patient was admitted to medicine for evaluation and management of acute renal failure in setting of diarrheal illness and extensive rash with suspicion for C. difficile and Cordero-Canelo like syndrome/drug-induced skin reaction. Dermatology tele-consult preliminarily recommends starting prednisone 40 mg once daily, pending further evaluation and recommendations.     #Acute renal failure likely prerenal in nature versus AIN (improving) :  - Baseline Cr 1.5-1.9, patient endorsed massive bouts of diarrhea, decreased p.o. intake since discharge, along with  antibiotic administration.  - Patient's creatinine improving slowly.   - IVF (LR) was started initially, then was replaced with bicarb at 100 mL/hour (considering low bicarb).  - Avoid all nephrotoxic agents.  - Continue to trend RFP.  - Nephrology consulted (Dr. Perry), appreciate recommendations.     #Exfoliative dermatitis/erythroderma, concern for SJS like syndrome versus drug-induced dermatitis/rash, AGEP (acute generalized exanthematous pustulosis) (stable):  - Patient presented to the ED with diffuse red rash encompassing the entire body sparing palms, soles, face.  - Patient recently discharged home on 10/18/2023 with amoxicillin and ciprofloxacin, concern for drug-induced exanthems.  - Allopurinol held given associations with drug induced dermatitis. Augmentin, ciprofloxacin discontinued on admission as well.  - Dermatology telehealth consulted, preliminarily recommending 40 mg prednisone once daily pending further evaluation and recommendations.  - Gentamicin cream to be applied to legs bilaterally.  - Patient received 25 mg Benadryl IV, 20 mg famotidine IV  - Famotidine is to be dosed 10 mg every other day in setting of acute renal failure  - 125 mg Solu-Medrol was given twice (last 10/23/23)  - Continue to monitor clinical picture, hemodynamic stability.    #Gram positive cocci bacteremia  #Leukocytosis, improving  - B/c from 10/21 growing GPC, staph epidermidis and hominis   - Will start clindamycin 900 mg q8h   - Infectious disease consult, appreciate recommendations, concern for disseminated staphylococcal infection.  - Daily CBC, BMP.    #Diarrhea, infectious vs adverse drug reaction (improving):  - Patient presents to the ED complaining of multiple bouts of diarrhea approximately every 30 minutes since discharge.  - C. Diff panel negative, stool not watery, decreased frequency.  - Will discontinue vancomycin given above.   - Will start patient on lactose free diet to see if she has some degree of  lactose intolerance contributing to her diarrhea.      #Cellulitis of right lower extremity: Chronic, follows with Dr. Cain   - Wound approximately 8 x 8 cm.  - Saw Dr. Cain on 10/17/23 for wound debridement.  - Wound cultures from last admission illustrating a blunted Pseudomonas growth multidrug-resistant.  - Holding antibiotics given above rash. Discussed with pharmacy who agreed.  - Wound ostomy nurse consulted.  - We will reevaluate and consider ID consult if necessary.      #IDDM 2:    - Mild SSI  - Increasing Lantus to 20 units given nightly given she has been on solumedrol and will be starting prednisone.    #HTN:   #A-fib/bifascicular block:   -Electronically atrially paced  #HLD:   #GERD:   -Continue home medications     IVF: None at this time  Diet: Diabetic + renal + lactose free  DVT prophylaxis: Heparin subcu  Dispo: Anticipate 3-4 days hospital stay  Consults: Nephrology, PT/OT     CODE STATUS: DNR/DNI no ICU    This is a preliminary note, please await final signature from attending physician.    Gibran Georges, MS4  Internal Medicine    ----------------------------------------------------------------------------------------  I saw this patient with the above medical student and performed my own History and Physical Examination.   My Subjective and Objective findings are reflected in the above documentation.  The Assessment and Plan reflect my input. My Edits are included in the above documentation.  Additionally, blood cultures from 10/21/2023 revealed staph/normal joseph, not sure if it was contamination, a new order for blood cultures was placed this afternoon to confirm, ID was consulted and patient was started on vancomycin and clindamycin for SSSS concern.  As part of investigating the patient generalized exfoliating rash in the setting of her previous cellulitis, antibiotic treatment, allopurinol, ALEKSEY on CKD and clinical presentation, we ordered immunology panel (NORMAN with reflex SANCHO, CRP, ESR,  aldolase, immunoglobin, hepatitis panel, HIV, complement panel) as well as flow cytometry to rule out any malignancy (concern for T-cell lymphoma to rule out Sezary syndrome especially with the presence of prior imaging finding suggestive of mass/lesions in the mediastinum area).  We will continue to monitor the patient hemodynamically and clinically and follow-up the previous orders/consults upon availability of results.    Discussed with attending,  Jl Kitchen M.D. PGY-2  Internal Medicine    This note has been transcribed using Dragon voice recognition system and there is a possibility of unintentional typing misprints.  Any information found to be copied from previous providers is done in the best interest of the patient to provide accurate, quality, and continuity of care.

## 2023-10-24 NOTE — PROGRESS NOTES
"Vancomycin Dosing by Pharmacy- INITIAL    Kira Solo is a 73 y.o. year old female who Pharmacy has been consulted for vancomycin dosing for other Bacteremia . Based on the patient's indication and renal status this patient will be dosed based on a goal trough/random level of 15-20.     Renal function is currently stable.    Visit Vitals  /70   Pulse 81   Temp 36.7 °C (98.1 °F) (Temporal)   Resp 20        Lab Results   Component Value Date    CREATININE 5.25 (H) 10/24/2023    CREATININE 5.30 (H) 10/24/2023    CREATININE 5.60 (H) 10/23/2023    CREATININE 5.18 (H) 10/22/2023        Patient weight is No results found for: \"PTWEIGHT\"    No results found for: \"CULTURE\"     I/O last 3 completed shifts:  In: 2676.7 (31.9 mL/kg) [I.V.:2626.7 (31.3 mL/kg); IV Piggyback:50]  Out: 50 (0.6 mL/kg) [Urine:50 (0 mL/kg/hr)]  Weight: 84 kg   [unfilled]    Lab Results   Component Value Date    PATIENTTEMP 37.0 05/16/2022          Assessment/Plan     Patient will not be given a loading dose.  Will initiate vancomycin maintenance, a one time dose of 1500 mg.    This dosing regimen is predicted by [employer] to result in the following pharmacokinetic parameters:    Follow-up level will be ordered on 10/25 at 1600 unless clinically indicated sooner.  Will continue to monitor renal function daily while on vancomycin and order serum creatinine at least every 48 hours if not already ordered.  Follow for continued vancomycin needs, clinical response, and signs/symptoms of toxicity.       Matt Bland, PharmD       "

## 2023-10-24 NOTE — CONSULTS
INPATIENT NEPHROLOGY CONSULT NOTE        CONSULT: Nephrology SERVICE    PATIENT NAME: Kira Solo    MRN: [id number]  DATE of SERVICE: October 24, 2023  TIME of SERVICE: 8:56 AM      REASON FOR CONSULT:  ALEKSEY  REQUESTING PHYSICIAN:  Dr. Hopkins  PRIMARY CARE PHYSICIAN: Stephania Warner MD    HPI:  Ms. Solo is a 73 y.o. female who presents for eval of ALEKSEY.      73-year-old female with multiple comorbidities including chronic kidney disease stage IIIb/IV follows with Dr. Porras, history of paroxysmal atrial fibrillation, hypertension, coronary artery disease, type 2 diabetes complicated with diabetic nephropathy, history of mitral valve endocarditis in 2017, meningioma resection in 2014, CVA with lacunar infarct, septic emboli.  Chronic lymphedema for which patient uses lymphedema pump.    Baseline serum creatinine 1.7 mg deciliter with EGFR of 30 as of July 2023    Presented to [facility] for further evaluation of worsening diarrhea along with diffuse skin rash, and her podiatry was concerned about worsening right foot wound.  Patient reports that she was using lymphedema pump and noted fluid leaking along the side of her leg patient cannot bend it due to limited mobility however when she saw her podiatry he was concerned about the right leg being infected and he advised her to be admitted.  Patient reports that she has a history of psoriasis initially thought the diffuse skin erythema was related to underlying psoriasis.    Labs on presentation demonstrated a creatinine 5.6 mg/dL with a BUN of 78 and GFR 70 mill per minute per 1.73 m² with concomitant metabolic acidosis bicarb 16.  Patient received volume with repeat creatinine 5.3 and BUN up to 97 EGFR 80 mill per minute.  Glucose 332.      ASSESSMENT AND PLAN:  1.  Acute kidney injury superimposed on underlying chronic kidney disease stage IV, acute kidney injury multifactorial component  of diarrhea, infectious related due to right lower extremity cellulitis, suspected AIN due to diffuse disseminated rash.  Patient with underlying autoimmune disease including psoriasis and glomerulonephritis is also in the differential diagnosis.    Patient has been oliguric with metabolic acidosis bicarb 16 serum creatinine around 5.3 and BUN of 97 despite IV fluid.    Diarrhea improved and IV fluid can be discontinued at this point.      2.  Profound metabolic acidosis possibly due to uremic acidemia and diarrhea to add sodium bicarbonate    3.  Advanced chronic kidney disease stage IV, diabetic nephropathy    4.  Hypertension    5.  Complicated cardiac history including prior history of infective endocarditis affecting mitral valve, atrial fibrillation on chronic anticoagulation, coronary artery disease    6.  Prior history of CVA with septic emboli    7.  Lymphedema requiring lymphedema pump    8.  Psoriasis    Plan:  oliguric severe acute kidney injury superimposed on advanced chronic kidney disease.    Dialysis education provided tentative plan to proceed with hemodialysis if there is no improvement in renal parameters within 1 to 2 days.    Volume status optimized resuscitated with LR and IV sodium bicarbonate.     Diarrhea improved to discontinue further IV fluid    To dose vancomycin to daily trough    Serology work-up in process, patient with high inflammatory markers elevated ESR of 83 CRP of 12.7.    Diarrhea to rule out C. difficile.  Currently receiving IV clindamycin.    Receiving high-dose of vancomycin 1.5 g at risk of vancomycin toxicity due to lacking muscle mass and current weight likely fat weight rather than muscle weight.    N.p.o. after midnight for possible dialysis catheter placement    We will continue to monitor closely with you, thank you!    I sincerely, thank you for this opportunity to participate in the care of your patient, I will follow from renal standpoint      PAST MEDICAL  HISTORY:    Past Medical History:   Diagnosis Date    Anemia, unspecified 10/09/2020    Acute anemia    Benign neoplasm of brain, unspecified (CMS/[facility]) 04/24/2017    Benign brain tumor    Candidiasis of skin and nail 06/09/2017    Candidiasis, cutaneous    Central retinal artery occlusion, left eye 05/04/2022    Central retinal artery occlusion, left eye    Central retinal artery occlusion, right eye 05/04/2022    Central retinal artery occlusion, right eye    Diarrhea, unspecified 07/31/2020    Acute diarrhea    Endocarditis, valve unspecified 01/18/2019    Endocarditis    Essential (primary) hypertension 12/07/2022    Benign essential hypertension    Glaucoma secondary to other eye disorders, bilateral, stage unspecified 05/04/2022    Neovascular glaucoma of both eyes    Nonrheumatic mitral (valve) insufficiency 04/24/2017    Mitral regurgitation    Other vascular disorders of iris and ciliary body, left eye 05/04/2022    Rubeosis iridis of left eye    Other vascular disorders of iris and ciliary body, right eye 05/04/2022    Rubeosis iridis of right eye    Personal history of other diseases of the circulatory system     History of hypertension    Personal history of other diseases of urinary system     History of chronic kidney disease    Septic arterial embolism (CMS/[facility]) 05/04/2022    Cerebral septic emboli    Septic arterial embolism (CMS/[facility]) 03/28/2017    Cerebral septic emboli    Unspecified color vision deficiencies 03/28/2017    Visual color changes    Unspecified retinal vascular occlusion 05/04/2022    Retinal vascular occlusion of both eyes    Unspecified visual disturbance 03/28/2017    Change in vision    Visual hallucinations 04/24/2017    Formed visual hallucinations       PAST SURGICAL HISTORY:    Past Surgical History:   Procedure Laterality Date    CT HEAD ANGIO W AND WO IV CONTRAST  3/28/2017    CT HEAD ANGIO W AND WO IV CONTRAST 3/28/2017 [facility] CLINICAL LEGACY    CT NECK ANGIO W AND WO IV  CONTRAST  3/28/2017    CT NECK ANGIO W AND WO IV CONTRAST 3/28/2017 [facility] CLINICAL LEGACY    MR HEAD ANGIO WO IV CONTRAST  3/28/2018    MR HEAD ANGIO WO IV CONTRAST 3/28/2018 [facility] ANCILLARY LEGACY    OTHER SURGICAL HISTORY  05/27/2022    Pacemaker insertion    OTHER SURGICAL HISTORY  05/22/2017    Craniotomy Tumor Removal - Complete    OTHER SURGICAL HISTORY  07/01/2019    Colonoscopy complete for polypectomy    OTHER SURGICAL HISTORY  07/01/2019    Endoscopy    TONSILLECTOMY  05/15/2014    Tonsillectomy       FAMILY HISTORY:    Family History   Problem Relation Name Age of Onset    Other (adopted child) Mother      Other (adopted child) Father         SOCIAL HISTORY:    Social History     Tobacco Use    Smoking status: Never    Smokeless tobacco: Never   Vaping Use    Vaping Use: Never used   Substance Use Topics    Alcohol use: Not Currently    Drug use: Never       MEDICATIONS:  Prior to Admission Medications:  Medications Prior to Admission   Medication Sig Dispense Refill Last Dose    allopurinol (Zyloprim) 100 mg tablet Take 1 tablet (100 mg) by mouth once daily. 90 tablet 2 Past Week    amLODIPine (Norvasc) 5 mg tablet Take 0.5 tablets (2.5 mg) by mouth once daily.   Past Week    amoxicillin (Amoxil) 500 mg capsule Take 1 capsule (500 mg) by mouth every 8 hours for 7 days. 21 capsule 0     ascorbic acid (VITAMIN C ORAL) Take 1 tablet by mouth once daily. CHEW 1 TABLET DAILY   Past Week    aspirin 81 mg EC tablet Take 1 tablet (81 mg) by mouth once daily.   Past Week    biotin/folic ac/vit Bcomp,C/Zn (BIOTIN FORTE ORAL) Take 1 capsule by mouth once daily.   Past Week    brimonidine (AlphaGAN P) 0.2 % ophthalmic solution Administer 1 drop into both eyes 2 times a day.   Past Week    cetirizine (ZyrTEC) 10 mg tablet Take 1 tablet (10 mg) by mouth once daily. 30 tablet 0     cholecalciferol (Vitamin D-3) 25 MCG (1000 UT) tablet Take 2 tablets (50 mcg) by mouth once daily.   Past Week    ciprofloxacin (Cipro) 750 mg  tablet Take 1 tablet (750 mg) by mouth every 12 hours for 7 days. 14 tablet 0     coenzyme Q-10 10 mg capsule Take 1 tablet by mouth once daily.   Past Week    ezetimibe (Zetia) 10 mg tablet Take 1 tablet (10 mg) by mouth once daily.   Past Week    famotidine (Pepcid) 40 mg tablet Take 1 tablet (40 mg) by mouth once daily.   Past Week    ferrous sulfate 325 (65 Fe) MG tablet Take 1 tablet (65 mg of iron) by mouth once daily.   Past Week    fish oil concentrate (Omega-3) 120-180 mg capsule Take 1 capsule (1 g) by mouth once daily.   Past Week    furosemide (Lasix) 20 mg tablet Take 1 tablet (20 mg) by mouth once daily as needed (swelling). 90 tablet 0 n/a    [] gentamicin (Garamycin) 0.1 % cream Apply topically once daily for 10 days. Apply to right lower leg ulcer daily and dressing changes ordered. 30 g 1     ketoconazole (NIZOral) 2 % shampoo Apply topically 3 times a week. (Patient not taking: Reported on 10/17/2023) 120 mL 0     latanoprost (Xalatan) 0.005 % ophthalmic solution Administer 1 drop into both eyes once daily. 1.5 mL 11 Past Week    lisinopril 40 mg tablet Take 1 tablet (40 mg) by mouth once daily.   Past Week    losartan (Cozaar) 25 mg tablet Take 1 tablet (25 mg) by mouth once daily at bedtime.   Past Week    metoprolol succinate XL (Toprol-XL) 25 mg 24 hr tablet Take 1 tablet (25 mg) by mouth once daily.   Past Week    multivitamin (Multiple Vitamins) tablet Take 1 tablet by mouth once daily.   Past Week    nystatin (Mycostatin) 100,000 unit/gram powder Apply 1 Application topically if needed for itching. 30 g 0 Past Week    predniSONE (Deltasone) 20 mg tablet Take 3 tablets (60 mg) by mouth once daily for 3 days, THEN 2 tablets (40 mg) once daily for 3 days, THEN 1 tablet (20 mg) once daily for 3 days. 18 tablet 0     red yeast rice 600 mg tablet Take 1 tablet by mouth once daily.   Past Week    Semglee,insulin glarg-yfgn,Pen 100 unit/mL (3 mL) insulin pen Inject 13 Units as directed once  daily at bedtime.   Past Week    triamcinolone (Kenalog) 0.1 % cream Apply 1 Application topically 2 times a day as needed.   Past Week    Xarelto 15 mg tablet Take 1 tablet (15 mg) by mouth once daily.   Past Week       INPATIENT MEDICATIONS:    Current Facility-Administered Medications:     amLODIPine (Norvasc) tablet 2.5 mg, 2.5 mg, oral, Daily, Jl Kitchen MD, 2.5 mg at 10/23/23 0903    aspirin EC tablet 81 mg, 81 mg, oral, Daily, Jl Kitchen MD, 81 mg at 10/23/23 0904    brimonidine (AlphaGAN) 0.2 % ophthalmic solution 1 drop, 1 drop, Both Eyes, BID, Jl Kitchen MD, 1 drop at 10/23/23 2138    dextrose 10 % in water (D10W) infusion, 0.3 g/kg/hr, intravenous, Once PRN, Jl Kitchen MD    dextrose 50 % injection 25 g, 25 g, intravenous, q15 min PRN, Jl Kitchen MD    diphenhydrAMINE (BENADryl) capsule 25 mg, 25 mg, oral, Once, Abhijit Gutierrez,     ezetimibe (Zetia) tablet 10 mg, 10 mg, oral, Daily, Jl Kitchen MD, 10 mg at 10/23/23 0904    famotidine (Pepcid) tablet 10 mg, 10 mg, oral, Every other day, Jl Kitchen MD, 10 mg at 10/23/23 0904    ferrous sulfate 325 (65 Fe) MG tablet 65 mg of iron, 65 mg of iron, oral, Daily, Jl Kitchen MD, 65 mg of iron at 10/23/23 0903    gentamicin (Garamycin) 0.1 % cream, , Topical, Daily, Jl Kitchen MD, Given at 10/23/23 1545    glucagon (Glucagen) injection 1 mg, 1 mg, intramuscular, q15 min PRN, Jl Kitchen MD    heparin (porcine) injection 5,000 Units, 5,000 Units, subcutaneous, q8h, Jl Kitchen MD, 5,000 Units at 10/23/23 2138    icosapent ethyL (Vascepa) capsule, , oral, Daily, Jl Kitchen MD, 1 g at 10/23/23 0904    insulin glargine (Lantus) injection 13 Units, 13 Units, subcutaneous, Nightly, Jl Kitchen MD, 13 Units at 10/23/23 2137    insulin lispro (HumaLOG) injection 0-5 Units, 0-5 Units, subcutaneous, Before meals & nightly, Abhijit Gutierrez DO, 4 Units at 10/23/23 2137    latanoprost (Xalatan) 0.005 % ophthalmic solution 1 drop,  1 drop, Both Eyes, Daily, Jl Kitchen MD, 1 drop at 10/23/23 0905    metoprolol succinate XL (Toprol-XL) 24 hr tablet 25 mg, 25 mg, oral, Daily, Jl Kitchen MD, 25 mg at 10/23/23 0904    polyethylene glycol (Glycolax, Miralax) packet 17 g, 17 g, oral, Daily, Jl Kitchen MD    sodium bicarbonate 150 mEq in dextrose 5 % in water (D5W) 1,000 mL infusion, 100 mL/hr, intravenous, Continuous, Abhijit Gutierrez, DO, Last Rate: 100 mL/hr at 10/24/23 0629, 100 mL/hr at 10/24/23 0629    vancomycin (Vancocin) capsule 125 mg, 125 mg, oral, 4x daily, Jl Kitchen MD, 125 mg at 10/24/23 0606    ALLERGIES:  Allergies   Allergen Reactions    Adalimumab Other     endocarditis    Metformin Diarrhea    Iodinated Contrast Media Other    Iodine Unknown    Milk Containing Products (Dairy) Diarrhea    Shellfish Containing Products Diarrhea    Statins-Hmg-Coa Reductase Inhibitors Myalgia    Sulfa (Sulfonamide Antibiotics) Unknown    Amoxicillin Rash    Ciprofloxacin Rash     Shivering and feeling cold    Iodides Rash     Topical    Latex Rash     Underwear and bra    Penicillins Rash       COMPLETE REVIEW OF SYSTEMS:    A comprehensive 14 point review of systems was obtained.  Constitutional: Positive for weakness, right leg ulcer and pain  HENT: Negative for congestion and sore throat.    Eyes: Negative for pain and visual disturbance.  Respiratory: Negative for cough and shortness of breath.    Cardiovascular: Negative for chest pain and palpitations.  Gastrointestinal: Positive for abdominal pain and diarrhea  Genitourinary: Positive for oliguria  Musculoskeletal: Positive for back pain and myalgias.  Skin: negative for wound. Negative for color change and rash.  Neurological: Negative for weakness and headaches.  Hematological: Negative for adenopathy. Does not bruise/bleed easily.  Psychiatric/Behavioral: Negative for agitation and confusion.  All other reviewed and negative other than HPI.    PHYSICAL EXAM: Physical exam  performed.  Patient Vitals for the past 24 hrs:   BP Temp Temp src Pulse Resp SpO2   10/24/23 0044 100/58 36.7 °C (98.1 °F) Temporal 61 18 95 %   10/23/23 2100 139/69 36.4 °C (97.5 °F) Temporal 76 18 98 %   10/23/23 1600 129/75 36.5 °C (97.7 °F) Temporal 70 18 100 %   10/23/23 1200 118/58 36.9 °C (98.4 °F) Temporal 71 20 93 %     Body mass index is 38.35 kg/m².    CONSTITUTIONAL:  Vital signs reviewed. Hemodynamically stable.  GENERAL: Chronically ill female with muscle atrophy  SKIN: Diffuse erythematous skin rash, plaque-like with peeling  HEAD/SINUSES: Atraumatic  EYES: PERRLA, + pallor  OROPHARYNX: Dry mucous membranes  NECK: +  jugulovenous distention, No carotid bruits, Carotid pulse normal contour, Supple  LUNGS: Diminished air entry bilaterally, fine Rales  CARDIAC: distant S1 and S2; no rubs, murmurs, or gallops  ABDOMEN: Abdomen soft, non-tender, BS normal, distended  EXTREMITIES: Good capillary refill, +2 edema.  Right leg in dressing  NEUROLOGIC: Awake, alert and oriented ×3, No focal deficit  HEMATOLOGIC/Lymphatic/Immunologic: No abnormal bleeding, echymosis, inflammation. No cervical or supraclavicular lymphadenopathy.    Intake/Output last 3 shifts:  I/O last 3 completed shifts:  In: 2676.7 (31.9 mL/kg) [I.V.:2626.7 (31.3 mL/kg); IV Piggyback:50]  Out: 50 (0.6 mL/kg) [Urine:50 (0 mL/kg/hr)]  Weight: 84 kg     Diagnostic tests reviewed for today's visit:    CBC, Coags, RNP, Mg, Phos   Results from last 7 days   Lab Units 10/24/23  0624   WBC AUTO x10*3/uL 22.6*   RBC AUTO x10*6/uL 3.78*   HEMOGLOBIN g/dL 11.0*   HEMATOCRIT % 34.1*     Results from last 7 days   Lab Units 10/24/23  0624 10/22/23  1436 10/22/23  1408   SODIUM mmol/L 137   < > 134*   POTASSIUM mmol/L 4.9   < > 5.4*   CHLORIDE mmol/L 102   < > 104   CO2 mmol/L 19*   < > 15*   BUN mg/dL 92*   < > 63*   CREATININE mg/dL 5.30*   < > 5.25*   CALCIUM mg/dL 8.6   < > 8.8   PHOSPHORUS mg/dL 5.9*   < >  --    MAGNESIUM mg/dL 2.27  --  2.12  "  BILIRUBIN TOTAL mg/dL  --   --  0.4   ALT U/L  --   --  21   AST U/L  --   --  29    < > = values in this interval not displayed.     Results from last 7 days   Lab Units 10/22/23  1019   COLOR U  Kellie*   APPEARANCE U  Hazy*   PH U  5.0   SPEC GRAV UR  1.018   PROTEIN U mg/dL 30 (1+)*   BLOOD UR  NEGATIVE   NITRITE U  NEGATIVE   WBC UR /HPF 6-10*   BACTERIA UR /HPF 2+*     IMAGING: CXR reviewed in  images.      SIGNATURE: Morales Perry MD  Nephrology and Hypertension  [address] [place]  Office phone: [phone]  FAX: [phone]      This note was partially created using voice recognition software and is inherently subject to errors including those of syntax and \"sound-alike\" substitutions which may escape proofreading.  In such instances, original meaning may be extrapolated by contextual derivation.   "

## 2023-10-24 NOTE — PROGRESS NOTES
Physical Therapy    Physical Therapy    Physical Therapy Evaluation & Treatment    Patient Name: Kira Solo  MRN: [id number]  Today's Date: 10/24/2023   Time Calculation  Start Time: 1122  Stop Time: 1140  Time Calculation (min): 18 min    Assessment/Plan   Pt requires 24 hour hands on assist for bed mobility, transfers and gait.  Pt requires encouragement to participate and cues for safety and technique.  Pt is easily fatigued.  Pt would benefit from continued, moderate intensity PT to improve independence with all functional mobility.    PT Assessment  PT Assessment Results: Decreased strength, Decreased endurance, Impaired balance, Decreased mobility, Decreased cognition, Decreased safety awareness  Rehab Prognosis: Fair  End of Session Communication: Bedside nurse  End of Session Patient Position: Up in chair, Alarm on  IP OR SWING BED PT PLAN  Inpatient or Swing Bed: Inpatient  PT Plan  Treatment/Interventions: Bed mobility, Transfer training, Gait training, Balance training, Strengthening, Endurance training, Therapeutic exercise, Therapeutic activity (Pt education)  PT Plan: Skilled PT  PT Frequency: 3 times per week  PT Discharge Recommendations: Moderate intensity level of continued care  PT Recommended Transfer Status: Assist x2, Assistive device    Current Problem:  1. Acute renal failure, unspecified acute renal failure type (CMS/[facility])        2. C. difficile diarrhea        3. Cellulitis of right lower extremity        4. Leukocytosis, unspecified type        5. Allergic reaction, initial encounter        6. Rash          Past Medical History:   Diagnosis Date    Anemia, unspecified 10/09/2020    Acute anemia    Benign neoplasm of brain, unspecified (CMS/HCC) 04/24/2017    Benign brain tumor    Candidiasis of skin and nail 06/09/2017    Candidiasis, cutaneous    Central retinal artery occlusion, left eye 05/04/2022    Central retinal artery occlusion, left eye    Central retinal artery occlusion, right  eye 05/04/2022    Central retinal artery occlusion, right eye    Diarrhea, unspecified 07/31/2020    Acute diarrhea    Endocarditis, valve unspecified 01/18/2019    Endocarditis    Essential (primary) hypertension 12/07/2022    Benign essential hypertension    Glaucoma secondary to other eye disorders, bilateral, stage unspecified 05/04/2022    Neovascular glaucoma of both eyes    Nonrheumatic mitral (valve) insufficiency 04/24/2017    Mitral regurgitation    Other vascular disorders of iris and ciliary body, left eye 05/04/2022    Rubeosis iridis of left eye    Other vascular disorders of iris and ciliary body, right eye 05/04/2022    Rubeosis iridis of right eye    Personal history of other diseases of the circulatory system     History of hypertension    Personal history of other diseases of urinary system     History of chronic kidney disease    Septic arterial embolism (CMS/HCC) 05/04/2022    Cerebral septic emboli    Septic arterial embolism (CMS/HCC) 03/28/2017    Cerebral septic emboli    Unspecified color vision deficiencies 03/28/2017    Visual color changes    Unspecified retinal vascular occlusion 05/04/2022    Retinal vascular occlusion of both eyes    Unspecified visual disturbance 03/28/2017    Change in vision    Visual hallucinations 04/24/2017    Formed visual hallucinations     Past Surgical History:   Procedure Laterality Date    CT HEAD ANGIO W AND WO IV CONTRAST  3/28/2017    CT HEAD ANGIO W AND WO IV CONTRAST 3/28/2017 [facility] CLINICAL LEGACY    CT NECK ANGIO W AND WO IV CONTRAST  3/28/2017    CT NECK ANGIO W AND WO IV CONTRAST 3/28/2017 [facility] CLINICAL LEGACY    MR HEAD ANGIO WO IV CONTRAST  3/28/2018    MR HEAD ANGIO WO IV CONTRAST 3/28/2018 [facility] ANCILLARY LEGACY    OTHER SURGICAL HISTORY  05/27/2022    Pacemaker insertion    OTHER SURGICAL HISTORY  05/22/2017    Craniotomy Tumor Removal - Complete    OTHER SURGICAL HISTORY  07/01/2019    Colonoscopy complete for polypectomy    OTHER SURGICAL  HISTORY  07/01/2019    Endoscopy    TONSILLECTOMY  05/15/2014    Tonsillectomy       Subjective     General Visit Information:  General  Reason for Referral: weakness, acute renal failure, diarrhea, rash, R LE wounds  Referred By: Dr. Hopkins  Past Medical History Relevant to Rehab:  (A-fib (on Xarelto, history of bifascicular block), CKD 4 (baseline CR 1.5-1.9), IDDM-2, HLD, HTN,  history of MV  endocarditis (group B strep), lacunar CVA history of cerebral aneurysm and meningioma)  Co-Treatment: OT  Co-Treatment Reason: For pt safety and to maximize functional outcomes.  Prior to Session Communication: Bedside nurse  Patient Position Received:  (seated at edge of bed.)    Home Living:  Home Living  Home Living Comments:  (Pt lives in an apartment with her , no stairs, elevator access, walk in shower with seat.)    Prior Level of Function:  Prior Function Per Pt/Caregiver Report  Prior Function Comments:  (Pt ambautes with a rollator inside the apartment and a front wheeled walker outside of the apartment, independent with ADL's and IADL's.)    Precautions:  Precautions  Medical Precautions: Fall precautions    Vital Signs:     Objective     Pain:       Cognition:  Cognition  Overall Cognitive Status:  (confused)    General Assessments:                                Functional Assessments:        Transfers  Transfer:  (sit to stand with mod assist of 2, stand to sit with mod assist of 1.)  Ambulation/Gait Training  Ambulation/Gait Training Performed:  (5 feet with front wheeled walker and min assist of 1, usnteady, decreased ryan, decreased step length bilaterally.)          Extremity/Trunk Assessments:        RLE   RLE :  (R LE AROM WFL)  LLE   LLE :  (L LE AROM WFL)    Outcome Measures:  [facility] Basic Mobility  Turning from your back to your side while in a flat bed without using bedrails: A little  Moving from lying on your back to sitting on the side of a flat bed without using bedrails: A  little  Moving to and from bed to chair (including a wheelchair): A lot  Standing up from a chair using your arms (e.g. wheelchair or bedside chair): A lot  To walk in hospital room: A little  Climbing 3-5 steps with railing: A lot  Basic Mobility - Total Score: 15                            Goals:  Encounter Problems       Encounter Problems (Active)       PT Problem       PT Goal 1 (Progressing)       Start:  10/24/23    Expected End:  11/07/23       Pt able to perform bed mobility with CGA assist.           PT Goal 2 (Progressing)       Start:  10/24/23    Expected End:  11/07/23       Pt able to complete all transfers with mod assist of 1.            PT Goal 3 (Progressing)       Start:  10/24/23    Expected End:  11/07/23       Pt able to ambulate 25 feet with front wheeled walker and CGA.              Safety       STG - Patient uses call light consistently to request assistance with transfers (Progressing)       Start:  10/22/23    Expected End:  10/29/23                 Education Documentation  Precautions, taught by Shannon Tello, PT at 10/24/2023  1:45 PM.  Learner: Patient  Readiness: Acceptance  Method: Explanation  Response: Needs Reinforcement    Mobility Training, taught by Shannon Tello, PT at 10/24/2023  1:45 PM.  Learner: Patient  Readiness: Acceptance  Method: Explanation  Response: Needs Reinforcement    Education Comments  No comments found.

## 2023-10-25 NOTE — PROGRESS NOTES
Physical Therapy    Physical Therapy Treatment    Patient Name: Kira Solo  MRN: [id number]  Today's Date: 10/25/2023  Time Calculation  Start Time: 1410  Stop Time: 1435  Time Calculation (min): 25 min       Assessment/Plan   PT Assessment  PT Assessment Results: Decreased strength, Decreased endurance, Impaired balance, Decreased mobility  Rehab Prognosis: Good  End of Session Communication: Bedside nurse  End of Session Patient Position: Up in chair, Alarm on  PT Plan  Inpatient/Swing Bed or Outpatient: Inpatient  PT Plan  Treatment/Interventions: Bed mobility  PT Plan: Skilled PT  PT Frequency: 3 times per week  PT Discharge Recommendations: Moderate intensity level of continued care  PT Recommended Transfer Status: Assist x1, Assistive device     10/25/23 1410   PT  Visit   PT Received On 10/25/23   Response to Previous Treatment Patient with no complaints from previous session.   General   Family/Caregiver Present Yes   Prior to Session Communication Bedside nurse   Patient Position Received   (on BSC, no alarm)   Preferred Learning Style verbal;visual   Precautions   Medical Precautions Fall precautions   Pain Assessment   Pain Assessment 0-10   Pain Score 0 - No pain   Cognition   Overall Cognitive Status WFL   Therapeutic Exercise   Therapeutic Exercise Performed Yes   Therapeutic Exercise Activity 1 marches x15   Therapeutic Exercise Activity 2 LAQ x15   Therapeutic Exercise Activity 3 PF/DF x15   Therapeutic Exercise Activity 4 pillow squeeze x10   Ambulation/Gait Training   Ambulation/Gait Training Performed Yes   Ambulation/Gait Training 1   Surface 1 Level tile   Device 1 Rolling walker   Gait Support Devices Gait belt   Assistance 1 Contact guard   Quality of Gait 1 Inconsistent stride length   Comments/Distance (ft) 1 55' x2, waddling gait, start/stop, short step length.  no Gross LOB   Transfers   Transfer Yes   Transfer 1   Transfer From 1 Commode-standard to   Transfer to 1 Stand   Technique 1  Sit to stand   Transfer Level of Assistance 1 Contact guard   Transfers 2   Transfer From 2 Sit to   Transfer to 2 Stand   Technique 2 Sit to stand   Transfer Device 2 Walker   Transfer Level of Assistance 2 Minimum assistance   PT Assessment   PT Assessment Results Decreased strength;Decreased endurance;Impaired balance;Decreased mobility   Rehab Prognosis Good   End of Session Communication Bedside nurse   End of Session Patient Position Up in chair;Alarm on   Education   Individual(s) Educated Patient   Education Provided Fall Risk   Patient/Caregiver Demonstrated Understanding yes   Plan of Care Discussed and Agreed Upon yes   Patient Response to Education Patient/Caregiver Verbalized Understanding of Information   PT Plan   Inpatient/Swing Bed or Outpatient Inpatient   PT Plan   Treatment/Interventions Bed mobility   PT Plan Skilled PT   PT Frequency 3 times per week   PT Discharge Recommendations Moderate intensity level of continued care   PT Recommended Transfer Status Assist x1;Assistive device     Outcome Measures:  [facility] Basic Mobility  Turning from your back to your side while in a flat bed without using bedrails: A little  Moving from lying on your back to sitting on the side of a flat bed without using bedrails: A little  Moving to and from bed to chair (including a wheelchair): A lot  Standing up from a chair using your arms (e.g. wheelchair or bedside chair): A little  To walk in hospital room: A little  Climbing 3-5 steps with railing: A lot  Basic Mobility - Total Score: 16  Education Documentation  Mobility Training, taught by Maxime Fernando PTA at 10/25/2023  2:48 PM.  Learner: Family, Patient  Readiness: Acceptance  Method: Explanation, Demonstration  Response: Verbalizes Understanding, Needs Reinforcement    Education Comments  No comments found.        Current Problem:  1. Bacteremia  Transthoracic Echo (TTE) Complete    Transthoracic Echo (TTE) Complete      2. Venous insufficiency (chronic)  (peripheral)        3. Acute renal failure, unspecified acute renal failure type (CMS/HCC)        4. C. difficile diarrhea        5. Cellulitis of right lower extremity        6. Leukocytosis, unspecified type        7. Allergic reaction, initial encounter        8. Rash        9. Edema of left upper extremity  Upper extremity venous duplex bilateral    Upper extremity venous duplex bilateral        EDUCATION:  Education  Individual(s) Educated: Patient  Education Provided: Fall Risk  Patient/Caregiver Demonstrated Understanding: yes  Plan of Care Discussed and Agreed Upon: yes  Patient Response to Education: Patient/Caregiver Verbalized Understanding of Information  Encounter Problems       Encounter Problems (Active)       PT Problem       PT Goal 1 (Progressing)       Start:  10/24/23    Expected End:  11/07/23       Pt able to perform bed mobility with CGA assist.           PT Goal 2 (Progressing)       Start:  10/24/23    Expected End:  11/07/23       Pt able to complete all transfers with mod assist of 1.            PT Goal 3 (Progressing)       Start:  10/24/23    Expected End:  11/07/23       Pt able to ambulate 25 feet with front wheeled walker and CGA.              Safety       STG - Patient uses call light consistently to request assistance with transfers (Progressing)       Start:  10/22/23    Expected End:  10/29/23

## 2023-10-25 NOTE — PROGRESS NOTES
[facility]. Pt is open to SNF and was provided with a [facility] SNF list for consideration.     1530 Met with pt and her . Pt has been to the [facility] in the past and would like a referral sent. DSC tasked to send.       Shanda Patel RN

## 2023-10-25 NOTE — CONSULTS
"Inpatient consult to Infectious Diseases  Consult performed by: Fernanda Baum DO  Consult ordered by: Naomie Hopkins MD          Reason For Consult  \"Bacteremia, diffuse desquamative rash, most likely disseminated staph\"    History Of Present Illness  Kira Solo is a 73 y.o. female presenting with rash and diarrhea.  She states presented to [facility] 10/21 after weakness which led to a fall in which she had to call EMS for lift assist.  Due to profound weakness was transferred to emergency room for evaluation.  She admits to worsening rash over the last 1 to 2 months that she thought was related to her psoriasis.  However she notes the last 2.5 weeks that has become progressively worse especially after recent hospitalization in which she received multiple antibiotics.  Per chart review was prescribed amoxicillin as well as ciprofloxacin for her chronic lower extremity leg wound complicated by cellulitis.  Prior wound cultures growing Pseudomonas and enterococci facialis susceptible to penicillin.  However has several allergies to penicillin which mostly include rashes no anaphylaxis or respiratory demise.   Was hospitalized at [facility] 10/17 - 10/18 for the right lower extremity cellulitis and received 24 hours of Vanco and Zosyn and was discharged on Cipro and amoxicillin. Also has \"allergy \"to ciprofloxacin in which describes shivers.  She did tolerate a few doses of Zosyn during last hospitalization. States the rash is not particularly painful though it is pruritic.  The rash is desquamative and spares face, soles, palms and mucus membranes including oral, vaginal and rectum.  No drainage, bleeding or purulence is noted at the site of the rash.  She describes her diarrhea beginning after the antibiotics of amoxicillin/ciprofloxacin after last hospital discharge and is described as watery nonbloody diarrhea.  Of note she did contact her nephrologist a few weeks ago due to abdominal swelling and weight gain in " which she increased her dose of diuretic over the last couple weeks which could explain her ALEKSEY, these sx have resolved.  Denies recent flu or cold-like illnesses, no fevers or chills. Does admit to oliguria.     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    Surgical History  She has a past surgical history that includes Tonsillectomy (05/15/2014); Other surgical history (05/27/2022); Other surgical history (05/22/2017); Other surgical history (07/01/2019); Other surgical history (07/01/2019); MR angio head wo IV contrast (3/28/2018); CT angio head w and wo IV contrast (3/28/2017); and CT angio neck w and wo IV contrast (3/28/2017).     Social History  She reports that she has never smoked. She has never used smokeless tobacco. She reports that she does not currently use alcohol. She reports that she does not use drugs.    Family History  Family  "History   Problem Relation Name Age of Onset    Other (adopted child) Mother      Other (adopted child) Father          Allergies  Adalimumab, Metformin, Iodinated contrast media, Iodine, Milk containing products (dairy), Shellfish containing products, Statins-hmg-coa reductase inhibitors, Sulfa (sulfonamide antibiotics), Amoxicillin, Ciprofloxacin, Iodides, Latex, and Penicillins    Review of Systems   All other systems reviewed and are negative, see HPI for positive ROS.     Physical Exam  Vitals and nursing note reviewed.   Constitutional:       General: She is not in acute distress.     Appearance: She is obese.   Pulmonary:      Effort: Pulmonary effort is normal. No respiratory distress.   Abdominal:      General: Abdomen is flat. Bowel sounds are normal. There is no distension.      Tenderness: There is no abdominal tenderness. There is no guarding.   Musculoskeletal:      Comments: Profound Chronic venous stasis in BL LE, R > L. RLE covered in guaze and dry dressings, CDI     Skin:     General: Skin is warm and dry.      Comments: Mildly red desquamating rash encompassing the entire body sparing palms, soles, face as well as oral, vaginal and rectal mucosa. Nonpainful to touch, no obvious purulence, bleeding or drainage.   Neurological:      Mental Status: She is alert. Mental status is at baseline.   Psychiatric:         Mood and Affect: Mood normal.         Behavior: Behavior normal.          Last Recorded Vitals  Blood pressure 142/62, pulse 70, temperature 36.4 °C (97.5 °F), temperature source Temporal, resp. rate 16, height 1.48 m (4' 10.27\"), weight 84 kg (185 lb 3 oz), SpO2 97 %.    Relevant Results  Results for orders placed or performed during the hospital encounter of 10/21/23 (from the past 96 hour(s))   CBC and Auto Differential   Result Value Ref Range    WBC 32.4 (H) 4.4 - 11.3 x10*3/uL    nRBC 0.0 0.0 - 0.0 /100 WBCs    RBC 3.91 (L) 4.00 - 5.20 x10*6/uL    Hemoglobin 11.5 (L) 12.0 - 16.0 g/dL "    Hematocrit 35.9 (L) 36.0 - 46.0 %    MCV 92 80 - 100 fL    MCH 29.4 26.0 - 34.0 pg    MCHC 32.0 32.0 - 36.0 g/dL    RDW 14.3 11.5 - 14.5 %    Platelets 325 150 - 450 x10*3/uL    MPV 10.7 7.5 - 11.5 fL    Neutrophils % 85.0 40.0 - 80.0 %    Immature Granulocytes %, Automated 2.2 (H) 0.0 - 0.9 %    Lymphocytes % 6.3 13.0 - 44.0 %    Monocytes % 4.4 2.0 - 10.0 %    Eosinophils % 1.8 0.0 - 6.0 %    Basophils % 0.3 0.0 - 2.0 %    Neutrophils Absolute 27.57 (H) 1.60 - 5.50 x10*3/uL    Immature Granulocytes Absolute, Automated 0.70 (H) 0.00 - 0.50 x10*3/uL    Lymphocytes Absolute 2.05 0.80 - 3.00 x10*3/uL    Monocytes Absolute 1.43 (H) 0.05 - 0.80 x10*3/uL    Eosinophils Absolute 0.58 (H) 0.00 - 0.40 x10*3/uL    Basophils Absolute 0.11 (H) 0.00 - 0.10 x10*3/uL   Comprehensive Metabolic Panel   Result Value Ref Range    Glucose 179 (H) 74 - 99 mg/dL    Sodium 136 136 - 145 mmol/L    Potassium 5.0 3.5 - 5.3 mmol/L    Chloride 104 98 - 107 mmol/L    Bicarbonate 21 21 - 32 mmol/L    Anion Gap 16 10 - 20 mmol/L    Urea Nitrogen 57 (H) 6 - 23 mg/dL    Creatinine 5.32 (H) 0.50 - 1.05 mg/dL    eGFR 8 (L) >60 mL/min/1.73m*2    Calcium 9.0 8.6 - 10.3 mg/dL    Albumin 3.1 (L) 3.4 - 5.0 g/dL    Alkaline Phosphatase 122 33 - 136 U/L    Total Protein 6.2 (L) 6.4 - 8.2 g/dL    AST 31 9 - 39 U/L    Bilirubin, Total 0.4 0.0 - 1.2 mg/dL    ALT 21 7 - 45 U/L   Blood Culture    Specimen: Peripheral Venipuncture; Blood culture   Result Value Ref Range    Blood Culture Staphylococcus hominis (AA)     BLOOD CULTURE BOTTLE  Positive Aerobic Bottle     Blood Culture Staphylococcus epidermidis (AA)     BLOOD CULTURE BOTTLE  Positive Aerobic Bottle     Gram Stain Gram positive cocci, clusters (AA)    Troponin I, High Sensitivity   Result Value Ref Range    Troponin I, High Sensitivity 66 (HH) 0 - 13 ng/L   Stool Pathogen Panel, PCR    Specimen: Stool   Result Value Ref Range    Campylobacter Group Not Detected Not Detected    Salmonella species  Not Detected Not Detected    Shigella species Not Detected Not Detected    Vibrio Group Not Detected Not Detected    Yersinia Enterocolitica Not Detected Not Detected    Shiga Toxin 1 Not Detected Not Detected    Shiga Toxin 2 Not Detected Not Detected    Norovirus GI/GII Not Detected Not Detected    Rotavirus A Not Detected Not Detected   C. difficile, PCR    Specimen: Stool   Result Value Ref Range    C. difficile, PCR Not Detected Not Detected   Lactate   Result Value Ref Range    Lactate 1.9 0.4 - 2.0 mmol/L   Blood Culture    Specimen: Peripheral Venipuncture; Blood culture   Result Value Ref Range    Blood Culture No growth at 3 days    Troponin I, High Sensitivity   Result Value Ref Range    Troponin I, High Sensitivity 55 (HH) 0 - 13 ng/L   Urinalysis with Reflex Microscopic and Culture   Result Value Ref Range    Color, Urine Kellie (N) Straw, Yellow    Appearance, Urine Hazy (N) Clear    Specific Gravity, Urine 1.018 1.005 - 1.035    pH, Urine 5.0 5.0, 5.5, 6.0, 6.5, 7.0, 7.5, 8.0    Protein, Urine 30 (1+) (N) NEGATIVE mg/dL    Glucose, Urine NEGATIVE NEGATIVE mg/dL    Blood, Urine NEGATIVE NEGATIVE    Ketones, Urine 5 (TRACE) (A) NEGATIVE mg/dL    Bilirubin, Urine NEGATIVE NEGATIVE    Urobilinogen, Urine <2.0 <2.0 mg/dL    Nitrite, Urine NEGATIVE NEGATIVE    Leukocyte Esterase, Urine NEGATIVE NEGATIVE   Extra Urine Gray Tube   Result Value Ref Range    Extra Tube Hold for add-ons.    Urine electrolytes   Result Value Ref Range    Sodium, Urine Random 16 mmol/L    Sodium/Creatinine Ratio 7 Not established. mmol/g Creat    Potassium, Urine Random 98 mmol/L    Potassium/Creatinine Ratio 45 Not established mmol/g Creat    Chloride, Urine Random <15 mmol/L    Chloride/Creatinine Ratio      Creatinine, Urine Random 215.7 20.0 - 320.0 mg/dL   Urinalysis Microscopic   Result Value Ref Range    WBC, Urine 6-10 (A) 1-5, NONE /HPF    RBC, Urine 1-2 NONE, 1-2, 3-5 /HPF    Bacteria, Urine 2+ (A) NONE SEEN /HPF     Hyaline Casts, Urine 3+ (A) NONE /LPF   Urine Culture    Specimen: Clean Catch/Voided; Urine   Result Value Ref Range    Urine Culture No growth    POCT GLUCOSE   Result Value Ref Range    POCT Glucose 162 (H) 74 - 99 mg/dL   POCT GLUCOSE   Result Value Ref Range    POCT Glucose 211 (H) 74 - 99 mg/dL   Comprehensive metabolic panel   Result Value Ref Range    Glucose 215 (H) 74 - 99 mg/dL    Sodium 134 (L) 136 - 145 mmol/L    Potassium 5.4 (H) 3.5 - 5.3 mmol/L    Chloride 104 98 - 107 mmol/L    Bicarbonate 15 (L) 21 - 32 mmol/L    Anion Gap 20 10 - 20 mmol/L    Urea Nitrogen 63 (H) 6 - 23 mg/dL    Creatinine 5.25 (H) 0.50 - 1.05 mg/dL    eGFR 8 (L) >60 mL/min/1.73m*2    Calcium 8.8 8.6 - 10.3 mg/dL    Albumin 3.1 (L) 3.4 - 5.0 g/dL    Alkaline Phosphatase 130 33 - 136 U/L    Total Protein 6.3 (L) 6.4 - 8.2 g/dL    AST 29 9 - 39 U/L    Bilirubin, Total 0.4 0.0 - 1.2 mg/dL    ALT 21 7 - 45 U/L   Magnesium   Result Value Ref Range    Magnesium 2.12 1.60 - 2.40 mg/dL   CBC   Result Value Ref Range    WBC 29.8 (H) 4.4 - 11.3 x10*3/uL    nRBC 0.0 0.0 - 0.0 /100 WBCs    RBC 3.85 (L) 4.00 - 5.20 x10*6/uL    Hemoglobin 11.4 (L) 12.0 - 16.0 g/dL    Hematocrit 35.9 (L) 36.0 - 46.0 %    MCV 93 80 - 100 fL    MCH 29.6 26.0 - 34.0 pg    MCHC 31.8 (L) 32.0 - 36.0 g/dL    RDW 14.5 11.5 - 14.5 %    Platelets 328 150 - 450 x10*3/uL    MPV 10.8 7.5 - 11.5 fL   SST TOP   Result Value Ref Range    Extra Tube Hold for add-ons.    Renal function panel   Result Value Ref Range    Glucose 211 (H) 74 - 99 mg/dL    Sodium 135 (L) 136 - 145 mmol/L    Potassium 5.0 3.5 - 5.3 mmol/L    Chloride 105 98 - 107 mmol/L    Bicarbonate 15 (L) 21 - 32 mmol/L    Anion Gap 20 10 - 20 mmol/L    Urea Nitrogen 63 (H) 6 - 23 mg/dL    Creatinine 5.18 (H) 0.50 - 1.05 mg/dL    eGFR 8 (L) >60 mL/min/1.73m*2    Calcium 8.8 8.6 - 10.3 mg/dL    Phosphorus 5.5 (H) 2.5 - 4.9 mg/dL    Albumin 3.1 (L) 3.4 - 5.0 g/dL   POCT GLUCOSE   Result Value Ref Range    POCT  Glucose 233 (H) 74 - 99 mg/dL   POCT GLUCOSE   Result Value Ref Range    POCT Glucose 239 (H) 74 - 99 mg/dL   CBC and Auto Differential   Result Value Ref Range    WBC 31.7 (H) 4.4 - 11.3 x10*3/uL    nRBC 0.0 0.0 - 0.0 /100 WBCs    RBC 3.68 (L) 4.00 - 5.20 x10*6/uL    Hemoglobin 10.6 (L) 12.0 - 16.0 g/dL    Hematocrit 34.4 (L) 36.0 - 46.0 %    MCV 94 80 - 100 fL    MCH 28.8 26.0 - 34.0 pg    MCHC 30.8 (L) 32.0 - 36.0 g/dL    RDW 14.5 11.5 - 14.5 %    Platelets 342 150 - 450 x10*3/uL    MPV 10.6 7.5 - 11.5 fL    Neutrophils % 86.6 40.0 - 80.0 %    Immature Granulocytes %, Automated 3.1 (H) 0.0 - 0.9 %    Lymphocytes % 6.7 13.0 - 44.0 %    Monocytes % 3.1 2.0 - 10.0 %    Eosinophils % 0.1 0.0 - 6.0 %    Basophils % 0.4 0.0 - 2.0 %    Neutrophils Absolute 27.47 (H) 1.60 - 5.50 x10*3/uL    Immature Granulocytes Absolute, Automated 0.97 (H) 0.00 - 0.50 x10*3/uL    Lymphocytes Absolute 2.12 0.80 - 3.00 x10*3/uL    Monocytes Absolute 0.98 (H) 0.05 - 0.80 x10*3/uL    Eosinophils Absolute 0.03 0.00 - 0.40 x10*3/uL    Basophils Absolute 0.13 (H) 0.00 - 0.10 x10*3/uL   Renal Function Panel   Result Value Ref Range    Glucose 249 (H) 74 - 99 mg/dL    Sodium 135 (L) 136 - 145 mmol/L    Potassium 4.9 3.5 - 5.3 mmol/L    Chloride 106 98 - 107 mmol/L    Bicarbonate 17 (L) 21 - 32 mmol/L    Anion Gap 17 10 - 20 mmol/L    Urea Nitrogen 78 (H) 6 - 23 mg/dL    Creatinine 5.60 (H) 0.50 - 1.05 mg/dL    eGFR 7 (L) >60 mL/min/1.73m*2    Calcium 8.7 8.6 - 10.3 mg/dL    Phosphorus 5.6 (H) 2.5 - 4.9 mg/dL    Albumin 3.1 (L) 3.4 - 5.0 g/dL   POCT GLUCOSE   Result Value Ref Range    POCT Glucose 252 (H) 74 - 99 mg/dL   POCT GLUCOSE   Result Value Ref Range    POCT Glucose 286 (H) 74 - 99 mg/dL   POCT GLUCOSE   Result Value Ref Range    POCT Glucose 343 (H) 74 - 99 mg/dL   Magnesium   Result Value Ref Range    Magnesium 2.27 1.60 - 2.40 mg/dL   Renal Function Panel   Result Value Ref Range    Glucose 275 (H) 74 - 99 mg/dL    Sodium 137 136 - 145  mmol/L    Potassium 4.9 3.5 - 5.3 mmol/L    Chloride 102 98 - 107 mmol/L    Bicarbonate 19 (L) 21 - 32 mmol/L    Anion Gap 21 (H) 10 - 20 mmol/L    Urea Nitrogen 92 (HH) 6 - 23 mg/dL    Creatinine 5.30 (H) 0.50 - 1.05 mg/dL    eGFR 8 (L) >60 mL/min/1.73m*2    Calcium 8.6 8.6 - 10.3 mg/dL    Phosphorus 5.9 (H) 2.5 - 4.9 mg/dL    Albumin 3.1 (L) 3.4 - 5.0 g/dL   CBC and Auto Differential   Result Value Ref Range    WBC 22.6 (H) 4.4 - 11.3 x10*3/uL    nRBC 0.1 (H) 0.0 - 0.0 /100 WBCs    RBC 3.78 (L) 4.00 - 5.20 x10*6/uL    Hemoglobin 11.0 (L) 12.0 - 16.0 g/dL    Hematocrit 34.1 (L) 36.0 - 46.0 %    MCV 90 80 - 100 fL    MCH 29.1 26.0 - 34.0 pg    MCHC 32.3 32.0 - 36.0 g/dL    RDW 14.6 (H) 11.5 - 14.5 %    Platelets 346 150 - 450 x10*3/uL    MPV 10.7 7.5 - 11.5 fL    Immature Granulocytes %, Automated 5.3 (H) 0.0 - 0.9 %    Immature Granulocytes Absolute, Automated 1.19 (H) 0.00 - 0.50 x10*3/uL   Manual Differential   Result Value Ref Range    Neutrophils %, Manual 72.0 40.0 - 80.0 %    Bands %, Manual 7.0 0.0 - 5.0 %    Lymphocytes %, Manual 17.0 13.0 - 44.0 %    Monocytes %, Manual 2.0 2.0 - 10.0 %    Eosinophils %, Manual 0.0 0.0 - 6.0 %    Basophils %, Manual 0.0 0.0 - 2.0 %    Metamyelocytes %, Manual 2.0 0.0 - 0.0 %    Seg Neutrophils Absolute, Manual 16.27 (H) 1.60 - 5.00 x10*3/uL    Bands Absolute, Manual 1.58 (H) 0.00 - 0.50 x10*3/uL    Lymphocytes Absolute, Manual 3.84 (H) 0.80 - 3.00 x10*3/uL    Monocytes Absolute, Manual 0.45 0.05 - 0.80 x10*3/uL    Eosinophils Absolute, Manual 0.00 0.00 - 0.40 x10*3/uL    Basophils Absolute, Manual 0.00 0.00 - 0.10 x10*3/uL    Metamyelocytes Absolute, Manual 0.45 0.00 - 0.00 x10*3/uL    Total Cells Counted 100     Neutrophils Absolute, Manual 17.85 (H) 1.60 - 5.50 x10*3/uL    RBC Morphology See Below     Polychromasia Mild     RBC Fragments Few    ECG 12 lead   Result Value Ref Range    Ventricular Rate 71 BPM    Atrial Rate 69 BPM    QRS Duration 118 ms    QT Interval 436  ms    QTC Calculation(Bazett) 473 ms    R Axis -42 degrees    T Axis 2 degrees    QRS Count 11 beats    Q Onset 210 ms    T Offset 428 ms    QTC Fredericia 461 ms   POCT GLUCOSE   Result Value Ref Range    POCT Glucose 273 (H) 74 - 99 mg/dL   Creatine Kinase   Result Value Ref Range    Creatine Kinase 113 0 - 215 U/L   Creatine Kinase, MB   Result Value Ref Range    CKMB 9.5 <10.0 ng/mL    CK-MB Index 8 %MB OF CK   Comprehensive metabolic panel   Result Value Ref Range    Glucose 332 (H) 74 - 99 mg/dL    Sodium 135 (L) 136 - 145 mmol/L    Potassium 4.5 3.5 - 5.3 mmol/L    Chloride 100 98 - 107 mmol/L    Bicarbonate 16 (L) 21 - 32 mmol/L    Anion Gap 24 (H) 10 - 20 mmol/L    Urea Nitrogen 97 (HH) 6 - 23 mg/dL    Creatinine 5.25 (H) 0.50 - 1.05 mg/dL    eGFR 8 (L) >60 mL/min/1.73m*2    Calcium 8.6 8.6 - 10.3 mg/dL    Albumin 3.3 (L) 3.4 - 5.0 g/dL    Alkaline Phosphatase 113 33 - 136 U/L    Total Protein 6.4 6.4 - 8.2 g/dL    AST 22 9 - 39 U/L    Bilirubin, Total 0.3 0.0 - 1.2 mg/dL    ALT 22 7 - 45 U/L   POCT GLUCOSE   Result Value Ref Range    POCT Glucose 326 (H) 74 - 99 mg/dL   Protein, Urine Random   Result Value Ref Range    Total Protein, Urine Random 64 (H) 5 - 24 mg/dL    Creatinine, Urine Random 123.6 20.0 - 320.0 mg/dL    T. Protein/Creatinine Ratio 0.52 (H) 0.00 - 0.17 mg/mg Creat   Sodium, Urine Random   Result Value Ref Range    Sodium, Urine Random 19 mmol/L    Creatinine, Urine Random 123.6 20.0 - 320.0 mg/dL    Sodium/Creatinine Ratio 15 Not established. mmol/g Creat   Chloride, Urine Random   Result Value Ref Range    Chloride, Urine Random <15 mmol/L    Creatinine, Urine Random 123.6 20.0 - 320.0 mg/dL    Chloride/Creatinine Ratio     Sedimentation rate, automated   Result Value Ref Range    Sedimentation Rate 83 (H) 0 - 30 mm/h   C-reactive protein   Result Value Ref Range    C-Reactive Protein 12.77 (H) <1.00 mg/dL   HIV-1 and HIV-2 antibodies   Result Value Ref Range    HIV 1/2 Antigen/Antibody  Screen with Reflex to Confirmation Nonreactive Nonreactive   IgE   Result Value Ref Range    IgE 1,270 (H) 0 - 214 IU/mL   C3 complement   Result Value Ref Range    C3 Complement 174 87 - 200 mg/dL   C4 complement   Result Value Ref Range    C4 Complement 46 10 - 50 mg/dL   Hepatitis B surface antibody   Result Value Ref Range    Hepatitis B Surface AB <3.1 <10.0 mIU/mL   Hepatitis C antibody   Result Value Ref Range    Hepatitis C AB Nonreactive Nonreactive   POCT GLUCOSE   Result Value Ref Range    POCT Glucose 297 (H) 74 - 99 mg/dL   Magnesium   Result Value Ref Range    Magnesium 2.38 1.60 - 2.40 mg/dL   Renal Function Panel   Result Value Ref Range    Glucose 290 (H) 74 - 99 mg/dL    Sodium 139 136 - 145 mmol/L    Potassium 4.7 3.5 - 5.3 mmol/L    Chloride 103 98 - 107 mmol/L    Bicarbonate 21 21 - 32 mmol/L    Anion Gap 20 10 - 20 mmol/L    Urea Nitrogen 100 (HH) 6 - 23 mg/dL    Creatinine 4.81 (H) 0.50 - 1.05 mg/dL    eGFR 9 (L) >60 mL/min/1.73m*2    Calcium 8.1 (L) 8.6 - 10.3 mg/dL    Phosphorus 6.1 (H) 2.5 - 4.9 mg/dL    Albumin 3.0 (L) 3.4 - 5.0 g/dL   CBC   Result Value Ref Range    WBC 18.2 (H) 4.4 - 11.3 x10*3/uL    nRBC 0.0 0.0 - 0.0 /100 WBCs    RBC 3.57 (L) 4.00 - 5.20 x10*6/uL    Hemoglobin 10.3 (L) 12.0 - 16.0 g/dL    Hematocrit 32.4 (L) 36.0 - 46.0 %    MCV 91 80 - 100 fL    MCH 28.9 26.0 - 34.0 pg    MCHC 31.8 (L) 32.0 - 36.0 g/dL    RDW 14.4 11.5 - 14.5 %    Platelets 326 150 - 450 x10*3/uL    MPV 10.8 7.5 - 11.5 fL   POCT GLUCOSE   Result Value Ref Range    POCT Glucose 261 (H) 74 - 99 mg/dL      ECG 12 lead    Result Date: 10/24/2023  Electronic atrial pacemaker with occasional Premature atrial complexes Left anterior fascicular block with late transition Incomplete right bundle branch block Abnormal ECG When compared with ECG of 14-JUN-2023 10:06, No significant change was found Confirmed by Artemio Suggs ([id number]) on 10/24/2023 7:54:55 AM    US renal complete    Result Date:  10/24/2023  Interpreted By:  Nestor Damon, STUDY: US RENAL COMPLETE;  10/23/2023 8:54 pm   INDICATION: Signs/Symptoms:ALEKSEY.   COMPARISON: None.   ACCESSION NUMBER(S): [id number]   ORDERING CLINICIAN: CARLY GUZMAN   TECHNIQUE: Multiple images of the kidneys were obtained  .   FINDINGS: RIGHT KIDNEY: Mild cortical thinning. 10.4 cm in length. No hydronephrosis. Possible tiny nonobstructing calculi.   LEFT KIDNEY: Mild cortical thinning. 10.4 cm in length. No hydronephrosis. A few cortical cysts are noted including a 16 mm cyst in the interpolar cortex demonstrating a thin septation.   BLADDER: Nondistended and not visualized.       Mild bilateral renal cortical thinning. No hydronephrosis identified.   MACRO: None   Signed by: Nestor Damon 10/24/2023 7:37 AM Dictation workstation:   [id number]       Assessment/Plan     Mrs. Kira Solo is a 73yoF with PMH significant for A-fib on Xarelto s/p pacer due to history of bifascicular block, CKD 4 (baseline CR 1.5-1.9), IDDM-2, HLD, HTN,  history of MV  endocarditis (group B strep), lacunar CVA history of cerebral aneurysm and meningioma.  Patient presented to the ED on 10/21/2023 for nonbloody watery diarrhea along with diffuse desquamative rash sparing palms, soles, face or mucus membranes. Recently admitted to [facility] for acute on chronic RLE cellulitis due to Pseudomonas, given 1 dose of vanc/zosy, and discharged on p.o. amoxicillin + ciprofloxacin. Workup found oliguric ALEKSEY on CKD4 as well as blood culture growing S. Hominis and S. Epidermidis in 1 vial, repeat blood cultures are negative. Virtual derm consult was completed, recommending prednisone 40mg daily. ID consulted for concern for bacteremia and diffuse desquamative rash.    Drug rash due to amoxicillin  Suspected AIN from amoxicillin  Contaminated blood culture  -Pt has true rash allergy to penicillins, especially amoxicillin. She is to not receive amoxicillin again.  -Rash is currently in expected healing  stage as evidenced by mild itching, no pain and skin peeling  -No need for further antibiotics, can discontinue all  -Agree w/ prednisone as well as nephrology following for AIN    Assessment and plan discussed with attending.   Fernanda Baum, DO

## 2023-10-25 NOTE — PROGRESS NOTES
Vancomycin Dosing by Pharmacy- Cessation of Therapy    Consult to pharmacy for vancomycin dosing has been discontinued by the prescriber, pharmacy will sign off at this time.    Please call pharmacy if there are further questions or re-enter a consult if vancomycin is resumed.     Alma Rosa Stanford, PharmD

## 2023-10-25 NOTE — NURSING NOTE
Pt has uneventful night with no acute changes. Vitals remained stable and pt had no complaints of pain. Pt in bed with call light in reach.

## 2023-10-25 NOTE — NURSING NOTE
Patient stable this shift Dressing to r lower leg changed patient tolerated well. Picture updated in chart.. patient started on lr for evaluated bun levels. Patient denies any pain or discomfort. Patient had 3 episodes of loose stools this shift, mirlax discontinued by provider.  Patient refused dinner tonight.

## 2023-10-25 NOTE — PROGRESS NOTES
Kira Solo is a 73 y.o. female on day 3 of admission presenting with Acute renal failure, unspecified acute renal failure type (CMS/HCC).    Subjective   Patient seen and examined at bedside this morning, reports no overnight events. Last BM was last evening, yellow and semiformed. Her arms and legs appear improved from day prior. Her face is beginning to peel. She denies any skin pain or discomfort other than it being mildly itchy. Her appetite is improving. She does state that she had some type of penicillin in the past when she was younger which resulted in a similar skin-peeling episode.        Objective     Physical Exam  Constitutional:       General: She is awake.      Appearance: Normal appearance.   HENT:      Head: Normocephalic and atraumatic.      Mouth/Throat:      Comments: No oral ulcers or rash involving oral mucosa.  Eyes:      General: No scleral icterus.     Extraocular Movements: Extraocular movements intact.      Conjunctiva/sclera:      Right eye: Right conjunctiva is not injected.      Left eye: Left conjunctiva is not injected.   Neck:      Thyroid: No thyromegaly.   Cardiovascular:      Rate and Rhythm: Normal rate and regular rhythm.      Heart sounds: Normal heart sounds.   Pulmonary:      Effort: Pulmonary effort is normal.      Breath sounds: Normal breath sounds.   Abdominal:      General: Abdomen is protuberant. Bowel sounds are normal.      Palpations: Abdomen is soft.      Tenderness: There is no abdominal tenderness.   Musculoskeletal:      Cervical back: Full passive range of motion without pain and neck supple.      Comments: Left upper extremity swelling and erythema compared to right, interval increase from yesterday. IV in place above the left antecubital fossa.   Skin:     Findings: Rash present.      Comments: Warm and dry, diffuse erythematous nonblanching exfoliative rash encompassing the entire body sparing palms, soles, but extending to neck and face, improved from  "day prior. Decreased redness from day prior.   Neurological:      General: No focal deficit present.      Mental Status: She is alert and oriented to person, place, and time. Mental status is at baseline.   Psychiatric:         Attention and Perception: Attention normal.         Mood and Affect: Mood normal.         Speech: Speech normal.         Behavior: Behavior is cooperative.         Last Recorded Vitals  Blood pressure 125/70, pulse 84, temperature 36.1 °C (97 °F), temperature source Temporal, resp. rate 16, height 1.48 m (4' 10.27\"), weight 84 kg (185 lb 3 oz), SpO2 95 %.  Intake/Output last 3 Shifts:  I/O last 3 completed shifts:  In: 2568.3 (30.6 mL/kg) [P.O.:355; I.V.:1713.3 (20.4 mL/kg); IV Piggyback:500]  Out: 212 (2.5 mL/kg) [Urine:200 (0.1 mL/kg/hr); Stool:12]  Weight: 84 kg     Relevant Results  Scheduled medications  amLODIPine, 2.5 mg, oral, Daily  aspirin, 81 mg, oral, Daily  brimonidine, 1 drop, Both Eyes, BID  clindamycin, 900 mg, intravenous, q8h  ezetimibe, 10 mg, oral, Daily  famotidine, 10 mg, oral, Every other day  ferrous sulfate, 65 mg of iron, oral, Daily  gentamicin, , Topical, Daily  heparin (porcine), 5,000 Units, subcutaneous, q8h  insulin glargine, 20 Units, subcutaneous, Nightly  insulin lispro, 0-10 Units, subcutaneous, TID with meals  latanoprost, 1 drop, Both Eyes, Daily  metoprolol succinate XL, 25 mg, oral, Daily  perflutren lipid microspheres, 0.5-10 mL of dilution, intravenous, Once in imaging  polyethylene glycol, 17 g, oral, Daily      Continuous medications       PRN medications  PRN medications: dextrose 10 % in water (D10W), dextrose, glucagon, vancomycin  Results from last 7 days   Lab Units 10/24/23  0624 10/23/23  1040 10/22/23  1408   WBC AUTO x10*3/uL 22.6* 31.7* 29.8*   RBC AUTO x10*6/uL 3.78* 3.68* 3.85*   HEMOGLOBIN g/dL 11.0* 10.6* 11.4*       Results from last 7 days   Lab Units 10/24/23  1149 10/24/23  0624 10/23/23  1040 10/22/23  1436 10/22/23  1408 " 10/21/23  1841   SODIUM mmol/L 135* 137 135* 135* 134* 136   POTASSIUM mmol/L 4.5 4.9 4.9 5.0 5.4* 5.0   CHLORIDE mmol/L 100 102 106 105 104 104   CO2 mmol/L 16* 19* 17* 15* 15* 21   BUN mg/dL 97* 92* 78* 63* 63* 57*   CREATININE mg/dL 5.25* 5.30* 5.60* 5.18* 5.25* 5.32*   CALCIUM mg/dL 8.6 8.6 8.7 8.8 8.8 9.0   PHOSPHORUS mg/dL  --  5.9* 5.6* 5.5*  --   --    MAGNESIUM mg/dL  --  2.27  --   --  2.12  --    BILIRUBIN TOTAL mg/dL 0.3  --   --   --  0.4 0.4   ALT U/L 22  --   --   --  21 21   AST U/L 22  --   --   --  29 31         No results found.     Assessment/Plan   Principal Problem:    Acute renal failure, unspecified acute renal failure type (CMS/HCC)  Active Problems:    Essential (primary) hypertension    Candidiasis    Cardiac pacemaker in situ    Venous insufficiency (chronic) (peripheral)    Stage 3 chronic kidney disease (CMS/HCC)    Type 2 diabetes mellitus with unspecified complications (CMS/HCC)    Hyperlipidemia, unspecified    Obesity, unspecified    Obstructive sleep apnea syndrome    Chronic atrial fibrillation (CMS/HCC)    Patient is a 73-year-old female with PMH significant for A-fib (on Xarelto, history of bifascicular block), CKD 4 (baseline CR 1.5-1.9), IDDM-2, HLD, HTN,  history of MV  endocarditis (group B strep), lacunar CVA history of cerebral aneurysm and meningioma.  Patient presented to the ED on 10/22/2023 for increasing bouts of diarrhea along with diffuse rash.  Of note patient was recently admitted to [facility], and was discharged on p.o. amoxicillin + ciprofloxacin for right lower extremity wound infection with Pseudomonas.  Incidentally found in the ED via labs, she appeared to be be in acute renal failure with creatinine of 5.32, from baseline 1.5.  Patient was admitted to medicine for evaluation and management of acute renal failure in setting of diarrheal illness and extensive rash with suspicion for C. difficile and Cordero-Canelo like syndrome/drug-induced skin reaction.  Dermatology tele-consult preliminarily recommends starting prednisone 40 mg once daily, pending further evaluation and recommendations.     #Acute renal failure likely prerenal in nature versus AIN, improving  - Patient's creatinine improving slowly. Baseline 1.5-1.9.  - IVF (LR) was started initially, then was replaced with bicarb at 100 mL/hour (considering low bicarb).  -Nephrology: no HD at this time, will continue with fluids and supportive care given her renal fxn has showed signs of improvement.   - Avoid all nephrotoxic agents.  - Continue to trend RFP.     #Exfoliative dermatitis/erythroderma, concern for SJS like syndrome versus drug-induced dermatitis/rash, AGEP (acute generalized exanthematous pustulosis), stable  - Patient recently discharged home on 10/18/2023 with amoxicillin and ciprofloxacin, concern for drug-induced exanthems.  - Allopurinol held given associations with drug induced dermatitis. Augmentin, ciprofloxacin discontinued on admission as well.  - Dermatology telehealth consulted, preliminarily recommending 40 mg prednisone once daily pending further evaluation and recommendations.  - Gentamicin cream to be applied to legs bilaterally.  - Famotidine is to be dosed 10 mg every other day in setting of acute renal failure  - 125 mg Solu-Medrol was given twice (last 10/23/23)  - Continue to monitor clinical picture, hemodynamic stability.  - Will obtain LUE Duplex US given interval worsening of her left upper extremity swelling. IV in place above the left antecubital fossa near the bicep. She denies any pain.     #Gram positive cocci bacteremia  #Leukocytosis, improving  - B/c from 10/21 growing GPC, staph epidermidis and hominis, possible contamination.   - Will start clindamycin 900 mg q8h   - Infectious disease consult, appreciate recommendations, concern for disseminated staphylococcal infection.  - Daily CBC, BMP.    #Diarrhea likely 2/2 adverse drug reaction, improving  - Patient  presents to the ED complaining of multiple bouts of diarrhea approximately every 30 minutes since discharge.  - C. Diff panel negative, stool not watery, decreased frequency.  - Will discontinue vancomycin given above.   - Will start patient on lactose free diet to see if she has some degree of lactose intolerance contributing to her diarrhea.   - Will give her imodium for her loose stool, no evidence of infectious colitis.     #Cellulitis of right lower extremity: Chronic, follows with Dr. Cain   - Wound approximately 8 x 8 cm.  - Saw Dr. Cain on 10/17/23 for wound debridement.  - Wound cultures from last admission illustrating a blunted Pseudomonas growth multidrug-resistant.  - Holding antibiotics given above rash. Discussed with pharmacy who agreed.  - Wound ostomy nurse consulted.  - ID consulted      #IDDM 2:    - Mild SSI  - Increasing Lantus to 20 units given nightly given she has been on solumedrol and will be starting prednisone.    #HTN:   #A-fib/bifascicular block:   -Electronically atrially paced  #HLD:   #GERD:   -Continue home medications     IVF: None at this time  Diet: Diabetic + renal + lactose free  DVT prophylaxis: Heparin subcu  Dispo: Anticipate 3-4 days hospital stay  Consults: Nephrology, Dermatology, Infectious Disease, PT/OT     CODE STATUS: DNR/DNI no ICU    This is a preliminary note, please await final signature from attending physician.    Gibran Georges, MS4  Internal Medicine    ----------------------------------------------------------  I saw this patient with the above medical student and performed my own History and Physical Examination.   My Subjective and Objective findings are reflected in the above documentation.  The Assessment and Plan reflect my input. My Edits are included in the above documentation.  Patient was evaluated today, rash seems to be improving, nephrology following (no indication for hemodialysis at this point considering improvements of her RFP's), patient  started on IVF LR at 100 mL/hr per nephro.  Patient was evaluated by ID who believes the exfoliating rash is due to amoxicillin, suspected AIN from amoxicillin with expected healing stage, no need for antibiotics (blood culture is likely contaminated).  We will continue with prednisone as well.  LUE venous Doppler X was ordered to rule out DVT.    Discussed with attending,  Jl Kitchen M.D. PGY-2  Internal Medicine    This note has been transcribed using Dragon voice recognition system and there is a possibility of unintentional typing misprints.  Any information found to be copied from previous providers is done in the best interest of the patient to provide accurate, quality, and continuity of care.

## 2023-10-25 NOTE — PROGRESS NOTES
INPATIENT NEPHROLOGY CONSULT PROGRESS NOTE      Patient Name: Kira Solo MRN: [id number]  DATE of SERVICE: October 25, 2023  TIME of SERVICE: 09:34 AM  CONSULTING SERVICE: Nephrology    REASON for CONSULT: ALEKSEY, infectious related ATN, AIN     INTERVAL HISTORY:   Seen at bedside, feeling better  Denies dyspnea. No uremic sx  Diarrhea: 2 BM today   Scr down to 4.8 mg/dl, BUN increased to 100     SUMMARY OF STAY:  73-year-old female with multiple comorbidities including chronic kidney disease stage IIIb/IV follows with Dr. Porras, history of paroxysmal atrial fibrillation, hypertension, coronary artery disease, type 2 diabetes complicated with diabetic nephropathy, history of mitral valve endocarditis in 2017, meningioma resection in 2014, CVA with lacunar infarct, septic emboli.  Chronic lymphedema for which patient uses lymphedema pump.     Baseline serum creatinine 1.7 mg deciliter with EGFR of 30 as of July 2023     Presented to [facility] for further evaluation of worsening diarrhea along with diffuse skin rash, and her podiatry was concerned about worsening right foot wound.  Patient reports that she was using lymphedema pump and noted fluid leaking along the side of her leg patient cannot bend it due to limited mobility however when she saw her podiatry he was concerned about the right leg being infected and he advised her to be admitted.  Patient reports that she has a history of psoriasis initially thought the diffuse skin erythema was related to underlying psoriasis.     Labs on presentation demonstrated a creatinine 5.6 mg/dL with a BUN of 78 and GFR 70 mill per minute per 1.73 m² with concomitant metabolic acidosis bicarb 16.  Patient received volume with repeat creatinine 5.3 and BUN up to 97 EGFR 80 mill per minute.  Glucose 332.    ASSESSMENT:  1.  Acute kidney injury superimposed on underlying chronic kidney disease stage IV, acute  kidney injury multifactorial component of diarrhea, infectious related due to right lower extremity cellulitis, suspected AIN due to diffuse disseminated rash.  Patient with underlying autoimmune disease including psoriasis and glomerulonephritis is also in the differential diagnosis.  Diarrhea improving.      Serology work-up in process, patient with high inflammatory markers elevated ESR of 83 CRP of 12.7.     2.  Profound metabolic acidosis possibly due to uremic acidemia and diarrhea to cont sodium bicarbonate     3.  Advanced chronic kidney disease stage IV, diabetic nephropathy     4.  Hypertension     5.  Complicated cardiac history including prior history of infective endocarditis affecting mitral valve, atrial fibrillation on chronic anticoagulation, coronary artery disease     6.  Prior history of CVA with septic emboli     7.  Lymphedema requiring lymphedema pump     8.  Psoriasis     Plan:  Oliguric severe acute kidney injury superimposed on advanced chronic kidney disease, Scr improving however BUN up trending. Pt can resume diet for today.  No indication for renal replacement therapy, will cont to monitor the need on daily bases.   Volume status optimized resuscitated with LR, to resume LR at 100 ml/hr.   To add sodium bicarb 650 mg po TID.   To cont to dose vancomycin to daily trough    We will continue to monitor closely with you, thank you!      Medications:    Current Facility-Administered Medications:     amLODIPine (Norvasc) tablet 2.5 mg, 2.5 mg, oral, Daily, Jl Kitchen MD, 2.5 mg at 10/25/23 0852    aspirin EC tablet 81 mg, 81 mg, oral, Daily, Jl Kitchen MD, 81 mg at 10/25/23 0850    brimonidine (AlphaGAN) 0.2 % ophthalmic solution 1 drop, 1 drop, Both Eyes, BID, Jl Kitchen MD, 1 drop at 10/25/23 0852    dextrose 10 % in water (D10W) infusion, 0.3 g/kg/hr, intravenous, Once PRN, Jl Kitchen MD    dextrose 50 % injection 25 g, 25 g, intravenous, q15 min PRN, Jl Kitchen MD     ezetimibe (Zetia) tablet 10 mg, 10 mg, oral, Daily, Jl Kitchen MD, 10 mg at 10/25/23 0851    famotidine (Pepcid) tablet 10 mg, 10 mg, oral, Every other day, Jl Kitchen MD, 10 mg at 10/25/23 0850    ferrous sulfate 325 (65 Fe) MG tablet 65 mg of iron, 65 mg of iron, oral, Daily, Jl Kitchen MD, 65 mg of iron at 10/25/23 0852    gentamicin (Garamycin) 0.1 % cream, , Topical, Daily, Jl Kitchen MD, Given at 10/25/23 0849    glucagon (Glucagen) injection 1 mg, 1 mg, intramuscular, q15 min PRN, Jl Kitchen MD    heparin (porcine) injection 5,000 Units, 5,000 Units, subcutaneous, q8h, Jl Kitchen MD, 5,000 Units at 10/25/23 1314    insulin glargine (Lantus) injection 20 Units, 20 Units, subcutaneous, Nightly, Jl Kitchen MD, 20 Units at 10/24/23 2116    insulin lispro (HumaLOG) injection 0-10 Units, 0-10 Units, subcutaneous, TID with meals, Jl Kitchen MD, 6 Units at 10/25/23 1315    lactated Ringer's infusion, 100 mL/hr, intravenous, Continuous, Jl Kitchen MD, Last Rate: 100 mL/hr at 10/25/23 1634, 100 mL/hr at 10/25/23 1634    latanoprost (Xalatan) 0.005 % ophthalmic solution 1 drop, 1 drop, Both Eyes, Daily, Jl Kitchen MD, 1 drop at 10/25/23 0853    loperamide (Imodium) capsule 2 mg, 2 mg, oral, 4x daily PRN, Abhijit Gutierrez DO    metoprolol succinate XL (Toprol-XL) 24 hr tablet 25 mg, 25 mg, oral, Daily, Jl Kitchen MD, 25 mg at 10/25/23 0851    perflutren lipid microspheres (Definity) injection 0.5-10 mL of dilution, 0.5-10 mL of dilution, intravenous, Once in imaging, Abhijit Gutierrez DO    predniSONE (Deltasone) tablet 40 mg, 40 mg, oral, Daily, Abhijit Gutierrez DO, 40 mg at 10/25/23 1314    PERTINENT ROS:  GENERAL:  positive for fatigue, poor appetite.  No fever/chills  RESPIRATORY:  positive for shortness of breath.  Negative for cough, wheezing.  CARDIOVASCULAR:   Negative for chest pain or palpitation.  GI:  + for abdominal pain, diarrhea  : + oliguria     Physical Exam:  Vital  signs in last 24 hours:  Temp:  [36 °C (96.8 °F)-36.4 °C (97.5 °F)] 36 °C (96.8 °F)  Heart Rate:  [70-84] 71  Resp:  [16] 16  BP: (125-142)/(62-70) 141/70    General: Awake, cooperative, not in acute distress  HEENT:  NCAT,  mucous membranes moist and pink  NECK:  no JVD, no carotid bruit, supple, no cervical mass or thyromegaly  LUNGS;  Diminished breath sounds, no Rales  CV:  Distant, regular rate and rhythm, no murmurs  ABDOMEN:  abdomen soft, nontender, BS normal, no masses or organomegaly  EDEMA: multiple leg deformities. Chronic lymphedema better now.   SKIN:  + diffuse erythematous rash.      Intake/Output last 3 shifts:  I/O last 3 completed shifts:  In: 3398.3 (40.5 mL/kg) [P.O.:1135; I.V.:1713.3 (20.4 mL/kg); IV Piggyback:550]  Out: 512 (6.1 mL/kg) [Urine:500 (0.2 mL/kg/hr); Stool:12]  Weight: 83.9 kg     DATA:  Diagnotic tests reviewed for Todays visit:  Results from last 7 days   Lab Units 10/25/23  0720   WBC AUTO x10*3/uL 18.2*   RBC AUTO x10*6/uL 3.57*   HEMOGLOBIN g/dL 10.3*   HEMATOCRIT % 32.4*     Results from last 7 days   Lab Units 10/25/23  0720 10/24/23  1149   SODIUM mmol/L 139 135*   POTASSIUM mmol/L 4.7 4.5   CHLORIDE mmol/L 103 100   CO2 mmol/L 21 16*   BUN mg/dL 100* 97*   CREATININE mg/dL 4.81* 5.25*   CALCIUM mg/dL 8.1* 8.6   PHOSPHORUS mg/dL 6.1*  --    MAGNESIUM mg/dL 2.38  --    BILIRUBIN TOTAL mg/dL  --  0.3   ALT U/L  --  22   AST U/L  --  22     Results from last 7 days   Lab Units 10/22/23  1019   COLOR U  Kellie*   APPEARANCE U  Hazy*   PH U  5.0   SPEC GRAV UR  1.018   PROTEIN U mg/dL 30 (1+)*   BLOOD UR  NEGATIVE   NITRITE U  NEGATIVE   WBC UR /HPF 6-10*   BACTERIA UR /HPF 2+*     IMAGING: CXR reviewed in [facility] images      SIGNATURE: Morales Perry MD  Nephrology and Hypertension  [address] [place]  Office phone: [phone]  FAX: [phone]    This note was partially generated using the Dragon voice recognition system, and there may be some incorrect words,  spelling's and punctuation that were not noted in checking the note before saving.

## 2023-10-26 NOTE — PROGRESS NOTES
Physical Therapy    Physical Therapy Treatment    Patient Name: Kira Solo  MRN: [id number]  Today's Date: 10/26/2023  Time Calculation  Start Time: 1120  Stop Time: 1148  Time Calculation (min): 28 min       Assessment/Plan   PT Assessment  PT Assessment Results: Decreased strength, Decreased endurance, Impaired balance, Decreased mobility  Rehab Prognosis: Good  End of Session Communication: Bedside nurse  End of Session Patient Position: Up in chair, Alarm off, not on at start of session  PT Plan  Inpatient/Swing Bed or Outpatient: Inpatient  PT Plan  Treatment/Interventions: Bed mobility  PT Plan: Skilled PT  PT Frequency: 3 times per week  PT Discharge Recommendations: Moderate intensity level of continued care  PT Recommended Transfer Status: Assist x1     10/26/23 1120   PT  Visit   PT Received On 10/26/23   Response to Previous Treatment Patient with no complaints from previous session.   General   Family/Caregiver Present Yes   Prior to Session Communication Bedside nurse   Patient Position Received Bed, 3 rail up   Preferred Learning Style verbal;visual   Precautions   Medical Precautions Fall precautions   Pain Assessment   Pain Assessment 0-10   Pain Score 0 - No pain   Cognition   Overall Cognitive Status WFL   Orientation Level Oriented X4   Therapeutic Exercise   Therapeutic Exercise Performed Yes   Therapeutic Exercise Activity 1 marches x15   Therapeutic Exercise Activity 2 PF/DF x15   Therapeutic Exercise Activity 3 LAQ x15   Therapeutic Exercise Activity 4 Pillow squeeze x15   Therapeutic Activity   Therapeutic Activity Performed Yes   Balance/Neuromuscular Re-Education   Balance/Neuromuscular Re-Education Activity Performed Yes   Ambulation/Gait Training   Ambulation/Gait Training Performed Yes   Ambulation/Gait Training 1   Surface 1 Level tile   Device 1 Rolling walker   Gait Support Devices Gait belt   Assistance 1 Contact guard   Quality of Gait 1 Wide base of support   Comments/Distance  (ft) 1 65' x2, waddling gait   Transfers   Transfer Yes   Transfer 1   Transfer From 1 Bed to   Transfer to 1 Stand   Technique 1 Sit to stand   Transfer Device 1 Walker;Gait belt   Transfer Level of Assistance 1 Minimum assistance   Trials/Comments 1 x1   Activity Tolerance   Endurance Tolerates 30 min exercise with multiple rests   PT Assessment   PT Assessment Results Decreased strength;Decreased endurance;Impaired balance;Decreased mobility   Rehab Prognosis Good   End of Session Communication Bedside nurse   End of Session Patient Position Up in chair;Alarm off, not on at start of session   Education   Individual(s) Educated Patient   Education Provided Fall Risk   Patient/Caregiver Demonstrated Understanding yes   Plan of Care Discussed and Agreed Upon yes   Patient Response to Education Patient/Caregiver Verbalized Understanding of Information   PT Plan   Inpatient/Swing Bed or Outpatient Inpatient   PT Plan   Treatment/Interventions Bed mobility   PT Plan Skilled PT   PT Frequency 3 times per week   PT Discharge Recommendations Moderate intensity level of continued care   PT Recommended Transfer Status Assist x1     Outcome Measures:  [facility] Basic Mobility  Turning from your back to your side while in a flat bed without using bedrails: A little  Moving from lying on your back to sitting on the side of a flat bed without using bedrails: A little  Moving to and from bed to chair (including a wheelchair): A little  Standing up from a chair using your arms (e.g. wheelchair or bedside chair): A little  To walk in hospital room: A little  Climbing 3-5 steps with railing: A lot  Basic Mobility - Total Score: 17  Education Documentation  Mobility Training, taught by Maxime Fernando PTA at 10/26/2023 11:55 AM.  Learner: Patient  Readiness: Acceptance  Method: Explanation  Response: Verbalizes Understanding    Mobility Training, taught by Maxime Fernando PTA at 10/25/2023  2:48 PM.  Learner: Family, Patient  Readiness:  Acceptance  Method: Explanation, Demonstration  Response: Verbalizes Understanding, Needs Reinforcement    Education Comments  No comments found.        EDUCATION:  Education  Individual(s) Educated: Patient  Education Provided: Fall Risk  Patient/Caregiver Demonstrated Understanding: yes  Plan of Care Discussed and Agreed Upon: yes  Patient Response to Education: Patient/Caregiver Verbalized Understanding of Information    GOALS:  Encounter Problems       Encounter Problems (Active)       PT Problem       PT Goal 1 (Progressing)       Start:  10/24/23    Expected End:  11/07/23       Pt able to perform bed mobility with CGA assist.           PT Goal 2 (Progressing)       Start:  10/24/23    Expected End:  11/07/23       Pt able to complete all transfers with mod assist of 1.            PT Goal 3 (Progressing)       Start:  10/24/23    Expected End:  11/07/23       Pt able to ambulate 25 feet with front wheeled walker and CGA.              Safety       STG - Patient uses call light consistently to request assistance with transfers (Progressing)       Start:  10/22/23    Expected End:  10/29/23

## 2023-10-26 NOTE — CARE PLAN
Pt A&Ox4, VSS, afebrile throughout shift. Pt with generalized, dry, flaky skin noted. No tele, on room air. Pt moving appropriately in bed. Currently resting in bed, lowest and locked position, with call bell in reach. Will continue to monitor for remainder of shift.         Problem: Discharge Barriers  Goal: My discharge needs are met  Outcome: Progressing     Problem: Skin  Goal: Decreased wound size/increased tissue granulation at next dressing change  Outcome: Progressing  Goal: Prevent/minimize sheer/friction injuries  Outcome: Progressing  Goal: Promote/optimize nutrition  Outcome: Progressing  Goal: Promote skin healing  Outcome: Progressing

## 2023-10-26 NOTE — PROGRESS NOTES
INPATIENT NEPHROLOGY CONSULT PROGRESS NOTE      Patient Name: Kira Solo MRN: [id number]  DATE of SERVICE: October 26, 2023  TIME of SERVICE: 09:27 AM  CONSULTING SERVICE: Nephrology    REASON for CONSULT: ALEKSEY, infectious related ATN    INTERVAL HISTORY:   No indication for dialysis   To cont IVF    SUMMARY OF STAY:  73-year-old female with multiple comorbidities including chronic kidney disease stage IIIb/IV follows with Dr. Porras, history of paroxysmal atrial fibrillation, hypertension, coronary artery disease, type 2 diabetes complicated with diabetic nephropathy, history of mitral valve endocarditis in 2017, meningioma resection in 2014, CVA with lacunar infarct, septic emboli.  Chronic lymphedema for which patient uses lymphedema pump.     Baseline serum creatinine 1.7 mg deciliter with EGFR of 30 as of July 2023     Presented to [facility] for further evaluation of worsening diarrhea along with diffuse skin rash, and her podiatry was concerned about worsening right foot wound.  Patient reports that she has a history of psoriasis initially thought the diffuse skin erythema was related to underlying psoriasis.     Labs on presentation demonstrated a creatinine 5.6 mg/dL with a BUN of 78 and GFR 7 mill per minute per 1.73 m² with concomitant metabolic acidosis bicarb 16.  Patient received volume with repeat creatinine 5.3 and BUN up to 97 EGFR 8 mill per minute.  Glucose 332.    ASSESSMENT:  1.  Acute kidney injury superimposed on underlying chronic kidney disease stage IV, acute kidney injury multifactorial component of diarrhea, infectious related due to right lower extremity cellulitis,   Patient with underlying autoimmune disease including psoriasis.  Diarrhea improving.     -C3, C4 within acceptable range.  -Suspected disseminated allergic reaction due to erythematous rash IgE elevated 1270  -Aldolase level elevated at 9.4    Protein  to creatinine ratio 0.5 mg/g    Urea Nitrogen  6 - 23 mg/dL 102 High Panic  100 High Panic  97 High Panic  CM 92 High Panic          Creatinine  0.50 - 1.05 mg/dL 4.09 High  4.81 High  5.25 High  5.30 High      Serum creatinine down to 4 mg deciliter from 5.3 on presentation.  However BUN still elevated at 102  Urine output 550 cc    Advanced chronic kidney disease however due to lack of uremic symptoms no urgent indication for renal replacement therapy    2.  Profound metabolic acidosis possibly due to uremic acidemia and diarrhea to cont sodium bicarbonate     3.  Advanced chronic kidney disease stage IV, diabetic nephropathy     4.  Hypertension     5.  Complicated cardiac history including prior history of infective endocarditis affecting mitral valve, atrial fibrillation on chronic anticoagulation, coronary artery disease     6.  Prior history of CVA with septic emboli     7.  Lymphedema requiring lymphedema pump     8.  Psoriasis     Plan:  No indication for dialysis   To cont IVF, lactated Ringer at 100 cc/h for 1 more liter  Oliguric severe acute kidney injury superimposed on advanced chronic kidney disease, Scr improving  To cont sodium bicarb 650 mg po TID.   To cont to dose vancomycin to daily trough    We will continue to monitor closely with you, thank you!      Medications:    Current Facility-Administered Medications:     amLODIPine (Norvasc) tablet 2.5 mg, 2.5 mg, oral, Daily, Jl Kitchen MD, 2.5 mg at 10/25/23 0852    aspirin EC tablet 81 mg, 81 mg, oral, Daily, Jl Kitchen MD, 81 mg at 10/25/23 0850    brimonidine (AlphaGAN) 0.2 % ophthalmic solution 1 drop, 1 drop, Both Eyes, BID, Jl Kitchen MD, 1 drop at 10/25/23 2029    dextrose 10 % in water (D10W) infusion, 0.3 g/kg/hr, intravenous, Once PRN, Jl Kitchen MD    dextrose 50 % injection 25 g, 25 g, intravenous, q15 min PRN, Jl Kitchen MD    ezetimibe (Zetia) tablet 10 mg, 10 mg, oral, Daily, Jl Kitchen MD, 10 mg at 10/25/23  0851    famotidine (Pepcid) tablet 10 mg, 10 mg, oral, Every other day, Jl Kitchen MD, 10 mg at 10/25/23 0850    ferrous sulfate 325 (65 Fe) MG tablet 65 mg of iron, 65 mg of iron, oral, Daily, Jl Kitchen MD, 65 mg of iron at 10/25/23 0852    gentamicin (Garamycin) 0.1 % cream, , Topical, Daily, Jl Kitchen MD, Given at 10/25/23 0849    glucagon (Glucagen) injection 1 mg, 1 mg, intramuscular, q15 min PRN, Jl Kitchen MD    heparin (porcine) injection 5,000 Units, 5,000 Units, subcutaneous, q8h, Jl Kitchen MD, 5,000 Units at 10/26/23 0427    insulin glargine (Lantus) injection 20 Units, 20 Units, subcutaneous, Nightly, Jl Kitchen MD, 20 Units at 10/25/23 2029    insulin lispro (HumaLOG) injection 0-10 Units, 0-10 Units, subcutaneous, TID with meals, Jl Kitchen MD, 6 Units at 10/25/23 1315    lactated Ringer's infusion, 100 mL/hr, intravenous, Continuous, Jl Kitchen MD, Last Rate: 100 mL/hr at 10/26/23 0427, 100 mL/hr at 10/26/23 0427    latanoprost (Xalatan) 0.005 % ophthalmic solution 1 drop, 1 drop, Both Eyes, Daily, Jl Kitchen MD, 1 drop at 10/25/23 0853    loperamide (Imodium) capsule 2 mg, 2 mg, oral, 4x daily PRN, Abhijit Gutierrez DO    metoprolol succinate XL (Toprol-XL) 24 hr tablet 25 mg, 25 mg, oral, Daily, Jl Kitchen MD, 25 mg at 10/25/23 0851    perflutren lipid microspheres (Definity) injection 0.5-10 mL of dilution, 0.5-10 mL of dilution, intravenous, Once in imaging, Abhijti Gutierrez DO    predniSONE (Deltasone) tablet 40 mg, 40 mg, oral, Daily, Abhijit Gutierrez DO, 40 mg at 10/25/23 1314    PERTINENT ROS:  GENERAL:  positive for fatigue, poor appetite.  No fever/chills  RESPIRATORY:  positive for shortness of breath.  Negative for cough, wheezing.  CARDIOVASCULAR:   Negative for chest pain or palpitation.  GI:  + for abdominal pain, diarrhea  : + oliguria     Physical Exam:  Vital signs in last 24 hours:  Temp:  [36 °C (96.8 °F)-36.6 °C (97.9 °F)] 36.6 °C (97.9  °F)  Heart Rate:  [69-71] 71  Resp:  [16-18] 18  BP: (118-141)/(64-74) 118/74    General: Awake, cooperative, not in acute distress  HEENT:  NCAT,  mucous membranes moist and pink  NECK:  no JVD, no carotid bruit, supple, no cervical mass or thyromegaly  LUNGS;  Diminished breath sounds, no Rales  CV:  Distant, regular rate and rhythm, no murmurs  ABDOMEN:  abdomen soft, nontender, BS normal, no masses or organomegaly  EDEMA: multiple leg deformities. Chronic lymphedema better now.   SKIN:  + diffuse erythematous rash.      Intake/Output last 3 shifts:  I/O last 3 completed shifts:  In: 2275 (27.1 mL/kg) [P.O.:1375; I.V.:350 (4.2 mL/kg); IV Piggyback:550]  Out: 310 (3.7 mL/kg) [Urine:300 (0.1 mL/kg/hr); Stool:10]  Weight: 83.9 kg     DATA:  Diagnotic tests reviewed for Todays visit:  Results from last 7 days   Lab Units 10/26/23  0547   WBC AUTO x10*3/uL 16.5*   RBC AUTO x10*6/uL 3.65*   HEMOGLOBIN g/dL 10.6*   HEMATOCRIT % 32.8*       Results from last 7 days   Lab Units 10/26/23  0547 10/25/23  0720 10/24/23  1149   SODIUM mmol/L 139   < > 135*   POTASSIUM mmol/L 4.1   < > 4.5   CHLORIDE mmol/L 106   < > 100   CO2 mmol/L 22   < > 16*   BUN mg/dL 102*   < > 97*   CREATININE mg/dL 4.09*   < > 5.25*   CALCIUM mg/dL 8.0*   < > 8.6   PHOSPHORUS mg/dL 5.8*   < >  --    MAGNESIUM mg/dL 2.38   < >  --    BILIRUBIN TOTAL mg/dL  --   --  0.3   ALT U/L  --   --  22   AST U/L  --   --  22    < > = values in this interval not displayed.       Results from last 7 days   Lab Units 10/22/23  1019   COLOR U  Kellie*   APPEARANCE U  Hazy*   PH U  5.0   SPEC GRAV UR  1.018   PROTEIN U mg/dL 30 (1+)*   BLOOD UR  NEGATIVE   NITRITE U  NEGATIVE   WBC UR /HPF 6-10*   BACTERIA UR /HPF 2+*       IMAGING: CXR reviewed in [facility] images      SIGNATURE: Morales Perry MD  Nephrology and Hypertension  [address] [place]  Office phone: [phone]  FAX: [phone]    This note was partially generated using the Dragon voice  recognition system, and there may be some incorrect words, spelling's and punctuation that were not noted in checking the note before saving.

## 2023-10-26 NOTE — CARE PLAN
The patient's goals for the shift include  Problem: Skin  Goal: Prevent/minimize sheer/friction injuries  10/26/2023 1849 by Avril Whitley RN  Outcome: Met  10/26/2023 1044 by Avril Whitley RN  Flowsheets (Taken 10/26/2023 1044)  Prevent/minimize sheer/friction injuries: Increase activity/out of bed for meals  Goal: Promote/optimize nutrition  Outcome: Met  Goal: Promote skin healing  10/26/2023 1849 by Avril Whitley RN  Outcome: Met  10/26/2023 1044 by Avril Whitley RN  Flowsheets (Taken 10/26/2023 1044)  Promote skin healing: Assess skin/pad under line(s)/device(s)         The clinical goals for the shift include patient to partipate in care this shift    Over the shift, the patient did  make progress toward the following goals.

## 2023-10-26 NOTE — PROGRESS NOTES
Occupational Therapy    OT Treatment    Patient Name: Kira Solo  MRN: [id number]  Today's Date: 10/26/2023  Time Calculation  Start Time: 1201  Stop Time: 1225  Time Calculation (min): 24 min         Assessment:  End of Session Communication: Bedside nurse  End of Session Patient Position: Bed, 3 rail up, Alarm on       Plan:  OT Frequency: 3 times per week  OT Discharge Recommendations: Moderate intensity level of continued care     Subjective     Current Problem:  Patient Active Problem List   Diagnosis    Adrenal adenoma    Anterior mediastinal tumor    Antiphospholipid antibody positive    Balance problem    Essential (primary) hypertension    Bifascicular block    Bilateral optic nerve atrophy    Candidiasis    Cardiac pacemaker in situ    Cellulitis    Central retinal vein occlusion with macular edema of both eyes    Venous insufficiency (chronic) (peripheral)    Stage 3 chronic kidney disease (CMS/HCC)    Colon polyp    Contusion of left leg    Type 2 diabetes mellitus with unspecified complications (CMS/HCC)    Contusion of toe of left foot, initial encounter    Edema    Foot pain, bilateral    Frequent falls    Gait, antalgic    Generalized weakness    Glaucoma suspect of both eyes    Gout, unspecified    Hyperlipidemia, unspecified    Hypotension    Iron deficiency    Knee pain, left    Left knee DJD    Lymphedema, not elsewhere classified    Mitral valve disorder    Onychomycosis    Open wound of both lower extremities    Pancreatic cyst    Paroxysmal atrial fibrillation (CMS/HCC)    Psoriasis with arthropathy (CMS/HCC)    Arthritis    Rhinitis    Right homonymous hemianopsia    Sinus bradycardia    Sinus node dysfunction (CMS/HCC)    Syncope    Thymus disorder (CMS/HCC)    Unruptured cerebral aneurysm    Vitamin D deficiency    Weight loss, unintentional    Rash    Xerosis of skin    Central retinal artery occlusion, bilateral    Decreased visual acuity    Drug-induced chronic gout of multiple sites     History of panretinal photocoagulation    Gouty arthritis of toe of left foot    Lymphedema of both lower extremities    Meningioma (CMS/HCC)    Metabolic acidosis    Multiple lacunar infarcts (CMS/HCC)    Neoplasm of uncertain behavior of skin    Neovascular glaucoma, both eyes    Nonproliferative diabetic retinopathy (CMS/HCC)    Obesity, unspecified    Obstructive sleep apnea syndrome    Optic papillitis of right eye    Partial thickness burn of left lower leg    Pseudophakia    Psoriasis, unspecified    Pulsatile tinnitus of right ear    Pure hypercholesterolemia    Rosacea    Acute and subacute infective endocarditis    Systolic murmur    Vision impairment    CKD stage 3 due to type 2 diabetes mellitus (CMS/HCC)    Cerebral infarction due to embolism of unspecified cerebral artery (CMS/[facility])    Difficulty in walking, not elsewhere classified    Gastro-esophageal reflux disease without esophagitis    Muscle weakness (generalized)    Nonrheumatic mitral (valve) insufficiency    Other sequelae of cerebral infarction    Streptococcus, group b, as the cause of diseases classified elsewhere    Chronic atrial fibrillation (CMS/[facility])    Wound infection    Acute renal failure, unspecified acute renal failure type (CMS/[facility])       General:  OT Received On: 10/26/23  Family/Caregiver Present: Yes  Prior to Session Communication: Bedside nurse  Patient Position Received: Bed, 3 rail up, Alarm on (sitting EOB)    Vital Signs:       Pain:  Pain Assessment  Pain Assessment: 0-10  Pain Score: 0 - No pain  Objective      Activities of Daily Living:    Grooming  Grooming Level of Assistance: Contact guard  Grooming Where Assessed: Standing sinkside  Grooming Comments: cue for AD placement                      Bed Mobility/Transfers: Bed Mobility  Bed Mobility: Yes  Transfers  Transfer: Yes  Transfer 1  Transfer From 1: Sit to  Transfer to 1: Stand  Transfer Device 1: Walker, Gait belt  Transfer Level of Assistance 1: Minimum  assistance  Trials/Comments 1: FM performed within household distances CGA with fww.                       Outcome Measures:[facility] Daily Activity  Putting on and taking off regular lower body clothing: A lot  Bathing (including washing, rinsing, drying): A lot  Putting on and taking off regular upper body clothing: A little  Toileting, which includes using toilet, bedpan or urinal: A lot  Taking care of personal grooming such as brushing teeth: A little  Eating Meals: None  Daily Activity - Total Score: 16  Education Documentation  Body Mechanics, taught by ERIC Macedo at 10/26/2023  3:31 PM.  Learner: Patient  Readiness: Acceptance  Method: Demonstration, Explanation  Response: Demonstrated Understanding, Needs Reinforcement    ADL Training, taught by ERIC Macedo at 10/26/2023  3:31 PM.  Learner: Patient  Readiness: Acceptance  Method: Demonstration, Explanation  Response: Demonstrated Understanding, Needs Reinforcement    Education Comments  No comments found.        EDUCATION:  Education  Individual(s) Educated: Patient  Education Provided: Ergonomics and postural realignment, Joint protection and energy conservation, Fall precautons  Patient Response to Education: Patient/Caregiver Verbalized Understanding of Information    Goals:  Encounter Problems       Encounter Problems (Active)       OT Goals       Patient will complete functional transfers at MOD I  level with LRD to facilitate increased independence and safety with home going  (Progressing)       Start:  10/24/23    Expected End:  11/07/23            Patient will complete functional mobility for household distances at MOD I level with LRD to facilitate increased independence and safety with home going  (Progressing)       Start:  10/24/23    Expected End:  11/07/23            Patient will complete LB dressing at MOD I level to facilitate safety and independence for home going   (Progressing)       Start:  10/24/23     Expected End:  11/07/23            Patient will complete UB dressing at MOD I level to facilitate safety and independence for home going   (Progressing)       Start:  10/24/23    Expected End:  11/07/23            Patient will complete toileting at MOD I level to facilitate safety and independence for home going   (Progressing)       Start:  10/24/23    Expected End:  11/07/23               Safety       STG - Patient uses call light consistently to request assistance with transfers (Progressing)       Start:  10/22/23    Expected End:  10/29/23                   Rin REYES

## 2023-10-26 NOTE — PROGRESS NOTES
[facility] unable to accept d/t not having a bed available. Met with patient at bedside to update and obtain additional SNF choices. Patient asks TCC to return stating her spouse will be here at 9:30. Offered to print patient another SNF list, pt declined another list stating they already have list.     1023- Met with patient and spouse at bedside. Additional SNF choices are [facility], [facility], and [facility]. Referrals sent. Pt/spouse to decide on [facility] once responses are received from facilities.     1054- Met with patient and spouse to update that [facility] and [facility] can accept patient at d/c. Patient and spouse would like to wait to hear from [facility] and are also considering patient returning home with [facility]. Per nursing, plan with nephrology is pending labs. [facility] can accommodate onsite HD if needed.     Lin Toams RN

## 2023-10-26 NOTE — PROGRESS NOTES
"Kira Solo is a 73 y.o. female on day 4 of admission presenting with Acute renal failure, unspecified acute renal failure type (CMS/HCC).    Subjective   Patient seen and examined at bedside this morning, reports no overnight events. Last BM was yesterday after lunch, which she says \"went right through her\". Denies any blood or mucus. States that she does have an appetite. She has no new complaints.        Objective     Physical Exam  Constitutional:       General: She is awake.      Appearance: Normal appearance.   HENT:      Head: Normocephalic and atraumatic.      Mouth/Throat:      Comments: No oral ulcers or rash involving oral mucosa.  Eyes:      General: No scleral icterus.     Extraocular Movements: Extraocular movements intact.      Conjunctiva/sclera:      Right eye: Right conjunctiva is not injected.      Left eye: Left conjunctiva is not injected.   Neck:      Thyroid: No thyromegaly.   Cardiovascular:      Rate and Rhythm: Normal rate and regular rhythm.      Heart sounds: Normal heart sounds.   Pulmonary:      Effort: Pulmonary effort is normal.      Breath sounds: Normal breath sounds.   Abdominal:      General: Abdomen is protuberant. Bowel sounds are normal.      Palpations: Abdomen is soft.      Tenderness: There is no abdominal tenderness.   Musculoskeletal:      Cervical back: Full passive range of motion without pain and neck supple.      Comments: Left upper extremity swelling and erythema compared to right, slightly improved from day prior. No thrombus on US. IV now in place above the right antecubital fossa.   Skin:     Findings: Rash present.      Comments: Warm and dry, diffuse erythematous nonblanching exfoliative rash encompassing the entire body no longer sparing palms, soles and extending to neck and face, improved from day prior.    Neurological:      General: No focal deficit present.      Mental Status: She is alert and oriented to person, place, and time. Mental status is at " "baseline.   Psychiatric:         Attention and Perception: Attention normal.         Mood and Affect: Mood normal.         Speech: Speech normal.         Behavior: Behavior is cooperative.         Last Recorded Vitals  Blood pressure 118/74, pulse 71, temperature 36.6 °C (97.9 °F), temperature source Temporal, resp. rate 18, height 1.48 m (4' 10.27\"), weight 83.9 kg (185 lb), SpO2 97 %.  Intake/Output last 3 Shifts:  I/O last 3 completed shifts:  In: 2275 (27.1 mL/kg) [P.O.:1375; I.V.:350 (4.2 mL/kg); IV Piggyback:550]  Out: 310 (3.7 mL/kg) [Urine:300 (0.1 mL/kg/hr); Stool:10]  Weight: 83.9 kg     Relevant Results  Scheduled medications  amLODIPine, 2.5 mg, oral, Daily  aspirin, 81 mg, oral, Daily  brimonidine, 1 drop, Both Eyes, BID  ezetimibe, 10 mg, oral, Daily  famotidine, 10 mg, oral, Every other day  ferrous sulfate, 65 mg of iron, oral, Daily  gentamicin, , Topical, Daily  heparin (porcine), 5,000 Units, subcutaneous, q8h  insulin glargine, 20 Units, subcutaneous, Nightly  insulin lispro, 0-10 Units, subcutaneous, TID with meals  latanoprost, 1 drop, Both Eyes, Daily  metoprolol succinate XL, 25 mg, oral, Daily  perflutren lipid microspheres, 0.5-10 mL of dilution, intravenous, Once in imaging  predniSONE, 40 mg, oral, Daily      Continuous medications  lactated Ringer's, 100 mL/hr, Last Rate: 100 mL/hr (10/26/23 0427)      PRN medications  PRN medications: dextrose 10 % in water (D10W), dextrose, glucagon, loperamide  Results from last 7 days   Lab Units 10/26/23  0547 10/25/23  0720 10/24/23  0624   WBC AUTO x10*3/uL 16.5* 18.2* 22.6*   RBC AUTO x10*6/uL 3.65* 3.57* 3.78*   HEMOGLOBIN g/dL 10.6* 10.3* 11.0*       Results from last 7 days   Lab Units 10/26/23  0547 10/25/23  0720 10/24/23  1149 10/24/23  0624 10/22/23  1436 10/22/23  1408 10/21/23  1841 10/21/23  1841   SODIUM mmol/L 139 139 135* 137   < > 134*  --  136   POTASSIUM mmol/L 4.1 4.7 4.5 4.9   < > 5.4*  --  5.0   CHLORIDE mmol/L 106 103 100 102  "  < > 104  --  104   CO2 mmol/L 22 21 16* 19*   < > 15*  --  21   BUN mg/dL 102* 100* 97* 92*   < > 63*  --  57*   CREATININE mg/dL 4.09* 4.81* 5.25* 5.30*   < > 5.25*  --  5.32*   CALCIUM mg/dL 8.0* 8.1* 8.6 8.6   < > 8.8  --  9.0   PHOSPHORUS mg/dL 5.8* 6.1*  --  5.9*   < >  --   --   --    MAGNESIUM mg/dL 2.38 2.38  --  2.27  --  2.12   < >  --    BILIRUBIN TOTAL mg/dL  --   --  0.3  --   --  0.4  --  0.4   ALT U/L  --   --  22  --   --  21  --  21   AST U/L  --   --  22  --   --  29  --  31    < > = values in this interval not displayed.         No results found.     Assessment/Plan   Principal Problem:    Acute renal failure, unspecified acute renal failure type (CMS/HCC)  Active Problems:    Essential (primary) hypertension    Candidiasis    Cardiac pacemaker in situ    Venous insufficiency (chronic) (peripheral)    Stage 3 chronic kidney disease (CMS/HCC)    Type 2 diabetes mellitus with unspecified complications (CMS/[facility])    Hyperlipidemia, unspecified    Obesity, unspecified    Obstructive sleep apnea syndrome    Chronic atrial fibrillation (CMS/[facility])    Patient is a 73-year-old female with PMH significant for A-fib (on Xarelto, history of bifascicular block), CKD 4 (baseline CR 1.5-1.9), IDDM-2, HLD, HTN,  history of MV  endocarditis (group B strep), lacunar CVA history of cerebral aneurysm and meningioma.  Patient presented to the ED on 10/22/2023 for increasing bouts of diarrhea along with diffuse rash.  Of note patient was recently admitted to [facility], and was discharged on p.o. amoxicillin + ciprofloxacin for right lower extremity wound infection with Pseudomonas.  Incidentally found in the ED via labs, she appeared to be be in acute renal failure with creatinine of 5.32, from baseline 1.5. Rash likely 2/2 allergic reaction to her antibiotics. Dermatology tele-consult preliminarily recommends starting prednisone 40 mg once daily, pending further evaluation and recommendations.     #Acute renal failure  likely AIN, improving  - Patient's creatinine improving slowly. Baseline 1.5-1.9.  - Holding offending agents, abx, allopurinol.   - Receiving 40 mg prednisone.  - Nephrology:  mL/hr, no HD at this time.  - Bicarb 650 mg PO TID.  - Avoid all nephrotoxic agents.  - Continue to trend RFP.     #Exfoliative dermatitis/erythroderma likely 2/2 allergic rxn to abx, stable  #Leukocytosis, improving  - Holding antibiotics given allergic reaction.  - Infectious disease consult, advise stopping all abx in the setting of allergic reaction, this will take a couple weeks to clear up.   - 40 mg prednisone daily.  - Famotidine is to be dosed 10 mg every other day in setting of acute renal failure  - 125 mg Solu-Medrol was given twice (last 10/23/23)  - Continue to monitor clinical picture, hemodynamic stability.  - B/c from 10/21 growing likely positive due to contaminations, repeats pending.     #Diarrhea likely 2/2 adverse drug reaction, improving  - C. Diff panel negative, stool not watery, decreased frequency.  - Will discontinue vancomycin given above.   - Holding all laxatives/stool softeners  - Will give her imodium for her loose stool, no evidence of infectious colitis.     #Cellulitis of right lower extremity: Chronic, follows with Dr. Cain   - Wound approximately 8 x 8 cm.  - Saw Dr. Cain on 10/17/23 for wound debridement.  - Wound cultures from last admission illustrating a blunted Pseudomonas growth multidrug-resistant.  - Wound ostomy nurse consulted.      #IDDM 2:    - Mild SSI  - Increasing Lantus to 20 units given nightly given she has been on solumedrol and will be starting prednisone.    #HTN:   #A-fib/bifascicular block:   -Electronically atrially paced  #HLD:   #GERD:   -Continue home medications     IVF: None at this time  Diet: diabetic and renal  DVT prophylaxis: Heparin subcu  Dispo: Anticipate 2-3 days hospital stay  Consults: Nephrology, Dermatology, Infectious Disease, PT/OT     CODE STATUS:  DNR/DNI no ICU    This is a preliminary note, please await final signature from attending physician.    Gibran Georges, MS4  Internal Medicine    -----------------------------------------------------    I saw this patient with the above medical student and performed my own History and Physical Examination.   My Subjective and Objective findings are reflected in the above documentation.  The Assessment and Plan reflect my input. My Edits are included in the above documentation.    Discussed with attending,  Jl Kitchen M.D. PGY-2  Internal Medicine    This note has been transcribed using Dragon voice recognition system and there is a possibility of unintentional typing misprints.  Any information found to be copied from previous providers is done in the best interest of the patient to provide accurate, quality, and continuity of care.

## 2023-10-27 NOTE — DISCHARGE SUMMARY
Discharge Diagnosis  Acute renal failure, unspecified acute renal failure type (CMS/[facility])    Issues Requiring Follow-Up  IDDM 2, ALEKSEY on CKD, atrial fibrillation, exfoliative rash, diarrhea    Discharge Meds     Your medication list        START taking these medications        Instructions Last Dose Given Next Dose Due   sodium bicarbonate 650 mg tablet      Take 1 tablet (650 mg) by mouth 3 times a day.              CONTINUE taking these medications        Instructions Last Dose Given Next Dose Due   amLODIPine 5 mg tablet  Commonly known as: Norvasc           aspirin 81 mg EC tablet           BIOTIN FORTE ORAL           brimonidine 0.2 % ophthalmic solution  Commonly known as: AlphaGAN           cetirizine 10 mg tablet  Commonly known as: ZyrTEC      Take 1 tablet (10 mg) by mouth once daily.       cholecalciferol 25 MCG (1000 UT) tablet  Commonly known as: Vitamin D-3           coenzyme Q-10 10 mg capsule           ezetimibe 10 mg tablet  Commonly known as: Zetia           famotidine 40 mg tablet  Commonly known as: Pepcid           ferrous sulfate 325 (65 Fe) MG tablet           fish oil concentrate 120-180 mg capsule  Commonly known as: Omega-3           latanoprost 0.005 % ophthalmic solution  Commonly known as: Xalatan      Administer 1 drop into both eyes once daily.       metoprolol succinate XL 25 mg 24 hr tablet  Commonly known as: Toprol-XL           Multiple Vitamins tablet  Generic drug: multivitamin           nystatin 100,000 unit/gram powder  Commonly known as: Mycostatin      Apply 1 Application topically if needed for itching.       red yeast rice 600 mg tablet           Semglee(insulin glarg-yfgn)Pen 100 unit/mL (3 mL) Pen  Generic drug: insulin glargine-yfgn           triamcinolone 0.1 % cream  Commonly known as: Kenalog           VITAMIN C ORAL                  STOP taking these medications      allopurinol 100 mg tablet  Commonly known as: Zyloprim        amoxicillin 500 mg capsule  Commonly  known as: Amoxil        ciprofloxacin 750 mg tablet  Commonly known as: Cipro        furosemide 20 mg tablet  Commonly known as: Lasix        gentamicin 0.1 % cream  Commonly known as: Garamycin        lisinopril 40 mg tablet        losartan 25 mg tablet  Commonly known as: Cozaar        predniSONE 20 mg tablet  Commonly known as: Deltasone        Xarelto 15 mg tablet  Generic drug: rivaroxaban               ASK your doctor about these medications        Instructions Last Dose Given Next Dose Due   ketoconazole 2 % shampoo  Commonly known as: NIZOral      Apply topically 3 times a week.                 Where to Get Your Medications        These medications were sent to [id number] - [place], [place] - [address] AT [facility] [address]  [address], [facility] [address]      Phone: [phone]   sodium bicarbonate 650 mg tablet         Test Results Pending At Discharge  Pending Labs       Order Current Status    C. difficile, PCR Collected (10/23/23 4395)    Blood Culture Preliminary result    Blood Culture Preliminary result            Hospital Course  Patient is a 73-year-old female with PMH significant for A-fib (on Xarelto, history of bifascicular block), CKD 4 (baseline CR 1.5-1.9), IDDM-2, HLD, HTN,  history of MV  endocarditis (group B strep), lacunar CVA history of cerebral aneurysm and meningioma. Patient presented to the ED on 10/22/2023 for increasing bouts of diarrhea along with diffuse rash. She was also found to be be in acute renal failure with Cr of 5.32, from baseline 1.5. She was admitted for further workup. Patient given IVF and sodium bicarb with slow improvement in her kidney functions. C. Diff panel negative. Dermatology tele-consult, completed 5 day course of prednisone 40 mg with gradual improvement in the rash. ID consulted, believed rash likely 2/2 allergic reaction to her amoxicillin. All antibiotics discontinued. Aldolase levels  elevated, NORMAN negative, IgE elevated, flow cytometry unrevealing, recommend correlating with workup outpatient if deemed necessary. Patient will be discharged with sodium bicarb 650 mg PO TID and close follow up with nephrology (plan to have an appointment with her nephrologist and PCP in 7 days from discharge), patient is supposed to have BMP done within 7 days/prior to her PCP and nephrologist appointment.  Her Xarelto dose changed to 10 mg daily, patient is to continue sodium bicarb 650 mg twice daily until seen by her nephrologist, she is supposed to stop her diuretics until diarrhea resolves (indication to restart diuretics would be up to the discretion of her PCP and nephrology).  Patient is qualified for and accepted to SNF.     Pertinent Physical Exam At Time of Discharge  Physical Exam  Constitutional:       General: She is awake.      Appearance: Normal appearance.   HENT:      Head: Normocephalic and atraumatic.      Mouth/Throat:      Comments: No oral ulcers or rash involving oral mucosa.  Eyes:      General: No scleral icterus.     Extraocular Movements: Extraocular movements intact.      Conjunctiva/sclera:      Right eye: Right conjunctiva is not injected.      Left eye: Left conjunctiva is not injected.   Neck:      Thyroid: No thyromegaly.   Cardiovascular:      Rate and Rhythm: Normal rate and regular rhythm.      Heart sounds: Normal heart sounds.   Pulmonary:      Effort: Pulmonary effort is normal.      Breath sounds: Normal breath sounds.   Abdominal:      General: Abdomen is protuberant. Bowel sounds are normal.      Palpations: Abdomen is soft.      Tenderness: There is no abdominal tenderness.   Musculoskeletal:      Cervical back: Full passive range of motion without pain and neck supple.      Comments: Left upper extremity swelling and erythema compared to right, slightly improved from day prior. No thrombus on US.    Skin:     Findings: Rash present.      Comments: Warm and dry, diffuse  erythematous nonblanching exfoliative rash encompassing the entire body no longer sparing palms, soles and extending to neck and face, improved from day prior.    Neurological:      General: No focal deficit present.      Mental Status: She is alert and oriented to person, place, and time. Mental status is at baseline.   Psychiatric:         Attention and Perception: Attention normal.         Mood and Affect: Mood normal.         Speech: Speech normal.         Behavior: Behavior is cooperative.     Outpatient Follow-Up  Future Appointments   Date Time Provider Department Center   10/31/2023  9:30 AM Roverto Cain DPM [id number] [place]   10/31/2023  2:30 PM Stephania Warner MD [id number] [place]   11/22/2023  9:45 AM PAR OPCTR PET CT PAROPCPETMOB [facility]   11/22/2023 10:15 AM Roverto Cain DPM [id number] [place]   11/22/2023 10:45 AM Andre Heredia MD [id number] [place]   12/13/2023 11:20 AM Trip Cruz MD [id number] [place]   12/15/2023  2:00 PM Karis Wheat MD [id number] [place]   12/15/2023  3:40 PM Luna Porras MD [id number] [place]   1/8/2024 11:15 AM Tanja Pascual MD [id number] [place]   7/30/2024  1:00 PM [facility] [id number] [place]   7/30/2024  1:20 PM Artemio Sierra MD [id number] [place]         Jl Kitchen MD

## 2023-10-27 NOTE — PROGRESS NOTES
1034- Met with patient at bedside to discuss d/c planning. Pt states she prefers to go to SNF at d/c and [facility]. Per physician note, no need for HD. Facility has been updated. Patient will need transport at d/c. Will need to send a gold form and 7000.     Lin Tomas RN

## 2023-10-27 NOTE — PROGRESS NOTES
Kira Solo is a 73 y.o. female on day 5 of admission presenting with Acute renal failure, unspecified acute renal failure type (CMS/HCC).    Subjective   Patient seen and examined at bedside this morning, reports no overnight events. Rash is improving as well as her diarrhea.        Objective     Physical Exam  Constitutional:       General: She is awake.      Appearance: Normal appearance.   HENT:      Head: Normocephalic and atraumatic.      Mouth/Throat:      Comments: No oral ulcers or rash involving oral mucosa.  Eyes:      General: No scleral icterus.     Extraocular Movements: Extraocular movements intact.      Conjunctiva/sclera:      Right eye: Right conjunctiva is not injected.      Left eye: Left conjunctiva is not injected.   Neck:      Thyroid: No thyromegaly.   Cardiovascular:      Rate and Rhythm: Normal rate and regular rhythm.      Heart sounds: Normal heart sounds.   Pulmonary:      Effort: Pulmonary effort is normal.      Breath sounds: Normal breath sounds.   Abdominal:      General: Abdomen is protuberant. Bowel sounds are normal.      Palpations: Abdomen is soft.      Tenderness: There is no abdominal tenderness.   Musculoskeletal:      Cervical back: Full passive range of motion without pain and neck supple.      Comments: Left upper extremity swelling and erythema compared to right, slightly improved from day prior. No thrombus on US.    Skin:     Findings: Rash present.      Comments: Warm and dry, diffuse erythematous nonblanching exfoliative rash encompassing the entire body no longer sparing palms, soles and extending to neck and face, improved from day prior.    Neurological:      General: No focal deficit present.      Mental Status: She is alert and oriented to person, place, and time. Mental status is at baseline.   Psychiatric:         Attention and Perception: Attention normal.         Mood and Affect: Mood normal.         Speech: Speech normal.         Behavior: Behavior is  "cooperative.         Last Recorded Vitals  Blood pressure 148/80, pulse 70, temperature 36.3 °C (97.3 °F), resp. rate 16, height 1.48 m (4' 10.27\"), weight 83.9 kg (185 lb), SpO2 98 %.  Intake/Output last 3 Shifts:  I/O last 3 completed shifts:  In: 3316.7 (39.5 mL/kg) [P.O.:840; I.V.:2476.7 (29.5 mL/kg)]  Out: 975 (11.6 mL/kg) [Urine:975 (0.3 mL/kg/hr)]  Weight: 83.9 kg     Relevant Results  Scheduled medications  amLODIPine, 2.5 mg, oral, Daily  aspirin, 81 mg, oral, Daily  brimonidine, 1 drop, Both Eyes, BID  ezetimibe, 10 mg, oral, Daily  famotidine, 10 mg, oral, Every other day  ferrous sulfate, 65 mg of iron, oral, Daily  gentamicin, , Topical, Daily  heparin (porcine), 5,000 Units, subcutaneous, q8h  insulin glargine, 20 Units, subcutaneous, Nightly  insulin lispro, 0-10 Units, subcutaneous, TID with meals  latanoprost, 1 drop, Both Eyes, Daily  metoprolol succinate XL, 25 mg, oral, Daily  perflutren lipid microspheres, 0.5-10 mL of dilution, intravenous, Once in imaging  sodium bicarbonate, 650 mg, oral, TID      Continuous medications  lactated Ringer's, 100 mL/hr, Last Rate: 100 mL/hr (10/26/23 7535)  lactated Ringer's, 100 mL/hr, Last Rate: 100 mL/hr (10/27/23 0227)      PRN medications  PRN medications: dextrose 10 % in water (D10W), dextrose, glucagon, loperamide  Results from last 7 days   Lab Units 10/27/23  0952 10/26/23  0547 10/25/23  0720   WBC AUTO x10*3/uL 17.5* 16.5* 18.2*   RBC AUTO x10*6/uL 4.06 3.65* 3.57*   HEMOGLOBIN g/dL 11.8* 10.6* 10.3*       Results from last 7 days   Lab Units 10/26/23  0547 10/25/23  0720 10/24/23  1149 10/24/23  0624 10/22/23  1436 10/22/23  1408 10/21/23  1841 10/21/23  1841   SODIUM mmol/L 139 139 135* 137   < > 134*  --  136   POTASSIUM mmol/L 4.1 4.7 4.5 4.9   < > 5.4*  --  5.0   CHLORIDE mmol/L 106 103 100 102   < > 104  --  104   CO2 mmol/L 22 21 16* 19*   < > 15*  --  21   BUN mg/dL 102* 100* 97* 92*   < > 63*  --  57*   CREATININE mg/dL 4.09* 4.81* 5.25* " 5.30*   < > 5.25*  --  5.32*   CALCIUM mg/dL 8.0* 8.1* 8.6 8.6   < > 8.8  --  9.0   PHOSPHORUS mg/dL 5.8* 6.1*  --  5.9*   < >  --   --   --    MAGNESIUM mg/dL 2.38 2.38  --  2.27  --  2.12   < >  --    BILIRUBIN TOTAL mg/dL  --   --  0.3  --   --  0.4  --  0.4   ALT U/L  --   --  22  --   --  21  --  21   AST U/L  --   --  22  --   --  29  --  31    < > = values in this interval not displayed.         No results found.     Assessment/Plan   Principal Problem:    Acute renal failure, unspecified acute renal failure type (CMS/HCC)  Active Problems:    Essential (primary) hypertension    Candidiasis    Cardiac pacemaker in situ    Venous insufficiency (chronic) (peripheral)    Stage 3 chronic kidney disease (CMS/[facility])    Type 2 diabetes mellitus with unspecified complications (CMS/[facility])    Hyperlipidemia, unspecified    Obesity, unspecified    Obstructive sleep apnea syndrome    Chronic atrial fibrillation (CMS/[facility])    Patient is a 73-year-old female with PMH significant for A-fib (on Xarelto, history of bifascicular block), CKD 4 (baseline CR 1.5-1.9), IDDM-2, HLD, HTN,  history of MV  endocarditis (group B strep), lacunar CVA history of cerebral aneurysm and meningioma.  Patient presented to the ED on 10/22/2023 for increasing bouts of diarrhea along with diffuse rash.  Of note patient was recently admitted to [facility], and was discharged on p.o. amoxicillin + ciprofloxacin for right lower extremity wound infection with Pseudomonas.  Incidentally found in the ED via labs, she appeared to be be in acute renal failure with creatinine of 5.32, from baseline 1.5. Rash likely 2/2 allergic reaction to her antibiotics. Dermatology tele-consult preliminarily recommends starting prednisone 40 mg once daily. Discontinued after 5 days.      #Acute renal failure likely AIN, improving  - Patient's creatinine improving slowly. Baseline 1.5-1.9.  - Holding offending agents, abx, allopurinol.   - Received 5 days of steroids, will  discontinue.  - Nephrology:  mL/h, Bicarb 650 mg PO TID.  - Continue to trend RFP.  - Awaiting final nephrology recs for discharge.  - [facility], accepted to SNF upon discharge.     #Exfoliative dermatitis/erythroderma likely 2/2 allergic rxn to abx, improving  #Leukocytosis, improving  - Rash resolving, continues to exfoliate.    - Holding antibiotics given allergic reaction.  - Famotidine is to be dosed 10 mg every other day in setting of acute renal failure.    #Diarrhea likely 2/2 adverse drug reaction, improving  - C. Diff panel negative, stool not watery, decreased frequency.  - Will discontinue vancomycin given above.   - Holding all laxatives/stool softeners  - Will give her imodium for her loose stool, no evidence of infectious colitis.      #Positive aldolase, negative flow cytometry  #CRP Elevated  - Aldolase positive, elevated CRP, consider dermatomyositis vs inflamatory response 2/2 infection.  - NORMAN negative.   - Recommend outpatient follow up, unlikely that any autoimmune condition is contributing to her clinical presentation.      Chronic  #IDDM 2:    - Mild SSI  - Increasing Lantus to 20 units given nightly given she has been on solumedrol and will be starting prednisone.    #HTN:   #A-fib/bifascicular block:   -Electronically atrially paced  #HLD:   #GERD:   -Continue home medications  #Cellulitis LLE   - chronic      IVF: None at this time  Diet: diabetic and renal  DVT prophylaxis: Heparin subcu  Dispo: Anticipate 2-3 days hospital stay  Consults: Nephrology, Dermatology, Infectious Disease, PT/OT     CODE STATUS: DNR/DNI no ICU    This is a preliminary note, please await final signature from attending physician.    Gibran Georges, MS4  Internal Medicine    _______________________________________________    I saw this patient with the above medical student and performed my own History and Physical Examination.   My Subjective and Objective findings are reflected in the above documentation.  The  Assessment and Plan reflect my input. My Edits are included in the above documentation.    Discussed with attending,  Jl Kitchen M.D. PGY-2  Internal Medicine    This note has been transcribed using Dragon voice recognition system and there is a possibility of unintentional typing misprints.  Any information found to be copied from previous providers is done in the best interest of the patient to provide accurate, quality, and continuity of care.

## 2023-10-27 NOTE — PROGRESS NOTES
INPATIENT NEPHROLOGY CONSULT PROGRESS NOTE      Patient Name: Kira Solo MRN: [id number]  DATE of SERVICE: October 27, 2023  TIME of SERVICE: 08:20 AM  CONSULTING SERVICE: Nephrology    REASON for CONSULT: ALEKSEY, infectious related ATN, AIN    INTERVAL HISTORY:   Evaluated at bedside more awake and alert.  Reports feeling tired however her appetite is improving  UOP ~ 1 L   Serum creatinine down to 3.7 mg deciliter from a creatinine of 5.6 on admission.  BUN 97.  eGFR 12 mL/min per 1.73 m²    No uremic symptoms no indication for renal placement therapy  Diarrhea improving    SUMMARY OF STAY:  73-year-old female with multiple comorbidities including chronic kidney disease stage IIIb/IV follows with Dr. Porras, history of paroxysmal atrial fibrillation, hypertension, coronary artery disease, type 2 diabetes complicated with diabetic nephropathy, history of mitral valve endocarditis in 2017, meningioma resection in 2014, CVA with lacunar infarct, septic emboli.  Chronic lymphedema for which patient uses lymphedema pump.     Baseline serum creatinine 1.7 mg deciliter with EGFR of 30 as of July 2023     Presented to [facility] for further evaluation of worsening diarrhea along with diffuse skin rash, and her podiatry was concerned about worsening right foot wound.  Patient reports that she has a history of psoriasis initially thought the diffuse skin erythema was related to underlying psoriasis.  Patient with history of penicillin allergy and was given amoxicillin October 18, 2023  Labs on presentation demonstrated a creatinine 5.6 mg/dL with a BUN of 78 and GFR 7 mill per minute per 1.73 m² with concomitant metabolic acidosis bicarb 16.  Patient received volume with repeat creatinine 5.3 and BUN up to 97 EGFR 8 mill per minute.  Glucose 332.    ASSESSMENT:  1.  Acute kidney injury superimposed on underlying chronic kidney disease stage IV, acute  kidney injury multifactorial component of diarrhea, infectious related due to right lower extremity cellulitis,   Patient with underlying autoimmune disease including psoriasis.  Would likely component of AIN due to amoxicillin use improved with prednisone 40 mg p.o. daily.   diarrhea improving.     -C3, C4 within acceptable range.  -Suspected disseminated allergic reaction due to erythematous rash IgE elevated 1270 (penicillin allergy).   -Aldolase level elevated at 9.4    Protein to creatinine ratio 0.5 mg/g    Urea Nitrogen  6 - 23 mg/dL 102 High Panic  100 High Panic  97 High Panic  CM 92 High Panic          Creatinine  0.50 - 1.05 mg/dL 4.09 High  4.81 High  5.25 High  5.30 High      Serum creatinine down to 4 mg deciliter from 5.3 on presentation.  However BUN still elevated at 102  Urine output 550 cc    Advanced chronic kidney disease however due to lack of uremic symptoms no urgent indication for renal replacement therapy    2.  Profound metabolic acidosis possibly due to uremic acidemia and diarrhea to cont sodium bicarbonate     3.  Advanced chronic kidney disease stage IV, diabetic nephropathy     4.  Hypertension     5.  Complicated cardiac history including prior history of infective endocarditis affecting mitral valve, atrial fibrillation on chronic anticoagulation, coronary artery disease     6.  Prior history of CVA with septic emboli     7.  Lymphedema requiring lymphedema pump     8.  Psoriasis     Plan:  Acute kidney injury multifactorial: infectious related and AIN acute kidney injury:   To avoid penicillins  To hold further steroids and to continue to monitor as an outpatient.  To hold further IV fluid.   To cont sodium bicarb 650 mg po TID.   No indication for renal replacement therapy  will continue to monitor closely with you, thank you!    Medications:    Current Facility-Administered Medications:     amLODIPine (Norvasc) tablet 2.5 mg, 2.5 mg, oral, Daily, Jl Kitchen MD, 2.5 mg at  10/26/23 0951    aspirin EC tablet 81 mg, 81 mg, oral, Daily, Jl Kitchen MD, 81 mg at 10/26/23 0951    brimonidine (AlphaGAN) 0.2 % ophthalmic solution 1 drop, 1 drop, Both Eyes, BID, Jl Kitchen MD, 1 drop at 10/26/23 2056    dextrose 10 % in water (D10W) infusion, 0.3 g/kg/hr, intravenous, Once PRN, Jl Kitchen MD    dextrose 50 % injection 25 g, 25 g, intravenous, q15 min PRN, Jl Kitchen MD    ezetimibe (Zetia) tablet 10 mg, 10 mg, oral, Daily, Jl Kitchen MD, 10 mg at 10/26/23 0951    famotidine (Pepcid) tablet 10 mg, 10 mg, oral, Every other day, Jl Kitchen MD, 10 mg at 10/25/23 0850    ferrous sulfate 325 (65 Fe) MG tablet 65 mg of iron, 65 mg of iron, oral, Daily, Jl Kitchen MD, 65 mg of iron at 10/26/23 0951    gentamicin (Garamycin) 0.1 % cream, , Topical, Daily, Jl Kitchen MD, Given at 10/26/23 0952    glucagon (Glucagen) injection 1 mg, 1 mg, intramuscular, q15 min PRN, Jl Kitchen MD    heparin (porcine) injection 5,000 Units, 5,000 Units, subcutaneous, q8h, Jl Kitchen MD, 5,000 Units at 10/27/23 0540    insulin glargine (Lantus) injection 20 Units, 20 Units, subcutaneous, Nightly, Jl Kitchen MD, 20 Units at 10/26/23 2056    insulin lispro (HumaLOG) injection 0-10 Units, 0-10 Units, subcutaneous, TID with meals, lJ Kitchen MD, 2 Units at 10/26/23 1830    lactated Ringer's infusion, 100 mL/hr, intravenous, Continuous, Jl Kitchen MD, Last Rate: 100 mL/hr at 10/26/23 1859, 100 mL/hr at 10/26/23 1859    lactated Ringer's infusion, 100 mL/hr, intravenous, Continuous, Morales Perry MD, Last Rate: 100 mL/hr at 10/27/23 0227, 100 mL/hr at 10/27/23 0227    latanoprost (Xalatan) 0.005 % ophthalmic solution 1 drop, 1 drop, Both Eyes, Daily, Jl Kitchen MD, 1 drop at 10/26/23 2200    loperamide (Imodium) capsule 2 mg, 2 mg, oral, 4x daily PRN, Abhijit Gutierrez,     metoprolol succinate XL (Toprol-XL) 24 hr tablet 25 mg, 25 mg, oral, Daily, Jl Kitchen MD, 25  mg at 10/26/23 0951    perflutren lipid microspheres (Definity) injection 0.5-10 mL of dilution, 0.5-10 mL of dilution, intravenous, Once in imaging, Abhijit Gutierrez DO    predniSONE (Deltasone) tablet 40 mg, 40 mg, oral, Daily, Abhijit Gutierrez DO, 40 mg at 10/26/23 0951    PERTINENT ROS:  GENERAL:  positive for fatigue, poor appetite.  No fever/chills  RESPIRATORY:  positive for shortness of breath.  Negative for cough, wheezing.  CARDIOVASCULAR:   Negative for chest pain or palpitation.  GI:  + for abdominal pain, diarrhea  : + oliguria     Physical Exam:  Vital signs in last 24 hours:  Temp:  [36 °C (96.8 °F)-36.6 °C (97.9 °F)] 36.6 °C (97.9 °F)  Heart Rate:  [69-71] 71  Resp:  [16-18] 18  BP: (115-144)/(78-93) 115/93    General: Awake, cooperative, not in acute distress  HEENT:  NCAT,  mucous membranes moist and pink  NECK:  no JVD, no carotid bruit, supple, no cervical mass or thyromegaly  LUNGS;  Diminished breath sounds, no Rales  CV:  Distant, regular rate and rhythm, no murmurs  ABDOMEN:  abdomen soft, nontender, BS normal, no masses or organomegaly  EDEMA: multiple leg deformities. Chronic lymphedema better now.   SKIN:  + diffuse erythematous rash.      Intake/Output last 3 shifts:  I/O last 3 completed shifts:  In: 3316.7 (39.5 mL/kg) [P.O.:840; I.V.:2476.7 (29.5 mL/kg)]  Out: 975 (11.6 mL/kg) [Urine:975 (0.3 mL/kg/hr)]  Weight: 83.9 kg     DATA:  Diagnotic tests reviewed for Todays visit:  Results from last 7 days   Lab Units 10/26/23  0547   WBC AUTO x10*3/uL 16.5*   RBC AUTO x10*6/uL 3.65*   HEMOGLOBIN g/dL 10.6*   HEMATOCRIT % 32.8*       Results from last 7 days   Lab Units 10/26/23  0547 10/25/23  0720 10/24/23  1149   SODIUM mmol/L 139   < > 135*   POTASSIUM mmol/L 4.1   < > 4.5   CHLORIDE mmol/L 106   < > 100   CO2 mmol/L 22   < > 16*   BUN mg/dL 102*   < > 97*   CREATININE mg/dL 4.09*   < > 5.25*   CALCIUM mg/dL 8.0*   < > 8.6   PHOSPHORUS mg/dL 5.8*   < >  --    MAGNESIUM mg/dL 2.38   < >  --     BILIRUBIN TOTAL mg/dL  --   --  0.3   ALT U/L  --   --  22   AST U/L  --   --  22    < > = values in this interval not displayed.       Results from last 7 days   Lab Units 10/22/23  1019   COLOR U  Kellie*   APPEARANCE U  Hazy*   PH U  5.0   SPEC GRAV UR  1.018   PROTEIN U mg/dL 30 (1+)*   BLOOD UR  NEGATIVE   NITRITE U  NEGATIVE   WBC UR /HPF 6-10*   BACTERIA UR /HPF 2+*       IMAGING: CXR reviewed in  images      SIGNATURE: Morales Perry MD  Nephrology and Hypertension  [address] [place]  Office phone: [phone]  FAX: [phone]    This note was partially generated using the Dragon voice recognition system, and there may be some incorrect words, spelling's and punctuation that were not noted in checking the note before saving.

## 2023-10-27 NOTE — PROGRESS NOTES
Physical Therapy                 Therapy Communication Note    Patient Name: Kira Solo  MRN: [id number]  Today's Date: 10/27/2023     Discipline: Physical Therapy    Missed Visit Reason:      Missed Time: Attempt    Comment: Pt chart reviewed, cleared by nurse, pt declines at this time d/t increased fatigue. OOB benefits explained, pt continues to decline treatment at this time.

## 2023-10-27 NOTE — DISCHARGE INSTRUCTIONS
Please follow up with your primary care provider within 7 days for hospital follow up. Please call to make this appointment.   Please follow-up with your nephrologist (Dr. Porras) within 7 days, please call to make this appointment.    Please take your medications as prescribed.   Xarelto dose changed to 10 mg nightly with food instead of 15 mg in the past  Sodium bicarb 650 mg 3 times daily, this is a new prescription  Continue taking your insulin as instructed with close monitoring and follow-up with your PCP  Stop taking any diuretics (Lasix for example) as long as you encounter diarrhea, discuss with your PCP/nephrologist for when to reinstate this treatment.    There is a blood work (basic metabolic panel) that needs to be done within a week from discharge, the order is already in [facility] system.  Please make sure to have it done prior to your appointment with your PCP and nephrologist.    Avoid any antibiotics or steroids, try to use moisturizers on your exfoliative rash.    If you have any new or worsening symptoms seek medical attention.    Thank you for allowing us to participate in your care!    -[facility] Inpatient Medicine Teaching Service.

## 2023-10-27 NOTE — CARE PLAN
Pt w/o complaint this shift. Pt A&O x4, calm and cooperative. Pt up to bedside commode, loose watery stool. IV removed by pt accidentally. Uneventful shift. Report called to Merlin Vora.

## 2023-10-29 NOTE — PROGRESS NOTES
Spiritual Care Visit    Clinical Encounter Type  Visited With: Patient (I tried to see the patient on October 27.)  Routine Visit: Introduction (I tried to see the patient on October 27.)  Continue Visiting: No (I tried to see the patient on October 27.)    Zoroastrian Encounters  Zoroastrian Needs: Prayer (I tried to see the patient on October 27.)          For the time being, the patient declined  my offer to visit.                             Taxonomy  Intended Effects: Establish rapport and connectedness, Build relationship of care and support, Ashley affirmation, Demonstrate caring and concern (I tried to see the patient on October 27.)

## 2023-11-03 NOTE — PROGRESS NOTES
Unable to reach patient for call back.   LVM with call back number for patient to call if needed. Pt was discharged 10/27/23 to [facility].  .

## 2023-11-06 NOTE — TELEPHONE ENCOUNTER
Called and advised .    He states that she has been seen by the nursing home doctor but thank you for getting back to him.

## 2023-11-06 NOTE — TELEPHONE ENCOUNTER
----- Message from Stephania Warner MD sent at 11/6/2023 10:16 AM EST -----  Regarding: FW: Leg swelling; COVID, leg weakness  Contact: [phone]  I would advise they ask to be seen by the nursing home doc for these symptoms and if she is feeling quite bad and no one is seeing her then yes should go back to ER  ----- Message -----  From: Jero Victoria MA  Sent: 11/6/2023   9:55 AM EST  To: Stephania Warner MD  Subject: FW: Leg swelling; COVID, leg weakness              ----- Message -----  From: MaggyfidelinaKira  Sent: 11/5/2023   4:01 PM EST  To:  [id number] [id number] Clinical Support Staff  Subject: Leg swelling; COVID, leg weakness                Timmy Herman:  Kira is in a nursing home following  a stay in the hospital.  She has tested positive for COVID since her admission to the nursing home.  Her legs are swelling and she reports difficulty walking and also difficulty urinating.  should we have her readmitted to [facility]?    I am sending a copy of this message to DR. Dawson, her kidney specialist.      Alexy Solo

## 2023-11-14 NOTE — DOCUMENTATION CLARIFICATION NOTE
PATIENT:               FAVIOLA CAT  ACCT #:                  [id number]  MRN:                       [id number]  :                       1950  ADMIT DATE:       10/21/2023 5:15 PM  DISCH DATE:        10/27/2023 8:53 PM  RESPONDING PROVIDER #:        [id number]          PROVIDER RESPONSE TEXT:    Acute kidney injury with acute tubular necrosis    CDI QUERY TEXT:    [facility]_ATN    Instruction:    Based on your assessment of the patient and the clinical information, please provide the requested documentation by clicking on the appropriate radio button and enter any additional information if prompted.    Question: Please further clarify the diagnosis of acute kidney injury as    When answering this query, please exercise your independent professional judgment. The fact that a question is being asked, does not imply that any particular answer is desired or expected.    The patient's clinical indicators include:  Clinical Information:  73F presents for diarrhea, found to have ALEKSEY    Clinical Indicators:  - Crt, (10/21-10/25): 5.32-5.25-5.18-5.60-5.30-5.25-4.81  - baseline 1.3 10/17/23  - Neph, 10/24, [place]: Acute kidney injury superimposed on underlying chronic kidney disease stage IV, acute kidney injury multifactorial component of diarrhea, infectious related due to right lower extremity cellulitis, suspected AIN due to diffuse disseminated rash.  - ID, 10/25Bautista: Drug rash due to amoxicillin; Suspected AIN from amoxicillin    Treatment:  IVF, Neph consult, serial Crt    Risk Factors:  CKD4, adverse reaction to amoxicillin  Options provided:  -- Acute kidney injury with acute tubular necrosis  -- Acute kidney injury without acute tubular necrosis  -- Other - I will add my own diagnosis  -- Refer to Clinical Documentation Reviewer    Query created by: Josee Dacosta on 10/25/2023 5:36 PM      Electronically signed by:  DENNIS LEAL MD 2023 4:50 PM

## 2023-11-20 NOTE — PROGRESS NOTES
Discharge Facility:[facility]  Discharge Diagnosis:  Covid 19  Cellulitis RLE  Venous insufficiency  Muscle Weakness    Admission Date:10/27/2023  Discharge Date: 11/17/2023    [facility]: 10/21/2023-10/27/2023    PCP Appointment Date:TBD  Specialist Appointment Date:   11/22/2023 Dr. Heredia/Vascular Surgeon  12/13/2023 Trip Cruz/Cardiology  12/15/2023 Dr. Porras/Nephrology    Hospital Encounter and Summary: Linked   See discharge assessment below for further details  Engagement  Call Start Time: 1444 (11/20/2023  3:04 PM)    Medications  Medications reviewed with patient/caregiver?: Not applicable (No Change) (11/20/2023  3:04 PM)  Is the patient having any side effects they believe may be caused by any medication additions or changes?: No (11/20/2023  3:04 PM)  Does the patient have all medications ordered at discharge?: Not applicable (11/20/2023  3:04 PM)  Care Management Interventions: No intervention needed (11/20/2023  3:04 PM)  Is the patient taking all medications as directed (includes completed medication regime)?: Yes (11/20/2023  3:04 PM)    Appointments  Does the patient have a primary care provider?: Yes (11/20/2023  3:04 PM)  Care Management Interventions: -- (Would like to do virtual if possible) (11/20/2023  3:04 PM)  Has the patient kept scheduled appointments due by today?: Yes (11/20/2023  3:04 PM)    Self Management  What is the home health agency?: -- ([facility]) (11/20/2023  3:04 PM)  Has home health visited the patient within 72 hours of discharge?: Yes (Nurse came to home today to change dressing on leg) (11/20/2023  3:04 PM)    Patient Teaching  Does the patient have access to their discharge instructions?: Yes (11/20/2023  3:04 PM)  What is the patient's perception of their health status since discharge?: Same (Kira states she is still very weak, has cough and loose bm. She is hoping PT will help get her stronger.) (11/20/2023  3:04 PM)  Is the patient/caregiver able to teach back  the hierarchy of who to call/visit for symptoms/problems? PCP, Specialist, Home Health nurse, Urgent Care, ED, 911: Yes (11/20/2023  3:04 PM)    Wrap Up  Wrap Up Additional Comments: -- (Kira is not eating too well, encouraged nutrition drinks and foods that appeal to her. She will report to provider if loose bm remains.) (11/20/2023  3:04 PM)

## 2023-11-21 PROBLEM — E16.2 HYPOGLYCEMIA: Status: ACTIVE | Noted: 2023-01-01

## 2023-11-21 NOTE — SIGNIFICANT EVENT
Initial point-of-care glucose for patient done at bedside prior to triage shows patient is normoglycemic at this time.  Reported by EMS she received oral glucose, juice, ginger ale, no dextrose was needed to be given intravenously.

## 2023-11-21 NOTE — ED PROVIDER NOTES
HPI   Chief Complaint   Patient presents with    Hypoglycemia     Pt had not eaten today, was transferring from bed to chair with  and slid out of the chair being lowered by  to ground.       This is a 73-year-old female who arrives via EMS with chief complaint of having felt weak, slid out of her chair earlier today.  On scene EMS reported that the patient's glucose was 58.  They gave her oral glucose, juice and ginger ale on arrival to the emergency department the patient is normoglycemic.  Patient also has a complaint of diarrheal illness in the last few days, no episodes of diarrhea today.  Additionally the patient has complaint of weeping wounds on her lower right leg, patient is seen by wound care nurse for this last seen yesterday for her first wound care appointment.  The patient does live at home with her .  Recently was discharged to a nursing facility after being given an antibiotic to which she had a reaction that sounds similar to Lars Canelo.  At the nursing facility the patient did contract COVID, recently recovered from COVID.  Patient's  states that patient does have a wet cough as well.  Patient is alert and oriented, conversational.  She has a significant medical history as well as several allergies but have been reviewed.      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    Benign brain tumor    Candidiasis of skin and nail 06/09/2017    Candidiasis, cutaneous    Central retinal artery occlusion, left eye 05/04/2022    Central retinal artery occlusion, left eye    Central retinal artery occlusion, right eye 05/04/2022    Central retinal artery occlusion, right eye    Diarrhea, unspecified 07/31/2020    Acute diarrhea    Endocarditis, valve  unspecified 01/18/2019    Endocarditis    Essential (primary) hypertension 12/07/2022    Benign essential hypertension    Glaucoma secondary to other eye disorders, bilateral, stage unspecified 05/04/2022    Neovascular glaucoma of both eyes    Nonrheumatic mitral (valve) insufficiency 04/24/2017    Mitral regurgitation    Other vascular disorders of iris and ciliary body, left eye 05/04/2022    Rubeosis iridis of left eye    Other vascular disorders of iris and ciliary body, right eye 05/04/2022    Rubeosis iridis of right eye    Personal history of other diseases of the circulatory system     History of hypertension    Personal history of other diseases of urinary system     History of chronic kidney disease    Septic arterial embolism (CMS/HCC) 05/04/2022    Cerebral septic emboli    Septic arterial embolism (CMS/HCC) 03/28/2017    Cerebral septic emboli    Unspecified color vision deficiencies 03/28/2017    Visual color changes    Unspecified retinal vascular occlusion 05/04/2022    Retinal vascular occlusion of both eyes    Unspecified visual disturbance 03/28/2017    Change in vision    Visual hallucinations 04/24/2017    Formed visual hallucinations     Past Surgical History:   Procedure Laterality Date    CT ANGIO NECK  3/28/2017    CT NECK ANGIO W AND WO IV CONTRAST 3/28/2017 [facility] CLINICAL LEGACY    CT HEAD ANGIO W AND WO IV CONTRAST  3/28/2017    CT HEAD ANGIO W AND WO IV CONTRAST 3/28/2017 [facility] CLINICAL LEGACY    MR HEAD ANGIO WO IV CONTRAST  3/28/2018    MR HEAD ANGIO WO IV CONTRAST 3/28/2018 [facility]CILL[facility] LEGACY    OTHER SURGICAL HISTORY  05/27/2022    Pacemaker insertion    OTHER SURGICAL HISTORY  05/22/2017    Craniotomy Tumor Removal - Complete    OTHER SURGICAL HISTORY  07/01/2019    Colonoscopy complete for polypectomy    OTHER SURGICAL HISTORY  07/01/2019    Endoscopy    TONSILLECTOMY  05/15/2014    Tonsillectomy     Family History   Problem Relation Name Age of Onset    Other (adopted child)  Mother      Other (adopted child) Father       Social History     Tobacco Use    Smoking status: Never    Smokeless tobacco: Never   Vaping Use    Vaping Use: Never used   Substance Use Topics    Alcohol use: Not Currently    Drug use: Never       Physical Exam   ED Triage Vitals [23 1126]   Temp Heart Rate Resp BP   36.4 °C (97.5 °F) 69 20 152/81      SpO2 Temp Source Heart Rate Source Patient Position   98 % Tympanic Monitor Lying      BP Location FiO2 (%)     Right arm --       Physical Exam  Vitals and nursing note reviewed.   Constitutional:       General: She is not in acute distress.     Appearance: Normal appearance. She is obese. She is not ill-appearing, toxic-appearing or diaphoretic.   HENT:      Head: Normocephalic and atraumatic.      Nose: Nose normal. No congestion or rhinorrhea.      Mouth/Throat:      Mouth: Mucous membranes are moist.      Pharynx: Oropharynx is clear. No oropharyngeal exudate or posterior oropharyngeal erythema.   Eyes:      General:         Right eye: No discharge.         Left eye: No discharge.      Extraocular Movements: Extraocular movements intact.      Conjunctiva/sclera: Conjunctivae normal.   Cardiovascular:      Rate and Rhythm: Normal rate and regular rhythm.      Pulses: Normal pulses.      Heart sounds: Normal heart sounds. No murmur heard.     No friction rub. No gallop.   Pulmonary:      Effort: Pulmonary effort is normal. No respiratory distress.      Breath sounds: Normal breath sounds. No stridor. No wheezing, rhonchi or rales.   Abdominal:      General: Abdomen is flat. There is no distension.      Palpations: Abdomen is soft. There is no mass.      Tenderness: There is no abdominal tenderness. There is no guarding.      Hernia: No hernia is present.   Musculoskeletal:         General: Swelling present. Normal range of motion.      Right lower le+ Edema present.      Left lower le+ Edema present.   Skin:     General: Skin is warm and dry.       Capillary Refill: Capillary refill takes less than 2 seconds.      Findings: Wound present.          Neurological:      General: No focal deficit present.      Mental Status: She is alert and oriented to person, place, and time.   Psychiatric:         Mood and Affect: Mood normal.         Behavior: Behavior normal.         Thought Content: Thought content normal.         Judgment: Judgment normal.         ED Course & MDM   ED Course as of 11/22/23 0247   Tue Nov 21, 2023   1333 CBC and Auto Differential(!) [PV]   1333 POCT GLUCOSE [PV]   1333 Initial point-of-care glucose was 89, normoglycemic on arrival to the emergency department.  Hemoglobin 11.1, hematocrit 36.7, no signs of acute anemia or blood loss.  WBC 11.1 no leukocytosis to indicate systemic infection. [PV]   1428 Comprehensive metabolic panel(!) [PV]   1429 Comprehensive metabolic panel(!) [PV]   1431 Patient's creatinine 2.09, BUN 38, this is at baseline for the patient, given her history of chronic kidney disease.  Mild hypokalemia with potassium of 3.3, glucose at this time 132, hypoglycemia resolved.  Initial troponin 29, delta troponin ordered and pending.  Influenza pending. [PV]   1532 Urinalysis appears to have bacterial infection, nitrates negative, small amount of leukocyte Estrace.  Influenza negative for influenza a influenza B [PV]   1533 Microscopic Only, Urine(!) [PV]   1533 Troponin I, High Sensitivity(!) [PV]   1533 Urinalysis with Reflex Microscopic and Culture(!) [PV]   1605 Due to the patient's many allergies to antibiotics including ciprofloxacin, Lars Canelo's from amoxicillin, allergy to penicillin and allergy to sulfa drugs, we will await urine culture before prescribing antibiotic coverage for urinary tract infection. [PV]   1729 POCT Glucose(!): 120  Patient was fed with p.o. sandwich.  Repeat blood glucose 120, patient is amenable to discharge home, will transport via EMS.  Arrangements to be transported home via EMS [PV]    1729  were made. [PV]   1805 Interpreted by the Emergency Department Attending: ECG revealed normal sinus rhythm at a rate of 80 beats per minute with MT interval 122 , QRS of 88 , QTc of 455. No appreciable ST elevations or depressions.    [MG]      ED Course User Index  [MG] Onofre Fitch DO  [PV] Sal Sutton DO         Diagnoses as of 11/22/23 0247   Hypoglycemia       Medical Decision Making  Twelve-lead ECG, chest x-ray, CBC, CMP, urinalysis, influenza testing, and troponin ordered.  Patient's wounds were evaluated, appear to be well dressed from yesterday, new dressing was applied as the dressing was wet, patient arrived via EMS and it was raining outside.        Procedure  Procedures     Sal Sutton DO  Resident  11/21/23 0669  -----------------------------------------  I did evaluate the patient independently from the resident and was present for key medical decision making.  Agree with disposition.  Any discrepancies between residents findings are detailed below.    Patient overall well-appearing mentating appropriately alert and oriented to person time and place  is at bedside reporting patient is mentating appropriately at this time as well.  Reportedly had low glucose by EMS was given orals and improved.  She was given additional p.o. while in the emergency department glucose improved to 120.  Cardiac troponin minimally elevated this was obtained secondary to generalized weakness no ischemic changes on EKG doubt atypical ACS likely related to patient's chronic CKD.  CKD is improved no ALEKSEY.  No significant electrolyte derangements mild hypokalemia which was repleted with oral potassium.  Urinalysis with questionable UTI she is not having urinary symptoms at this time therefore we will hold off on treatment due to multiple antibiotic allergies and send for culture.  She may be a candidate for fosfomycin if culture returns positive.  She was offered admission to the hospital due to  her generalized weakness but is adamant that she would like to go home with her .  He is agreeable for caring for her.  He does report that he will need some assistance getting her into the household therefore will need ambulette transport.  Patient remains in stable condition hemodynamically stable blood glucose within normal range well-appearing.  She does have chronic wounds bilateral lower extremities these do not appear to be infected whatsoever no surrounding erythema or cellulitis no purulence.  She does see wound care regularly.  She has some edema of the lower extremities for which she reports is chronic and unchanged.  She was recommended to follow-up with her cardiologist and primary physician for continued diuresis.  Given strict return precautions and discharged in stable condition.    Onofre Fitch, DO Onofre Fitch,   11/22/23 0250

## 2023-11-21 NOTE — DISCHARGE INSTRUCTIONS
Please follow-up with your primary care physician as soon as possible at the no you are evaluated at the emergency department.  Please ensure that you are eating appropriately to prevent other hypoglycemic events.  Advised to check your blood sugar and administer orange juice, sugar to yourself if it is below 80.  You can always return to any emergency department anytime if you feel like you need to be reevaluated.

## 2023-11-24 PROBLEM — L97.909 CHRONIC CUTANEOUS VENOUS STASIS ULCER (MULTI): Status: ACTIVE | Noted: 2023-01-01

## 2023-11-24 PROBLEM — I83.009 CHRONIC CUTANEOUS VENOUS STASIS ULCER (MULTI): Status: ACTIVE | Noted: 2023-01-01

## 2023-11-24 NOTE — PROGRESS NOTES
PHARMACIST CONSULT  RENAL DOSING ADJUSTMENT    Patient Name: Kira Solo  MRN: [id number]  Admission Date: 11/24/2023  3:27 PM  Date/Time of Consult: 11/24/23 at 6:31 PM    In accordance with the inpatient pharmacy renal dose adjustment consult agreement the following medication changes have been made:  Famotidine: currently ordered 40mg q24hr, will be adjusted to 10mg q24hr      Results from last 72 hours   Lab Units 11/24/23  1332   CREATININE mg/dL 2.15*   BUN mg/dL 39*       Serum creatinine: 2.15 mg/dL (H) 11/24/23 1332  Estimated creatinine clearance: 21 mL/min (A)      Per consult agreement, pharmacy will order related labs, evaluate results, and adjust dosing as it pertains to the drug therapy being managed.  Thank you for allowing me to participate in the care for this patient.    Signature: Maria Esther Dutta, PharmD  11/24/23 at 6:31 PM    Maria Esther Dutta, LewD

## 2023-11-24 NOTE — H&P
History Of Present Illness  Kira Solo is a 73 y.o. female with a pmhx chronic kidney disease stage IIIb/IV (follows with Dr. Porras) paroxysmal atrial fibrillation (AC), HTN, coronary artery disease, type 2 diabetes complicated with diabetic nephropathy, history of mitral valve endocarditis in 2017, meningioma resection in 2014, CVA with lacunar infarct, septic emboli, Chronic lymphedema for which patient uses lymphedema pump presenting to the ER after being seen here 3 days ago for similar complaints generalized weakness and chronic venosus stasis/wound/ulcers. Of note, patient presented to the ED on 10/22/2023 for increasing bouts of diarrhea along with diffuse rash secondary to antibiotic allergy. During that hospital stay (10/23-10/27); ID consulted, believed rash likely 2/2 allergic reaction to her amoxicillin. Hospitalization was further complicated with AIN and diarrhrea. Patient was instructed to continue sodium bicarb 650 mg twice daily until seen by her nephrologist, she is supposed to stop her diuretics until diarrhea resolves (indication to restart diuretics). Prior to discharge, abx were discontinued and pt went to acute rehab on 10/27/23. Patient reports she was returned home form acute rehab from [facility] on 10/17/23. states while there she tested positive for COVID for 2 weeks. Patient reports she was not happy with the PT/OT at her acute rehab, states she came back weaker. Pt reports at baseline she sues at walker because she has a bad left knee. Pt states she is having trouble standing and her  is not able to help her as much. Pt is requesting for placement, do not feel she is suitable for home at this time. Pt reports chronic diarrhea, which as been going on since October 2023. Patient reports she does have a wound nurse who visited her twice last week to help change dressing for her chronic wound ulcers. Pt reports while at acute rehab during wound dressing changes, she was told  that her wounds look better. Pt reports she has had these wounds for a while and they are chronic. Pt denies cp, sob, chills or fevers.     In the ED:  -Vitals: Temp 98.2 F, HR 68, RR 20, Blood pressure: 177/72, SPO2 99% RA  -Labs:  CBC: WBC 12.5; HG: 10.2, CMP: Glucose 162, BUN/CR 39/2.15 (Baseline: 1.5-1.9); BNP: 421, otherwise unremarkable           -Imaging: Chest xray: no acute cardiopulmonary process is evident.   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, bilateral, stage unspecified (05/04/2022), Nonrheumatic mitral (valve) insufficiency (04/24/2017), Other vascular disorders of iris and ciliary body, left eye (05/04/2022), Other vascular disorders of iris and ciliary body, right eye (05/04/2022), Personal history of other diseases of the circulatory system, Personal history of other diseases of urinary system, 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    Surgical History  She has a past surgical history that includes Tonsillectomy (05/15/2014); Other surgical history (05/27/2022); Other surgical history (05/22/2017); Other surgical history (07/01/2019); Other surgical history (07/01/2019); MR angio head wo IV contrast (3/28/2018); CT  angio head w and wo IV contrast (3/28/2017); and CT angio neck (3/28/2017).     Social History  She reports that she has never smoked. She has never used smokeless tobacco. She reports that she does not currently use alcohol. She reports that she does not use drugs.    Family History  Family History   Problem Relation Name Age of Onset    Other (adopted child) Mother      Other (adopted child) Father          Allergies  Adalimumab, Amoxicillin, Metformin, Iodinated contrast media, Iodine, Milk containing products (dairy), Shellfish containing products, Statins-hmg-coa reductase inhibitors, Sulfa (sulfonamide antibiotics), Ciprofloxacin, Iodides, Latex, and Penicillins    Review of Systems   ROS negative unless stated otherwise in the HPI     Physical Exam  Constitutional:       Appearance: She is obese.   HENT:      Head: Normocephalic and atraumatic.      Nose: Nose normal.      Mouth/Throat:      Mouth: Mucous membranes are moist.   Eyes:      Extraocular Movements: Extraocular movements intact.      Conjunctiva/sclera: Conjunctivae normal.      Pupils: Pupils are equal, round, and reactive to light.   Cardiovascular:      Rate and Rhythm: Normal rate and regular rhythm.   Pulmonary:      Effort: Pulmonary effort is normal.      Breath sounds: Normal breath sounds.   Abdominal:      General: Abdomen is flat. Bowel sounds are normal.      Palpations: Abdomen is soft.   Musculoskeletal:      Cervical back: Normal range of motion and neck supple.   Skin:     Comments: BL LE: venous stasis skin changes of bilateral lower extremities with multiple open wounds/ulcers on anterior right ulcers   Neurological:      General: No focal deficit present.      Mental Status: She is alert and oriented to person, place, and time.                Last Recorded Vitals  /66   Pulse 71   Temp 36 °C (96.8 °F)   Resp 18   Wt 90.7 kg (200 lb)   SpO2 97%     Relevant Results    Scheduled medications  rivaroxaban, 10 mg, oral,  Daily with evening meal      Continuous medications     PRN medications     Results for orders placed or performed during the hospital encounter of 11/24/23 (from the past 24 hour(s))   Comprehensive metabolic panel   Result Value Ref Range    Glucose 162 (H) 74 - 99 mg/dL    Sodium 139 136 - 145 mmol/L    Potassium 4.5 3.5 - 5.3 mmol/L    Chloride 105 98 - 107 mmol/L    Bicarbonate 25 21 - 32 mmol/L    Anion Gap 14 10 - 20 mmol/L    Urea Nitrogen 39 (H) 6 - 23 mg/dL    Creatinine 2.15 (H) 0.50 - 1.05 mg/dL    eGFR 23 (L) >60 mL/min/1.73m*2    Calcium 8.4 (L) 8.6 - 10.3 mg/dL    Albumin 3.1 (L) 3.4 - 5.0 g/dL    Alkaline Phosphatase 97 33 - 136 U/L    Total Protein 6.4 6.4 - 8.2 g/dL    AST 66 (H) 9 - 39 U/L    Bilirubin, Total 0.4 0.0 - 1.2 mg/dL    ALT 22 7 - 45 U/L   CBC and Auto Differential   Result Value Ref Range    WBC 12.5 (H) 4.4 - 11.3 x10*3/uL    nRBC 0.0 0.0 - 0.0 /100 WBCs    RBC 3.54 (L) 4.00 - 5.20 x10*6/uL    Hemoglobin 10.2 (L) 12.0 - 16.0 g/dL    Hematocrit 33.8 (L) 36.0 - 46.0 %    MCV 96 80 - 100 fL    MCH 28.8 26.0 - 34.0 pg    MCHC 30.2 (L) 32.0 - 36.0 g/dL    RDW 16.4 (H) 11.5 - 14.5 %    Platelets 450 150 - 450 x10*3/uL    Neutrophils % 72.1 40.0 - 80.0 %    Immature Granulocytes %, Automated 1.1 (H) 0.0 - 0.9 %    Lymphocytes % 17.0 13.0 - 44.0 %    Monocytes % 6.7 2.0 - 10.0 %    Eosinophils % 2.7 0.0 - 6.0 %    Basophils % 0.4 0.0 - 2.0 %    Neutrophils Absolute 9.01 (H) 1.60 - 5.50 x10*3/uL    Immature Granulocytes Absolute, Automated 0.14 0.00 - 0.50 x10*3/uL    Lymphocytes Absolute 2.12 0.80 - 3.00 x10*3/uL    Monocytes Absolute 0.84 (H) 0.05 - 0.80 x10*3/uL    Eosinophils Absolute 0.34 0.00 - 0.40 x10*3/uL    Basophils Absolute 0.05 0.00 - 0.10 x10*3/uL   B-type natriuretic peptide   Result Value Ref Range     (H) 0 - 99 pg/mL      XR chest 1 view    Result Date: 11/24/2023  Interpreted By:  Amrit Albright, STUDY: Chest dated  11/24/2023.   INDICATION: Signs/Symptoms:fluid  overload   COMPARISON: Chest dated 11/21/2020.   ACCESSION NUMBER(S): [id number]   ORDERING CLINICIAN: TAN ALFARO   TECHNIQUE: One view of the chest.   FINDINGS: Lungs are clear.  No pneumothorax or effusion is evident. The cardiomediastinal silhouette is  enlarged but similar to the prior exam.There is a pulse generator on the left chest wall with leads tracking over the heart.Degenerative change is seen of the spine and shoulders.       No acute cardiopulmonary process is evident.   MACRO: None   Signed by: Amrit Albright 11/24/2023 4:43 PM Dictation workstation:   [id number]    XR chest 1 view    Result Date: 11/21/2023  Interpreted By:  Macy Anderson, STUDY: XR CHEST 1 VIEW;  11/21/2023 12:55 pm   INDICATION: Signs/Symptoms:weak cough.   COMPARISON: 05/19/2022   ACCESSION NUMBER(S): [id number]   ORDERING CLINICIAN: JESSICA TORRES   TECHNIQUE: Portable AP upright   FINDINGS: ICD is present in the left upper chest wall with electrode wires in the right atrium and right ventricle. Cardiac silhouette is upper normal in size. Aorta is atherosclerotic. Small hiatal hernia is noted in midline. Lungs are clear. No acute changes are noted in the osseous structures.       No acute radiographic abnormality is identified   MACRO: None.   Signed by: Macy Anderson 11/21/2023 1:33 PM Dictation workstation:   [id number]               Assessment/Plan   A 73 y.o. female with a pmhx chronic kidney disease stage IIIb/IV (follows with Dr. Porras) paroxysmal atrial fibrillation (Eliquis), HTN, coronary artery disease, type 2 diabetes complicated with diabetic nephropathy, history of mitral valve endocarditis in 2017, meningioma resection in 2014, CVA with lacunar infarct, septic emboli, Chronic lymphedema for which patient uses lymphedema pump presenting to the ER after being seen here 3 days ago for similar complaints generalized weakness. Of note, patient presented to the ED on 10/22/2023 for increasing bouts of  diarrhea along with diffuse rash secondary to antibiotic allergy. During that hospital stay (10/23-10/27); ID consulted, believed rash likely 2/2 allergic reaction to her amoxicillin. Hospitalization was further complicated with AIN and diarrhrea.     #Generalized Weakness  #Recent COVID Infection (2 weeks)  #Leukocytosis  #Chronic Lymphedema   #Chronic RT LE Ulcers/Wounds  #Hx of Penicillin allergy   Chief Compliant: Patient reports she feels weak after coming back from acute rehab  P/E: as above  Labs: CBC: WBC 12.5; HG: 10.2, BNP: 421           Imaging: Chest xray: no acute cardiopulmonary process is evident.   Plan:  -Consulted PT/OT and CM for placement   -S/P 1 dose of IV Rocephin   -Follow up on Blood and wound cultures  -Consulted wound nurse; appreciate recs  -At this time, wound/ulcer look chronic; non-infected; will hold off on abx and ID consult     #Acute renal failure on CKD, improving   Baseline 1.5-1.9; CR: 2.15 (recovering from recent AIN)  Patient was instructed to continue sodium bicarb 650 mg twice daily until seen by her nephrologist, she is supposed to stop her diuretics until diarrhea resolves (indication to restart diuretics), during her last hospital stay (10/23-10/27)  Plan:  -Avoid nephrotoxic agents   -Holding home allopurinol  -Continue home Bicarb 650 mg PO TID  -Continue following with established outpatient nephrology     #Chronic Diarrhea likely 2/2 adverse drug reaction, improving  During recent hospital stay started having loose stools; contributed to abx and had a negative C.Diff PCR then  Plan:  -Continue monitor; hold off on ordering stool studies  -Order imodium for her loose stool prn    #IDDM 2  #HTN  #A-fib/bifascicular block  -Electronically atrially paced  #HLD:   #GERD:   #Cellulitis LLE   - Continued home medications, Mild SSI    IVF: None at this time  Diet: diabetic and renal  DVT prophylaxis: xarelto  Consults: PT/OT, Wound Nurse and CM  Code Staus: DNR/DNI no ICU      Dispo: Generalized weakness; consulted Wound nurse, PT/OT and CM for placement     Kaiser Toussaint DO  Internal medicine, PGY-3  To be staffed with attending     Principal Problem:    Chronic cutaneous venous stasis ulcer (CMS/HCC)         Norbert Saab DO

## 2023-11-24 NOTE — PROGRESS NOTES
Call from ED for possible placement.  Met with pt and .  Pt was dc'd from this hospital on 10/27/23 to [facility].  Pt has been home for one week.  She is feeling weak, and per Dr. Parson will need IV antibiotics.  Pt does not want to return to [facility], but is requesting referrals to [facility] [address], [facility].  Referrals to be sent once there is documentation in the chart.      ADDIE Friedman

## 2023-11-24 NOTE — ED PROVIDER NOTES
EMERGENCY DEPARTMENT ENCOUNTER      Pt Name: Kira Solo  MRN: [id number]  Birthdate 1950  Date of evaluation: 11/24/2023  Provider: Zhang Parson DO    CHIEF COMPLAINT       Chief Complaint   Patient presents with    Rash     Leg weakness         HISTORY OF PRESENT ILLNESS    74yo F with PMHx of chronic kidney disease stage IIIb/IV follows with Dr. Porras, paroxysmal atrial fibrillation, hypertension, coronary artery disease, type 2 diabetes complicated with diabetic nephropathy, history of mitral valve endocarditis in 2017, meningioma resection in 2014, CVA with lacunar infarct, septic emboli, Chronic lymphedema for which patient uses lymphedema pump presenting to the ER after being seen here 3 days ago for similar complaints. Pt is endorsing weakness with ambulation as well as a weeping wound of R lower extremity. Pt is also endorsing swelling of BL Lower extremities. Denies any fevers, N/V. She does endorse being in a facility for IV abx for 3 weeks for a wound. She was discharged home Friday. Since then she has difficulty ambulating and her  is unable to support her. She was recently admitted one month ago for ALEKSEY 2/2 presumed AIN and rash from amoxicillin. Pt was discharged to a rehab facility. Claims she was not happy with the care she got there and barely moved around, this made it more difficulty for her to move when she got home. She also has had some LE swelling  that has been present chronically, she is not sure what the cause of this is. She wad discharged from her last admission on 10/27. Her renal function at that time was BUN 98 and Cr of 3.74. Pt and  both do not feel like she is suitable to go home at this time. They wish for her to go to  a rehab facility, but not to the one she went to last time.    PMHx: as listed above  Allergues: as listed in chart          Nursing Notes were reviewed.    PAST MEDICAL HISTORY     Past Medical History:   Diagnosis Date    Anemia,  unspecified 10/09/2020    Acute anemia    Benign neoplasm of brain, unspecified (CMS/[facility]) 04/24/2017    Benign brain tumor    Candidiasis of skin and nail 06/09/2017    Candidiasis, cutaneous    Central retinal artery occlusion, left eye 05/04/2022    Central retinal artery occlusion, left eye    Central retinal artery occlusion, right eye 05/04/2022    Central retinal artery occlusion, right eye    Diarrhea, unspecified 07/31/2020    Acute diarrhea    Endocarditis, valve unspecified 01/18/2019    Endocarditis    Essential (primary) hypertension 12/07/2022    Benign essential hypertension    Glaucoma secondary to other eye disorders, bilateral, stage unspecified 05/04/2022    Neovascular glaucoma of both eyes    Nonrheumatic mitral (valve) insufficiency 04/24/2017    Mitral regurgitation    Other vascular disorders of iris and ciliary body, left eye 05/04/2022    Rubeosis iridis of left eye    Other vascular disorders of iris and ciliary body, right eye 05/04/2022    Rubeosis iridis of right eye    Personal history of other diseases of the circulatory system     History of hypertension    Personal history of other diseases of urinary system     History of chronic kidney disease    Septic arterial embolism (CMS/[facility]) 05/04/2022    Cerebral septic emboli    Septic arterial embolism (CMS/[facility]) 03/28/2017    Cerebral septic emboli    Unspecified color vision deficiencies 03/28/2017    Visual color changes    Unspecified retinal vascular occlusion 05/04/2022    Retinal vascular occlusion of both eyes    Unspecified visual disturbance 03/28/2017    Change in vision    Visual hallucinations 04/24/2017    Formed visual hallucinations         SURGICAL HISTORY       Past Surgical History:   Procedure Laterality Date    CT ANGIO NECK  3/28/2017    CT NECK ANGIO W AND WO IV CONTRAST 3/28/2017 [facility] CLINICAL LEGACY    CT HEAD ANGIO W AND WO IV CONTRAST  3/28/2017    CT HEAD ANGIO W AND WO IV CONTRAST 3/28/2017 [facility] CLINICAL LEGACY     MR HEAD ANGIO WO IV CONTRAST  3/28/2018    MR HEAD ANGIO WO IV CONTRAST 3/28/2018 [facility] ANCILLARY LEGACY    OTHER SURGICAL HISTORY  05/27/2022    Pacemaker insertion    OTHER SURGICAL HISTORY  05/22/2017    Craniotomy Tumor Removal - Complete    OTHER SURGICAL HISTORY  07/01/2019    Colonoscopy complete for polypectomy    OTHER SURGICAL HISTORY  07/01/2019    Endoscopy    TONSILLECTOMY  05/15/2014    Tonsillectomy         CURRENT MEDICATIONS       Previous Medications    AMLODIPINE (NORVASC) 5 MG TABLET    Take 0.5 tablets (2.5 mg) by mouth once daily.    ASCORBIC ACID (VITAMIN C ORAL)    Take 1 tablet by mouth once daily. CHEW 1 TABLET DAILY    ASPIRIN 81 MG EC TABLET    Take 1 tablet (81 mg) by mouth once daily.    BIOTIN/FOLIC AC/VIT BCOMP,C/ZN (BIOTIN FORTE ORAL)    Take 1 capsule by mouth once daily.    BRIMONIDINE (ALPHAGAN P) 0.2 % OPHTHALMIC SOLUTION    Administer 1 drop into both eyes 2 times a day.    CETIRIZINE (ZYRTEC) 10 MG TABLET    Take 1 tablet (10 mg) by mouth once daily.    CHOLECALCIFEROL (VITAMIN D-3) 25 MCG (1000 UT) TABLET    Take 2 tablets (50 mcg) by mouth once daily.    COENZYME Q-10 10 MG CAPSULE    Take 1 tablet by mouth once daily.    EZETIMIBE (ZETIA) 10 MG TABLET    Take 1 tablet (10 mg) by mouth once daily.    FAMOTIDINE (PEPCID) 40 MG TABLET    Take 1 tablet (40 mg) by mouth once daily.    FERROUS SULFATE 325 (65 FE) MG TABLET    Take 1 tablet (65 mg of iron) by mouth once daily.    FISH OIL CONCENTRATE (OMEGA-3) 120-180 MG CAPSULE    Take 1 capsule (1 g) by mouth once daily.    KETOCONAZOLE (NIZORAL) 2 % SHAMPOO    Apply topically 3 times a week.    LATANOPROST (XALATAN) 0.005 % OPHTHALMIC SOLUTION    Administer 1 drop into both eyes once daily.    METOPROLOL SUCCINATE XL (TOPROL-XL) 25 MG 24 HR TABLET    Take 1 tablet (25 mg) by mouth once daily.    MULTIVITAMIN (MULTIPLE VITAMINS) TABLET    Take 1 tablet by mouth once daily.    RED YEAST RICE 600 MG TABLET    Take 1 tablet by  mouth once daily.    SEMGLEE,INSULIN GLARG-YFGN, UNIT/ML (3 ML) INSULIN PEN    Inject 13 Units as directed once daily at bedtime.    SODIUM BICARBONATE 650 MG TABLET    Take 1 tablet (650 mg) by mouth 3 times a day.    TRIAMCINOLONE (KENALOG) 0.1 % CREAM    Apply 1 Application topically 2 times a day as needed.       ALLERGIES     Adalimumab, Amoxicillin, Metformin, Iodinated contrast media, Iodine, Milk containing products (dairy), Shellfish containing products, Statins-hmg-coa reductase inhibitors, Sulfa (sulfonamide antibiotics), Ciprofloxacin, Iodides, Latex, and Penicillins    FAMILY HISTORY       Family History   Problem Relation Name Age of Onset    Other (adopted child) Mother      Other (adopted child) Father            SOCIAL HISTORY       Social History     Socioeconomic History    Marital status:      Spouse name: None    Number of children: None    Years of education: None    Highest education level: None   Occupational History    None   Tobacco Use    Smoking status: Never    Smokeless tobacco: Never   Vaping Use    Vaping Use: Never used   Substance and Sexual Activity    Alcohol use: Not Currently    Drug use: Never    Sexual activity: None   Other Topics Concern    None   Social History Narrative    None     Social Determinants of Health     Financial Resource Strain: Low Risk  (10/17/2023)    Overall Financial Resource Strain (CARDIA)     Difficulty of Paying Living Expenses: Not hard at all   Food Insecurity: Not on file   Transportation Needs: No Transportation Needs (10/17/2023)    PRAPARE - Transportation     Lack of Transportation (Medical): No     Lack of Transportation (Non-Medical): No   Physical Activity: Not on file   Stress: Not on file   Social Connections: Not on file   Intimate Partner Violence: Not on file   Housing Stability: Low Risk  (10/17/2023)    Housing Stability Vital Sign     Unable to Pay for Housing in the Last Year: No     Number of Places Lived in the Last  Year: 0     Unstable Housing in the Last Year: No       SCREENINGS                        PHYSICAL EXAM    (up to 7 for level 4, 8 or more for level 5)     ED Triage Vitals [11/24/23 1326]   Temp Heart Rate Resp BP   36.8 °C (98.2 °F) 68 18 177/72      SpO2 Temp Source Heart Rate Source Patient Position   99 % Temporal Monitor Sitting      BP Location FiO2 (%)     Right arm --       Physical Exam  Constitutional:       Appearance: Normal appearance.      Comments: Pt unable to move on own, req assistance to move to bed   HENT:      Head: Normocephalic and atraumatic.      Nose: Nose normal.   Eyes:      Extraocular Movements: Extraocular movements intact.      Conjunctiva/sclera: Conjunctivae normal.   Cardiovascular:      Rate and Rhythm: Normal rate and regular rhythm.   Pulmonary:      Effort: Pulmonary effort is normal.      Breath sounds: Normal breath sounds.   Musculoskeletal:      Cervical back: Normal range of motion.   Skin:     Findings: Lesion present.      Comments: Chronic venous stasis ulcers appreciated on LLE   Neurological:      General: No focal deficit present.      Mental Status: She is alert and oriented to person, place, and time.   Psychiatric:         Mood and Affect: Mood normal.         Behavior: Behavior normal.          DIAGNOSTIC RESULTS     LABS:  Labs Reviewed   COMPREHENSIVE METABOLIC PANEL - Abnormal       Result Value    Glucose 162 (*)     Sodium 139      Potassium 4.5      Chloride 105      Bicarbonate 25      Anion Gap 14      Urea Nitrogen 39 (*)     Creatinine 2.15 (*)     eGFR 23 (*)     Calcium 8.4 (*)     Albumin 3.1 (*)     Alkaline Phosphatase 97      Total Protein 6.4      AST 66 (*)     Bilirubin, Total 0.4      ALT 22     CBC WITH AUTO DIFFERENTIAL - Abnormal    WBC 12.5 (*)     nRBC 0.0      RBC 3.54 (*)     Hemoglobin 10.2 (*)     Hematocrit 33.8 (*)     MCV 96      MCH 28.8      MCHC 30.2 (*)     RDW 16.4 (*)     Platelets 450      Neutrophils % 72.1      Immature  Granulocytes %, Automated 1.1 (*)     Lymphocytes % 17.0      Monocytes % 6.7      Eosinophils % 2.7      Basophils % 0.4      Neutrophils Absolute 9.01 (*)     Immature Granulocytes Absolute, Automated 0.14      Lymphocytes Absolute 2.12      Monocytes Absolute 0.84 (*)     Eosinophils Absolute 0.34      Basophils Absolute 0.05     B-TYPE NATRIURETIC PEPTIDE - Abnormal     (*)     Narrative:        <100 pg/mL - Heart failure unlikely  100-299 pg/mL - Intermediate probability of acute heart                  failure exacerbation. Correlate with clinical                  context and patient history.    >=300 pg/mL - Heart Failure likely. Correlate with clinical                  context and patient history.    BNP testing is performed using different testing methodology at [facility] than at other [facility] hospitals. Direct result comparisons should only be made within the same method.      TISSUE/WOUND CULTURE/SMEAR   BLOOD CULTURE   BLOOD CULTURE       All other labs were within normal range or not returned as of this dictation.    Imaging  XR chest 1 view   Final Result   No acute cardiopulmonary process is evident.        MACRO:   None        Signed by: Amrit Albright 11/24/2023 4:43 PM   Dictation workstation:   [id number]           Procedures  Procedures     EMERGENCY DEPARTMENT COURSE/MDM:     Diagnoses as of 11/24/23 1735   Chronic cutaneous venous stasis ulcer (CMS/HCC)        Medical Decision Making  74yo F with multiple co morbidities and previous admission for rash and AIN 2/2 amoxicillin use presenting for weakness and weeping RLE wound. Pt has multiple wounds that seem to be 2/2 chronic lymphedema or chronic venous stasis. Wounds were cultured at bedside. CBC and CMP were drawn earlier today, renal function improved from previous discharge with Cr now at 2.15 and BUN at 39. CBC revealed mildly elevated WBC at 12.5. CXR ordered given pts fluid overload status as well as BNP. Social  work consulted from ER for placement, pt unable to be placed in ER. Spoke to pharmacy in ER for abx options given allergies, decision made to give pt rocephin in ER for wound. Pt to be admitted for further workup and evaluation with eventual placement into rehab facility.        Patient and or family in agreement and understanding of treatment plan.  All questions answered.      I reviewed the case with the attending ED physician. The attending ED physician agrees with the plan. Patient and/or patient´s representative was counseled regarding labs, imaging, likely diagnosis, and plan. All questions were answered.    ED Medications administered this visit:    Medications   cefTRIAXone (Rocephin) IVPB 1 g (1 g intravenous New Bag 11/24/23 1727)       New Prescriptions from this visit:    New Prescriptions    No medications on file       Follow-up:  No follow-up provider specified.      Final Impression:   1. Chronic cutaneous venous stasis ulcer (CMS/HCC)          (Please note that portions of this note were completed with a voice recognition program.  Efforts were made to edit the dictations but occasionally words are mis-transcribed.)     Zhang Parson DO  Resident  11/24/23 1736       Zhang Parson DO  Resident  11/24/23 [id number]

## 2023-11-24 NOTE — PROGRESS NOTES
EDPD Note: Rapid Result Review    Reviewed Mrs. Kria Solo 's chart regarding a contaminated urine culture  culture/result that was taken during their recent emergency room visit. The patient was not told about these results prior to leaving the emergency department. Therefore, patient was contacted and given proper education. Spoke with patient's , who reports that the patient had just left the house to be transported to [facility] via EMS.  reports patient is not experiencing any urinary symptoms at this time.    Urine Culture  Order: [id number] - Reflex for Order [id number]  Collected 11/21/2023 14:12     Status: Final result     Visible to patient: Yes (not seen)     Specimen Information: Clean Catch/Voided; Urine     Urine Culture Multiple organisms present, probable contamination. Repeat culture if clinically indicated. Abnormal             Resulting Agency: [facility]           Specimen Collected: 11/21/23 14:12 Last Resulted: 11/23/23 09:10              No further follow up needed from EDPD Team.     If there are any other questions for the ED Post-Discharge Follow Up Team, please contact [phone]. Fax: [phone].    Tessie Carpenter, PharmD, [facility]  PGY1 Community Resident   Jeanine

## 2023-11-24 NOTE — HOSPITAL COURSE
A 73 y.o. female with a pmhx chronic kidney disease stage IIIb/IV (follows with Dr. Porras) paroxysmal atrial fibrillation (AC), HTN, coronary artery disease, type 2 diabetes complicated with diabetic nephropathy, history of mitral valve endocarditis in 2017, meningioma resection in 2014, CVA with lacunar infarct, septic emboli, Chronic lymphedema for which patient uses lymphedema pump presenting to the ER after being seen here 3 days ago for similar complaints generalized weakness and chronic venosus stasis/wound/ulcers. Of note, patient presented to the ED on 10/22/2023 for increasing bouts of diarrhea along with diffuse rash secondary to antibiotic allergy. During that hospital stay (10/23-10/27); ID consulted, believed rash likely 2/2 allergic reaction to her amoxicillin. Hospitalization was further complicated with AIN and diarrhrea. Patient was instructed to continue sodium bicarb 650 mg twice daily until seen by her nephrologist, she is supposed to stop her diuretics until diarrhea resolves (indication to restart diuretics). Prior to discharge, abx were discontinued and pt went to acute rehab on 10/27/23. Patient reports she was returned home form acute rehab from [facility] on 10/17/23. Patient presents to the hospital with concern for generalized weakness and requesting placement. In the ED;  CBC: WBC 12.5; HG: 10.2, CMP: Glucose 162, BUN/CR 39/2.15 (Baseline: 1.5-1.9); BNP: 421, otherwise unremarkable. During admission, patient did have a bump in her Creatine; likely from pre-renal under the setting of decrease fluids. Given patient chronic diarrhea, C.diff was ordered. Cdiff PCR positive, EIA toxins negative. No need to treat w/oral vanc given patient had no leukocytosis, abd pain or worsening clinically signs. Also, during hospital stay patient had swelling of her arm left likely related to IV placement and receiving IV fluids in that arm. Doppler US was ordered. PT/OT eval patient and recommend  Moderate intensity level of continued care. Patient was suppose to be discharge on 23 however it was discontinued given patient was tachycardic with 130's. Patient did not have any symptoms while in the ambulance. Patient was brought back. EKG was done which was similar to prior EKG's (sinus tachycardia). Labs were done; similar to morning labs with improved of kidney function wit ha CR of 2.68 to 2.54. Potassium 3.7 and phos 3.5. CBC: no WBC count,h.8 (baseline) and plt's 334. Interventions done overnight by the night team include 1 L of IV fluids given concern for dehydration.  Patient was also given IV lopressor 5mg x2 which improved pt heart to 90's. Patient patient was sinus tachycardia concern for underlying infection. Chest xray returned back revealing small pleural effusion with concerns for pneumonia. Reviewed previous ID notes; patient has a trug rash allergy to penicillins, especially amoxicillin. Allergies: also cipro/sulfa. Thus given abx allergies; will avoid cephalosporins (given the cross-reactivity between penicillin and cephalosporins). Patient was started on doxy/azithromycin to cover for CAP on 23.  Discontinued azithromycin on 23. Patient will complete a course of doxycycline 100 BID ( - 12/3/23. Blood cultures 2nd set (23): NTD. Interrogate checked for pacemaker run by patient EP doctor to make sure the report is okay, stated patient has been in and out afib this year. Echo returned back revealed rapid atrial arrhythmia which may influence the estimate of left ventricular function and transvalvular flows. EF: 55-60%. Severe mitral annular calcification.  Patient was also started on hydrocortisone cream 2.5% for rash on central chest, which stable and not worsening. Added Aquaphor for comfort given rash. On the day of discharge, patient was vitally stable, HR < 90's over the past 48 hours. Patient never had symptoms for the CAP.      Medications changes  hydrocortisone cream 2.5% for rash on central chest and Aquaphor. Doxycycline 100 BID (11/29 - 12/3/23) to cover for CAP. Also, for chronic diarrhea, C diff PCR positive, EIA toxins negative, added prn imodium.

## 2023-11-25 NOTE — CARE PLAN
The patient's goals for the shift include      The clinical goals for the shift include Call for assistance with bathroom and movement ladies      Problem: Skin  Goal: Decreased wound size/increased tissue granulation at next dressing change  Outcome: Progressing  Goal: Participates in plan/prevention/treatment measures  Outcome: Progressing  Goal: Prevent/manage excess moisture  Outcome: Progressing  Goal: Prevent/minimize sheer/friction injuries  Outcome: Progressing  Goal: Promote/optimize nutrition  Outcome: Progressing  Goal: Promote skin healing  Outcome: Progressing

## 2023-11-25 NOTE — PROGRESS NOTES
"Kira Solo is a 73 y.o. female on day 1 of admission presenting with Chronic cutaneous venous stasis ulcer (CMS/HCC).    Subjective   Patient reports she slept well last night.  Pt denies any itchiness, new rash, sob or allergic symptoms  No medical complaints this morning     Objective   ROS negative unless stated otherwise in the subjective    Physical Exam  Constitutional:       Appearance: She is obese.   HENT:      Head: Normocephalic and atraumatic.      Nose: Nose normal.      Mouth/Throat:      Mouth: Mucous membranes are moist.   Eyes:      Extraocular Movements: Extraocular movements intact.      Conjunctiva/sclera: Conjunctivae normal.      Pupils: Pupils are equal, round, and reactive to light.   Cardiovascular:      Rate and Rhythm: Normal rate and regular rhythm.   Pulmonary:      Effort: Pulmonary effort is normal.      Breath sounds: Normal breath sounds.   Abdominal:      General: Abdomen is flat. Bowel sounds are normal.      Palpations: Abdomen is soft.   Musculoskeletal:      Cervical back: Normal range of motion and neck supple.   Skin:     Comments: BL LE: venous stasis skin changes of bilateral lower extremities with multiple open wounds/ulcers on anterior right ulcers   Neurological:      General: No focal deficit present.      Mental Status: She is alert and oriented to person, place, and time.     Last Recorded Vitals  Blood pressure 121/78, pulse 71, temperature 36.6 °C (97.9 °F), resp. rate 18, height 1.473 m (4' 10\"), weight 90.7 kg (200 lb), SpO2 100 %.  Intake/Output last 3 Shifts:  I/O last 3 completed shifts:  In: - (0 mL/kg)   Out: 600 (6.6 mL/kg) [Urine:600 (0.2 mL/kg/hr)]  Weight: 90.7 kg     Relevant Results    Scheduled medications  amLODIPine, 2.5 mg, oral, Daily  aspirin, 81 mg, oral, Daily  brimonidine, 1 drop, Both Eyes, BID  ezetimibe, 10 mg, oral, Daily  famotidine, 10 mg, oral, Daily  insulin lispro, 0-5 Units, subcutaneous, TID with meals  latanoprost, 1 drop, Both " Eyes, Nightly  losartan, 25 mg, oral, Daily  metoprolol succinate XL, 25 mg, oral, Daily  nystatin, 1 Application, Topical, BID  rivaroxaban, 15 mg, oral, Daily with evening meal  sodium bicarbonate, 650 mg, oral, BID      Continuous medications     PRN medications  PRN medications: dextrose 10 % in water (D10W), dextrose, glucagon, loperamide     Results for orders placed or performed during the hospital encounter of 11/24/23 (from the past 24 hour(s))   Comprehensive metabolic panel   Result Value Ref Range    Glucose 162 (H) 74 - 99 mg/dL    Sodium 139 136 - 145 mmol/L    Potassium 4.5 3.5 - 5.3 mmol/L    Chloride 105 98 - 107 mmol/L    Bicarbonate 25 21 - 32 mmol/L    Anion Gap 14 10 - 20 mmol/L    Urea Nitrogen 39 (H) 6 - 23 mg/dL    Creatinine 2.15 (H) 0.50 - 1.05 mg/dL    eGFR 23 (L) >60 mL/min/1.73m*2    Calcium 8.4 (L) 8.6 - 10.3 mg/dL    Albumin 3.1 (L) 3.4 - 5.0 g/dL    Alkaline Phosphatase 97 33 - 136 U/L    Total Protein 6.4 6.4 - 8.2 g/dL    AST 66 (H) 9 - 39 U/L    Bilirubin, Total 0.4 0.0 - 1.2 mg/dL    ALT 22 7 - 45 U/L   CBC and Auto Differential   Result Value Ref Range    WBC 12.5 (H) 4.4 - 11.3 x10*3/uL    nRBC 0.0 0.0 - 0.0 /100 WBCs    RBC 3.54 (L) 4.00 - 5.20 x10*6/uL    Hemoglobin 10.2 (L) 12.0 - 16.0 g/dL    Hematocrit 33.8 (L) 36.0 - 46.0 %    MCV 96 80 - 100 fL    MCH 28.8 26.0 - 34.0 pg    MCHC 30.2 (L) 32.0 - 36.0 g/dL    RDW 16.4 (H) 11.5 - 14.5 %    Platelets 450 150 - 450 x10*3/uL    Neutrophils % 72.1 40.0 - 80.0 %    Immature Granulocytes %, Automated 1.1 (H) 0.0 - 0.9 %    Lymphocytes % 17.0 13.0 - 44.0 %    Monocytes % 6.7 2.0 - 10.0 %    Eosinophils % 2.7 0.0 - 6.0 %    Basophils % 0.4 0.0 - 2.0 %    Neutrophils Absolute 9.01 (H) 1.60 - 5.50 x10*3/uL    Immature Granulocytes Absolute, Automated 0.14 0.00 - 0.50 x10*3/uL    Lymphocytes Absolute 2.12 0.80 - 3.00 x10*3/uL    Monocytes Absolute 0.84 (H) 0.05 - 0.80 x10*3/uL    Eosinophils Absolute 0.34 0.00 - 0.40 x10*3/uL     Basophils Absolute 0.05 0.00 - 0.10 x10*3/uL   B-type natriuretic peptide   Result Value Ref Range     (H) 0 - 99 pg/mL   Blood Culture    Specimen: Peripheral Venipuncture; Blood culture   Result Value Ref Range    Blood Culture Loaded on Instrument - Culture in progress    Blood Culture    Specimen: Peripheral Venipuncture; Blood culture   Result Value Ref Range    Blood Culture Loaded on Instrument - Culture in progress    POCT GLUCOSE   Result Value Ref Range    POCT Glucose 141 (H) 74 - 99 mg/dL   CBC and Auto Differential   Result Value Ref Range    WBC 10.0 4.4 - 11.3 x10*3/uL    nRBC 0.0 0.0 - 0.0 /100 WBCs    RBC 3.30 (L) 4.00 - 5.20 x10*6/uL    Hemoglobin 9.5 (L) 12.0 - 16.0 g/dL    Hematocrit 33.1 (L) 36.0 - 46.0 %     80 - 100 fL    MCH 28.8 26.0 - 34.0 pg    MCHC 28.7 (L) 32.0 - 36.0 g/dL    RDW 16.9 (H) 11.5 - 14.5 %    Platelets 388 150 - 450 x10*3/uL    Neutrophils % 79.5 40.0 - 80.0 %    Immature Granulocytes %, Automated 1.4 (H) 0.0 - 0.9 %    Lymphocytes % 9.6 13.0 - 44.0 %    Monocytes % 3.0 2.0 - 10.0 %    Eosinophils % 5.9 0.0 - 6.0 %    Basophils % 0.6 0.0 - 2.0 %    Neutrophils Absolute 7.99 (H) 1.60 - 5.50 x10*3/uL    Immature Granulocytes Absolute, Automated 0.14 0.00 - 0.50 x10*3/uL    Lymphocytes Absolute 0.96 0.80 - 3.00 x10*3/uL    Monocytes Absolute 0.30 0.05 - 0.80 x10*3/uL    Eosinophils Absolute 0.59 (H) 0.00 - 0.40 x10*3/uL    Basophils Absolute 0.06 0.00 - 0.10 x10*3/uL   Comprehensive Metabolic Panel   Result Value Ref Range    Glucose 78 74 - 99 mg/dL    Sodium 144 136 - 145 mmol/L    Potassium 3.8 3.5 - 5.3 mmol/L    Chloride 110 (H) 98 - 107 mmol/L    Bicarbonate 23 21 - 32 mmol/L    Anion Gap 15 10 - 20 mmol/L    Urea Nitrogen 39 (H) 6 - 23 mg/dL    Creatinine 2.07 (H) 0.50 - 1.05 mg/dL    eGFR 25 (L) >60 mL/min/1.73m*2    Calcium 7.8 (L) 8.6 - 10.3 mg/dL    Albumin 2.4 (L) 3.4 - 5.0 g/dL    Alkaline Phosphatase 81 33 - 136 U/L    Total Protein 5.1 (L) 6.4 - 8.2  g/dL    AST 31 9 - 39 U/L    Bilirubin, Total 0.4 0.0 - 1.2 mg/dL    ALT 15 7 - 45 U/L   Magnesium   Result Value Ref Range    Magnesium 2.03 1.60 - 2.40 mg/dL   POCT GLUCOSE   Result Value Ref Range    POCT Glucose 82 74 - 99 mg/dL        XR chest 1 view    Result Date: 11/24/2023  Interpreted By:  Amrit Albright, STUDY: Chest dated  11/24/2023.   INDICATION: Signs/Symptoms:fluid overload   COMPARISON: Chest dated 11/21/2020.   ACCESSION NUMBER(S): [id number]   ORDERING CLINICIAN: TAN ALFARO   TECHNIQUE: One view of the chest.   FINDINGS: Lungs are clear.  No pneumothorax or effusion is evident. The cardiomediastinal silhouette is  enlarged but similar to the prior exam.There is a pulse generator on the left chest wall with leads tracking over the heart.Degenerative change is seen of the spine and shoulders.       No acute cardiopulmonary process is evident.   MACRO: None   Signed by: Amrit Albright 11/24/2023 4:43 PM Dictation workstation:   [id number]    XR chest 1 view    Result Date: 11/21/2023  Interpreted By:  Macy Anderson, STUDY: XR CHEST 1 VIEW;  11/21/2023 12:55 pm   INDICATION: Signs/Symptoms:weak cough.   COMPARISON: 05/19/2022   ACCESSION NUMBER(S): [id number]   ORDERING CLINICIAN: JESSICA TORRES   TECHNIQUE: Portable AP upright   FINDINGS: ICD is present in the left upper chest wall with electrode wires in the right atrium and right ventricle. Cardiac silhouette is upper normal in size. Aorta is atherosclerotic. Small hiatal hernia is noted in midline. Lungs are clear. No acute changes are noted in the osseous structures.       No acute radiographic abnormality is identified   MACRO: None.   Signed by: Macy Anderson 11/21/2023 1:33 PM Dictation workstation:   [id number]          Assessment/Plan   A 73 y.o. female with a pmhx chronic kidney disease stage IIIb/IV (follows with Dr. Porras) paroxysmal atrial fibrillation (Eliquis), HTN, coronary artery disease, type 2 diabetes  complicated with diabetic nephropathy, history of mitral valve endocarditis in 2017, meningioma resection in 2014, CVA with lacunar infarct, septic emboli, Chronic lymphedema for which patient uses lymphedema pump presenting to the ER after being seen here 3 days ago for similar complaints generalized weakness. Of note, patient presented to the ED on 10/22/2023 for increasing bouts of diarrhea along with diffuse rash secondary to antibiotic allergy. During that hospital stay (10/23-10/27); ID consulted, believed rash likely 2/2 allergic reaction to her amoxicillin. Hospitalization was further complicated with AIN and diarrhrea. For placement, consulted PT/OT and CM during this admission     #Generalized Weakness  #Recent COVID Infection (2 weeks)  #Leukocytosis. resolved  #Chronic Lymphedema   #Chronic RT LE Ulcers/Wounds  #Hx of Penicillin allergy   P/E: as above  On admission: CBC: WBC 12.5; HG: 10.2, BNP: 421       In the ED, S/P 1 dose of IV Rocephin (no allergic rxn after)  Plan:  -Consulted PT/OT and CM for placement; pending recs  -Follow up on Blood and wound cultures  -Consulted wound nurse; appreciate recs  -At this time, wound/ulcer look chronic; non-infected; will hold off on abx     #Acute renal failure on CKD, improving   Baseline 1.5-1.9; CR: 2.15 > 2.07 (recovering from recent AIN)  Patient was instructed to continue sodium bicarb 650 mg twice daily until seen by her nephrologist, she is supposed to stop her diuretics until diarrhea resolves (indication to restart diuretics), during her last hospital stay (10/23-10/27)  Plan:  -Continue Bicarb 650 mg PO TID  -Avoid nephrotoxic agents   -Holding home allopurinol  -Encouraged fluid intake   -Follow up outpatient with established nephrologist     #Chronic Diarrhea likely 2/2 adverse drug reaction, improving  Diarrhrea started during last hospital stay, had a negative C.Diff PCR then  Plan:  -Order imodium for her loose stool prn  -Ordered C.Diff PCR;  pending   -Added Nystatin    #IDDM 2  #HTN  #A-fib/bifascicular block  -Electronically atrially paced  #HLD:   #GERD:   #Cellulitis LLE   - Continued home medications, Mild SSI    IVF: None at this time  Diet: diabetic and renal  DVT prophylaxis: xarelto  Consults: PT/OT, Wound Nurse and CM  Code Staus: DNR/DNI no ICU     Dispo: Generalized weakness; consulted Wound nurse, PT/OT and CM for placement. C.Diff pending     Kaiser Toussaint DO  Internal medicine, PGY-3  To be staffed with attending       Principal Problem:    Chronic cutaneous venous stasis ulcer (CMS/HCC)             Norbert Saab DO

## 2023-11-26 NOTE — PROGRESS NOTES
Wound Care Progress Note     Visit Date: 11/25/2023      Patient Name: Kira Solo         MRN: [id number]                Reason for Visit: Recommend wound care        Wound History: Present on admission     Pertinent Labs:   Albumin   Date Value Ref Range Status   11/25/2023 2.4 (L) 3.4 - 5.0 g/dL Final   12/31/2022 3.8 3.4 - 5.0 g/dL Final     Albumin (Data Conversion)   Date Value Ref Range Status   06/09/2021 11.8 mg/dL Final     ALBUMIN (MG/L) IN URINE   Date Value Ref Range Status   07/18/2023 159.5 Not Established mg/L Final           Wound Assessment:  Wound 10/17/23 Venous Ulcer Pretibial Right (Active)   Date First Assessed/Time First Assessed: 10/17/23 1536   Wound Approximate Age at First Assessment (Weeks): 2 weeks  Primary Wound Type: Venous Ulcer  Location: Pretibial  Wound Location Orientation: Right      Assessments 11/25/2023  9:00 PM   Site Assessment Pale;Pink;Fibrinous;White   Caryl-Wound Assessment Maceration   Non-staged Wound Description Full thickness   Wound Length (cm) 9.5 cm   Wound Width (cm) 10 cm   Wound Surface Area (cm^2) 95 cm^2   Wound Depth (cm) 0.2 cm   Wound Volume (cm^3) 19 cm^3   State of Healing Non-healing   Wound Bed Granulation (%) 25 %   Wound Bed Slough (%) 75 %   Margins Attached edges;Well-defined edges   Drainage Description Yellow   Drainage Amount Small   Dressing Abdominal dressing;Silver dressing;Alginate;Kerlix/rolled gauze   Dressing Status Clean;Dry       Inactive Orders   Date Order Priority Status Authorizing Provider   10/22/23 8717 Inpatient Consult to Wound and Ostomy Nurse Routine Discontinued Joseph Linares DO     - Reason for Consult?:    Wound   10/17/23 6134 Inpatient Consult to Wound and Ostomy Nurse Routine Discontinued Rose Riggins MD     - Reason for Consult?:    Wound           NEW: Current dressing of Xeroform gauze, ABD and Kerlix removed. Drainage noted on ABD, There are 4 wounds front of lower extremity all  by narrow  strip of skin. I did note a small ulcer posterior RLL approx 1.0 x 0.5 x 0.2 cm. The measurement provided for ulcers front of leg were all measured as one since they are so close. 75% of the wound covered with thin, adherent white tissue. The skin on posterior area of leg is macerated, intact. Patient denies any pain when I cleansed with NS and 4 x 4 gauze. She stated she last saw her podiatrist at [facility] about Oct 18 th who recommended she come to [facility] because the treatment he wanted for her he did not have at his facility. I applied Aquacel Ag, ABD and Kerlix to wounds.                        Wound 10/17/23 Venous Ulcer Pretibial Right (Active)   Date First Assessed/Time First Assessed: 10/17/23 1536   Wound Approximate Age at First Assessment (Weeks): 2 weeks  Primary Wound Type: Venous Ulcer  Location: Pretibial  Wound Location Orientation: Right   Number of days: 39     Wound 10/17/23 Venous Ulcer Pretibial Right (Active)   Site Assessment Pale;Pink;Fibrinous;White 11/25/23 2100   Caryl-Wound Assessment Maceration 11/25/23 2100   Non-staged Wound Description Full thickness 11/25/23 2100   Wound Length (cm) 9.5 cm 11/25/23 2100   Wound Width (cm) 10 cm 11/25/23 2100   Wound Surface Area (cm^2) 95 cm^2 11/25/23 2100   Wound Depth (cm) 0.2 cm 11/25/23 2100   Wound Volume (cm^3) 19 cm^3 11/25/23 2100   State of Healing Non-healing 11/25/23 2100   Wound Bed Granulation (%) 25 % 11/25/23 2100   Wound Bed Slough (%) 75 % 11/25/23 2100   Margins Attached edges;Well-defined edges 11/25/23 2100   Drainage Description Yellow 11/25/23 2100   Drainage Amount Small 11/25/23 2100   Dressing ABD;Silver dressing;Alginate;Kerlix/rolled gauze 11/25/23 2100   Dressing Status Clean;Dry 11/25/23 2100                   Wound Team Plan: Aquacel Ag, ABD, Kerlix every other day and PRN. I also recommend a podiatry consult.     Belinda Choi RN  11/25/2023  9:26 PM

## 2023-11-26 NOTE — PROGRESS NOTES
"Kira Solo is a 73 y.o. female on day 2 of admission presenting with Chronic cutaneous venous stasis ulcer (CMS/HCC).    Subjective   NAEO. 1-2 episodes of diarrhea since 8PM. No n/v. Does not appear to be drinking well, states only 1 ginger ale yesterday. Otherwise denies sx       Objective     Physical Exam  Vitals and nursing note reviewed.   Constitutional:       General: She is not in acute distress.     Appearance: She is not ill-appearing.   HENT:      Head: Normocephalic and atraumatic.   Eyes:      Extraocular Movements: Extraocular movements intact.      Pupils: Pupils are equal, round, and reactive to light.   Cardiovascular:      Rate and Rhythm: Normal rate and regular rhythm.      Pulses: Normal pulses.      Heart sounds: Normal heart sounds. No murmur heard.  Pulmonary:      Breath sounds: No wheezing, rhonchi or rales.   Abdominal:      General: Abdomen is flat. Bowel sounds are normal. There is no distension.      Palpations: Abdomen is soft. There is no mass.      Tenderness: There is no abdominal tenderness.      Hernia: No hernia is present.   Musculoskeletal:         General: No swelling or deformity.   Skin:     General: Skin is warm and dry.      Capillary Refill: Capillary refill takes 2 to 3 seconds.      Coloration: Skin is not jaundiced.      Findings: No rash.   Neurological:      General: No focal deficit present.      Mental Status: She is oriented to person, place, and time. Mental status is at baseline.   Psychiatric:         Mood and Affect: Mood normal.         Behavior: Behavior normal.         Judgment: Judgment normal.         Last Recorded Vitals  Blood pressure 95/54, pulse 70, temperature 36.2 °C (97.2 °F), resp. rate 16, height 1.473 m (4' 10\"), weight 90.7 kg (200 lb), SpO2 96 %.  Intake/Output last 3 Shifts:  I/O last 3 completed shifts:  In: 120 (1.3 mL/kg) [P.O.:120]  Out: 600 (6.6 mL/kg) [Urine:600 (0.2 mL/kg/hr)]  Weight: 90.7 kg     Relevant Results  Results for " orders placed or performed during the hospital encounter of 11/24/23 (from the past 24 hour(s))   POCT GLUCOSE   Result Value Ref Range    POCT Glucose 130 (H) 74 - 99 mg/dL   C. difficile, PCR    Specimen: Stool   Result Value Ref Range    C. difficile, PCR Detected (A) Not Detected   Clostridium Difficile EIA    Specimen: Stool   Result Value Ref Range    C. difficile (Toxin A/B) Negative Negative, Indeterminate   POCT GLUCOSE   Result Value Ref Range    POCT Glucose 189 (H) 74 - 99 mg/dL   Comprehensive Metabolic Panel   Result Value Ref Range    Glucose 111 (H) 74 - 99 mg/dL    Sodium 142 136 - 145 mmol/L    Potassium 3.7 3.5 - 5.3 mmol/L    Chloride 109 (H) 98 - 107 mmol/L    Bicarbonate 23 21 - 32 mmol/L    Anion Gap 14 10 - 20 mmol/L    Urea Nitrogen 47 (H) 6 - 23 mg/dL    Creatinine 2.62 (H) 0.50 - 1.05 mg/dL    eGFR 19 (L) >60 mL/min/1.73m*2    Calcium 7.5 (L) 8.6 - 10.3 mg/dL    Albumin 2.3 (L) 3.4 - 5.0 g/dL    Alkaline Phosphatase 65 33 - 136 U/L    Total Protein 4.6 (L) 6.4 - 8.2 g/dL    AST 24 9 - 39 U/L    Bilirubin, Total 0.4 0.0 - 1.2 mg/dL    ALT 12 7 - 45 U/L   CBC   Result Value Ref Range    WBC 10.7 4.4 - 11.3 x10*3/uL    nRBC 0.0 0.0 - 0.0 /100 WBCs    RBC 2.94 (L) 4.00 - 5.20 x10*6/uL    Hemoglobin 8.4 (L) 12.0 - 16.0 g/dL    Hematocrit 28.7 (L) 36.0 - 46.0 %    MCV 98 80 - 100 fL    MCH 28.6 26.0 - 34.0 pg    MCHC 29.3 (L) 32.0 - 36.0 g/dL    RDW 17.0 (H) 11.5 - 14.5 %    Platelets 328 150 - 450 x10*3/uL   POCT GLUCOSE   Result Value Ref Range    POCT Glucose 109 (H) 74 - 99 mg/dL   POCT GLUCOSE   Result Value Ref Range    POCT Glucose 135 (H) 74 - 99 mg/dL          Assessment/Plan   Principal Problem:    Chronic cutaneous venous stasis ulcer (CMS/HCC)    Kira Solo is a 73 y.o. female with a pmhx chronic kidney disease stage IIIb/IV (follows with Dr. Porras) paroxysmal atrial fibrillation on Xarelto, HLD, gout, HTN, coronary artery disease, IDT2DM, history of mitral valve endocarditis  in 2017, meningioma resection in 2014, lacunar CVA without deficits, Chronic lymphedema for which patient uses lymphedema pump presenting to the ER after being seen here 3 days ago for similar complaints generalized weakness and chronic venosus stasis/wound/ulcers who presents for weakness. DC to home from SNF about 1 week ago. Admitted for need for placement. Of note, admitted 10/23-10/27 for AIN and rash due to amoxicillin allergy.    Ambulatory dysfunction, need for placement  Nonoliguric ALEKSEY on CKD3- likely pre-renal in setting of decreased fluid intake  Mild dehydration  Acute leukocytosis- likely reactive, resolved  Chronic Lymphedema   Chronic RT LE Ulcers/Wounds  -Scr worsened today to 2.62 (from 2.0) in setting of recent AIN. Will give 1L fluids today, encourage oral intake  -Encourage nursing to wrap lower extremity w/ Ace bandages to encourage mobilization of fluid as well as sit up in chair  -RLE wounds appear noninfected, wound care RN following  -No indication to consult podiatry as was seeing podiatrist for toe nail maintenance, can FU w/ Dr. Cain OP  -Cont home meds of sodium bicarb    Chronic Diarrhea  -Cdiff PCR positive, EIA toxins negative  -No need for isolation precautions  -No need to treat w/oral vanc- if diarrhea > 5-6 liquid stools/day, leukocytosis, abd pain or worsening clinically signs will consider treatment    IDT2DM (Last A1C 7.2% 10/23)  -Acchuchecks before meals and bedtime  -Holding Semglee  -Mild SSI    Chronic conditions:  History of mitral valve endocarditis in 2017  Lacunar CVA without deficits  HLD  Gout  HTN  -Cont home meds     IVF: 125cc/hrx1L  Tele: not indicated  Diet: diabetic  Consults: wound care  DVT ppx: Xarelto, EDGARDs  Code status: DNR DNI    Dispo: Medically stable for DC, pending list of SNF choices    Assessment and plan discussed with attending.   Fernanda Baum DO

## 2023-11-26 NOTE — NURSING NOTE
0900- Dr Baum up on floor and I inquired about podiatry consult as wound nurse was requesting one yesterday.    EOS- No acute changes throughout the shift. A bag of IV fluids given. Patient repositioned every 2 hours. No complaints of pain. Patient refusing to put ace wraps on this evening but will wear them in the morning. Safety maintained and call light within reach.

## 2023-11-26 NOTE — NURSING NOTE
Patient had wound care come last night and they completed a dressing change on chronic RLE venous ulcer. Wound care also mentioned having a consult for podiatry put in. Patient vital signs stable. Patient safety maintained.

## 2023-11-26 NOTE — CARE PLAN
The patient's goals for the shift include      The clinical goals for the shift include pt will use call light for assistance this shift      Problem: Skin  Goal: Decreased wound size/increased tissue granulation at next dressing change  Outcome: Progressing  Goal: Participates in plan/prevention/treatment measures  Outcome: Progressing  Goal: Prevent/manage excess moisture  Outcome: Progressing  Goal: Prevent/minimize sheer/friction injuries  Outcome: Progressing  Goal: Promote/optimize nutrition  Outcome: Progressing  Goal: Promote skin healing  Outcome: Progressing

## 2023-11-27 NOTE — PROGRESS NOTES
Occupational Therapy    Occupational Therapy    Evaluation    Patient Name: Kira Solo  MRN: [id number]  Today's Date: 11/27/2023  Time Calculation  Start Time: 1109  Stop Time: 1126  Time Calculation (min): 17 min    Assessment  IP OT Assessment  OT Assessment:  (Pt. benefits from moderate intensity therapy to increase safety and independence in ADLs and mobility to return to PLOF)  Prognosis: Fair  Evaluation/Treatment Tolerance: Patient limited by fatigue  End of Session Communication: Bedside nurse  End of Session Patient Position: Up in chair, Alarm on    Plan:  OT Frequency: 3 times per week  OT Discharge Recommendations: Moderate intensity level of continued care  OT Recommended Transfer Status: Assist of 2  OT - OK to Discharge: Yes (Next level of care when cleared by medical staff)    Subjective   Per EMR:  72yo F with PMHx of chronic kidney disease stage IIIb/IV follows with Dr. Porras, paroxysmal atrial fibrillation, hypertension, coronary artery disease, type 2 diabetes complicated with diabetic nephropathy, history of mitral valve endocarditis in 2017, meningioma resection in 2014, CVA with lacunar infarct, septic emboli, Chronic lymphedema for which patient uses lymphedema pump presenting to the ER after being seen here 3 days ago for similar complaints. Pt is endorsing weakness with ambulation as well as a weeping wound of R lower extremity. Pt is also endorsing swelling of BL Lower extremities. Denies any fevers, N/V. She does endorse being in a facility for IV abx for 3 weeks for a wound. She was discharged home Friday. Since then she has difficulty ambulating and her  is unable to support her. She was recently admitted one month ago for ALEKSEY 2/2 presumed AIN and rash from amoxicillin. Pt was discharged to a rehab facility. Claims she was not happy with the care she got there and barely moved around, this made it more difficulty for her to move when she got home. She also has had some LE  swelling  that has been present chronically, she is not sure what the cause of this is. She wad discharged from her last admission on 10/27. Her renal function at that time was BUN 98 and Cr of 3.74. Pt and  both do not feel like she is suitable to go home at this time. They wish for her to go to  a rehab facility, but not to the one she went to last time.   Current Problem:  1. Chronic cutaneous venous stasis ulcer (CMS/HCC)            General:  General  Reason for Referral: ADLs.discharge planning  Referred By:   Family/Caregiver Present: No  Co-Treatment: PT  Co-Treatment Reason: Safety  Prior to Session Communication: Bedside nurse  Patient Position Received: Bed, 3 rail up, Alarm off, not on at start of session    Precautions:  Medical Precautions: Fall precautions           Objective     Cognition:  Overall Cognitive Status: Within Functional Limits  Orientation Level: Oriented X4        Home Living:  Home Living Comments:  (Pt. lives with spouse in apt with walk in shower has shower chair. States has needed assist since becoming weak after return home from SNF.Uses ww or rollator)     Prior Function:  Level of [place]: Needs assistance with ADLs  Receives Help From: Family  ADL Assistance: Needs assistance:       ADL:  Grooming Assistance: Stand by  Grooming Deficit: Setup  LE Dressing Assistance: Maximal  LE Dressing Deficit: Don/doff R sock, Don/doff L sock    Activity Tolerance:  Endurance: Tolerates 10 - 20 min exercise with multiple rests    Bed Mobility/Transfers:   Bed Mobility  Bed Mobility:  (supine to sit max assist x 2)  Transfers  Transfer:  (sit<>stand max assist x 2 with gait belt and ww, chair next to bed for transfer)      Sitting Balance:  Static Sitting Balance  Static Sitting-Balance Support: Bilateral upper extremity supported  Static Sitting-Level of Assistance: Close supervision    Standing Balance:  Static Standing Balance  Static Standing-Balance Support:  Bilateral upper extremity supported  Static Standing-Level of Assistance: Maximum assistance    Hand Function:  Hand Function  Gross Grasp: Functional  Coordination: Functional    Extremities: RUE   RUE : Within Functional Limits and LUE   LUE: Within Functional Limits    Outcome Measures: [facility] Daily Activity  Putting on and taking off regular lower body clothing: Total  Bathing (including washing, rinsing, drying): A lot  Putting on and taking off regular upper body clothing: A little  Toileting, which includes using toilet, bedpan or urinal: Total  Taking care of personal grooming such as brushing teeth: A little  Eating Meals: None  Daily Activity - Total Score: 14          EDUCATION:  Education  Individual(s) Educated: Patient  Education Provided: Fall precautons, Risk and benefits of OT discussed with patient or other, POC discussed and agreed upon  Patient Response to Education: Patient/Caregiver Verbalized Understanding of Information      Goals:   Encounter Problems       Encounter Problems (Active)       Dressings Lower Extremities       STG - Patient to complete lower body dressing min assist       Start:  11/27/23    Expected End:  12/11/23               Grooming       STG - Patient completes grooming SUP       Start:  11/27/23    Expected End:  12/11/23            STG - Patient will tolerate standing 2-4 min to participate in ADLs       Start:  11/27/23    Expected End:  12/11/23                 Transfers       STG - Patient will perform bed mobility min assist       Start:  11/27/23    Expected End:  12/11/23            STG - Patient will perform toilet transfer MOD assist       Start:  11/27/23    Expected End:  12/11/23

## 2023-11-27 NOTE — PROGRESS NOTES
Physical Therapy    Physical Therapy    Physical Therapy Evaluation & Treatment    Patient Name: Kira Solo  MRN: [id number]  Today's Date: 11/27/2023   Time Calculation  Start Time: 1110  Stop Time: 1128  Time Calculation (min): 18 min    Assessment/Plan   Pt requires 24 hour hands on assist for bed mobility, transfers and gait.  Pt requires encouragement to participate and cues for safety and technique.  Pt is easily fatigued.  Pt would benefit from continued, moderate intensity PT to improve independence with all functional mobility.    PT Assessment  PT Assessment Results: Decreased strength, Decreased endurance, Impaired balance, Decreased mobility, Decreased safety awareness  Rehab Prognosis: Fair  End of Session Communication: Bedside nurse  End of Session Patient Position: Up in chair, Alarm on  IP OR SWING BED PT PLAN  Inpatient or Swing Bed: Inpatient  PT Plan  Treatment/Interventions: Bed mobility, Transfer training, Gait training, Balance training, Strengthening, Endurance training, Therapeutic exercise, Therapeutic activity (Pt education)  PT Plan: Skilled PT  PT Frequency: 3 times per week  PT Discharge Recommendations: Moderate intensity level of continued care  PT Recommended Transfer Status: Assist x2, Assistive device  PT - OK to Discharge: OK to discharge from acute PT services to the next level of care when cleared by the medical team.    Current Problem:  Patient Active Problem List   Diagnosis    Adrenal adenoma    Anterior mediastinal tumor    Antiphospholipid antibody positive    Balance problem    Essential (primary) hypertension    Bifascicular block    Bilateral optic nerve atrophy    Candidiasis    Cardiac pacemaker in situ    Cellulitis    Central retinal vein occlusion with macular edema of both eyes    Venous insufficiency (chronic) (peripheral)    Stage 3 chronic kidney disease (CMS/HCC)    Colon polyp    Contusion of left leg    Type 2 diabetes mellitus with unspecified  complications (CMS/HCC)    Contusion of toe of left foot, initial encounter    Edema    Foot pain, bilateral    Frequent falls    Gait, antalgic    Generalized weakness    Glaucoma suspect of both eyes    Gout, unspecified    Hyperlipidemia, unspecified    Hypotension    Iron deficiency    Knee pain, left    Left knee DJD    Lymphedema, not elsewhere classified    Mitral valve disorder    Onychomycosis    Open wound of both lower extremities    Pancreatic cyst    Paroxysmal atrial fibrillation (CMS/HCC)    Psoriasis with arthropathy (CMS/HCC)    Arthritis    Rhinitis    Right homonymous hemianopsia    Sinus bradycardia    Sinus node dysfunction (CMS/HCC)    Syncope    Thymus disorder (CMS/HCC)    Unruptured cerebral aneurysm    Vitamin D deficiency    Weight loss, unintentional    Rash    Xerosis of skin    Central retinal artery occlusion, bilateral    Decreased visual acuity    Drug-induced chronic gout of multiple sites    History of panretinal photocoagulation    Gouty arthritis of toe of left foot    Lymphedema of both lower extremities    Meningioma (CMS/HCC)    Metabolic acidosis    Multiple lacunar infarcts (CMS/HCC)    Neoplasm of uncertain behavior of skin    Neovascular glaucoma, both eyes    Nonproliferative diabetic retinopathy (CMS/HCC)    Obesity, unspecified    Obstructive sleep apnea syndrome    Optic papillitis of right eye    Partial thickness burn of left lower leg    Pseudophakia    Psoriasis, unspecified    Pulsatile tinnitus of right ear    Pure hypercholesterolemia    Rosacea    Acute and subacute infective endocarditis    Systolic murmur    Vision impairment    CKD stage 3 due to type 2 diabetes mellitus (CMS/HCC)    Cerebral infarction due to embolism of unspecified cerebral artery (CMS/HCC)    Difficulty in walking, not elsewhere classified    Gastro-esophageal reflux disease without esophagitis    Muscle weakness (generalized)    Nonrheumatic mitral (valve) insufficiency    Other sequelae  of cerebral infarction    Streptococcus, group b, as the cause of diseases classified elsewhere    Chronic atrial fibrillation (CMS/HCC)    Wound infection    Acute renal failure, unspecified acute renal failure type (CMS/HCC)    Hypoglycemia    Chronic cutaneous venous stasis ulcer (CMS/HCC)       Subjective     General Visit Information:  General  Reason for Referral: weakness, difficulty walking  Referred By: Dr. Johns  Co-Treatment: OT  Co-Treatment Reason: Co-treatment to maximize functional independence and for safety.  Prior to Session Communication: Bedside nurse  Patient Position Received: Bed, 3 rail up    Home Living:  Home Living  Bathroom Equipment:  (front wheeled walker, rollator)  Home Living Comments: Pt resides in an apartment with her , no stairs, walk in shower with seat.    Prior Level of Function:  Prior Function Per Pt/Caregiver Report  Prior Function Comments: Pt reporsts since discharge from SNF about 1 week ago she requires assist for tansfers and gait with a wheeled walker, assist for ADLs.  Pt reports her spouse has Parkinson's and has difficulty assisting her.    Precautions:  Precautions  Medical Precautions: Fall precautions    Vital Signs:     Objective     Pain:  Pain Assessment  Pain Assessment: 0-10  Pain Score: 0 - No pain    Cognition:  Cognition  Overall Cognitive Status: Within Functional Limits  Orientation Level: Oriented X4    General Assessments:                                Functional Assessments:     Bed Mobility  Bed Mobility:  (supine to sit max assist of 2)  Transfers  Transfer:  (sit<>stand max assist of 2)  Ambulation/Gait Training  Ambulation/Gait Training Performed:  (several steps from bed to chair with front wheeled walker and max assist of 2, assist to advance walker.)          Extremity/Trunk Assessments:        RLE   RLE :  (R LE ROM WFL)  LLE   LLE :  (L LE ROM WFL)         Outcome Measures:  [facility] Basic Mobility  Turning from your back to your  side while in a flat bed without using bedrails: A lot  Moving from lying on your back to sitting on the side of a flat bed without using bedrails: A lot  Moving to and from bed to chair (including a wheelchair): A lot  Standing up from a chair using your arms (e.g. wheelchair or bedside chair): A lot  To walk in hospital room: A lot  Climbing 3-5 steps with railing: Total  Basic Mobility - Total Score: 11                            Goals:  Encounter Problems       Encounter Problems (Active)       PT Problem       PT Goal 1 (Progressing)       Start:  11/27/23    Expected End:  12/11/23       Pt able to perform bed mobility with mod assist.           PT Goal 2 (Progressing)       Start:  11/27/23    Expected End:  12/11/23       Pt able to complete all transfers with mod assist.            PT Goal 3 (Progressing)       Start:  11/27/23    Expected End:  12/11/23       Pt able to ambulate 10 feet with front wheeled walker and mod assist of 2.                  Education Documentation  Precautions, taught by Shannon Tello PT at 11/27/2023  1:12 PM.  Learner: Patient  Readiness: Acceptance  Method: Explanation  Response: Needs Reinforcement    Mobility Training, taught by Shannon Tello, PT at 11/27/2023  1:12 PM.  Learner: Patient  Readiness: Acceptance  Method: Explanation  Response: Needs Reinforcement    Education Comments  No comments found.

## 2023-11-27 NOTE — NURSING NOTE
EOS- No acute changes throughout the shift. Patient up in chair this afternoon. IV fluid bolus given. Covid swab done. Urine sent done. Safety maintained and call light within reach.

## 2023-11-27 NOTE — PROGRESS NOTES
Per  note, patient is requesting referrals to [facility], [facility] and [facility]. Patient discharged from hospital on 10/27 to [facility] and has been home for about one week prior to this admission. Referral sent, pending acceptance. Therapy evals are pending. Will need to send therapy evals once available and follow up with patient for FOC.      1157- Met with patient at bedside to update that [facility] is able to accept. Per patient, [facility] is her FOC. Will need to send therapy evals and covid test once available. Nursing aware that pt will need covid test. Received a call from [facility] that patient is currently active with them for PT/OT and nursing for wound care.     1421- Therapy evals sent to [facility]. Will need to send covid test prior to d/c.     Lin Tomas RN

## 2023-11-27 NOTE — NURSING NOTE
EOS:     1900- assumed care of pt at this time    2130- pm medications administered at this time without issue. Pt without reports of pain/discomfort. Pt bathed at this time with Niastatin powder administered. Pt readjusted for comfort at this time. Will continue to monitor.     0000- pt with BM via BP at this time with two assist. New dressing on ulcer on right lower calf.     0200- pt with generalized aching of 6/10. Pt requesting tylenol, page to teaching at this time with pt request.     0700- pt with no acute events this shift. Pt remained on RA, vitals stable, reports of discomfort medicated per order. Pt turned every two hours this shift with 1-2 assist. Pt wit new dressings on lower right calf wound and left buttocks this shift. Pt with call light within reach at all times, bed alarm on, safety maintained.

## 2023-11-27 NOTE — CARE PLAN
The patient's goals for the shift include      The clinical goals for the shift include see care plan      Problem: Skin  Goal: Decreased wound size/increased tissue granulation at next dressing change  Outcome: Progressing  Flowsheets (Taken 11/27/2023 0506)  Decreased wound size/increased tissue granulation at next dressing change: Promote sleep for wound healing  Goal: Participates in plan/prevention/treatment measures  Outcome: Progressing  Flowsheets (Taken 11/27/2023 0506)  Participates in plan/prevention/treatment measures: Elevate heels  Goal: Prevent/manage excess moisture  Outcome: Progressing  Flowsheets (Taken 11/27/2023 0506)  Prevent/manage excess moisture: Cleanse incontinence/protect with barrier cream  Goal: Prevent/minimize sheer/friction injuries  Outcome: Progressing  Flowsheets (Taken 11/27/2023 0506)  Prevent/minimize sheer/friction injuries: Use pull sheet  Goal: Promote/optimize nutrition  Outcome: Progressing  Flowsheets (Taken 11/27/2023 0506)  Promote/optimize nutrition: Monitor/record intake including meals  Goal: Promote skin healing  Outcome: Progressing  Flowsheets (Taken 11/27/2023 0506)  Promote skin healing: Turn/reposition every 2 hours/use positioning/transfer devices     Problem: Pain - Adult  Goal: Verbalizes/displays adequate comfort level or baseline comfort level  Outcome: Progressing     Problem: Safety - Adult  Goal: Free from fall injury  Outcome: Progressing     Problem: Discharge Planning  Goal: Discharge to home or other facility with appropriate resources  Outcome: Progressing     Problem: Chronic Conditions and Co-morbidities  Goal: Patient's chronic conditions and co-morbidity symptoms are monitored and maintained or improved  Outcome: Progressing     Problem: Diabetes  Goal: Achieve decreasing blood glucose levels by end of shift  Outcome: Progressing  Goal: Increase stability of blood glucose readings by end of shift  Outcome: Progressing  Goal: Decrease in ketones  present in urine by end of shift  Outcome: Progressing  Goal: Maintain electrolyte levels within acceptable range throughout shift  Outcome: Progressing     Problem: Pain  Goal: Takes deep breaths with improved pain control throughout the shift  Outcome: Progressing  Goal: Turns in bed with improved pain control throughout the shift  Outcome: Progressing  Goal: Walks with improved pain control throughout the shift  Outcome: Progressing  Goal: Performs ADL's with improved pain control throughout shift  Outcome: Progressing  Goal: Participates in PT with improved pain control throughout the shift  Outcome: Progressing  Goal: Free from opioid side effects throughout the shift  Outcome: Progressing  Goal: Free from acute confusion related to pain meds throughout the shift  Outcome: Progressing

## 2023-11-27 NOTE — PROGRESS NOTES
"Kira Solo is a 73 y.o. female on day 3 of admission presenting with Chronic cutaneous venous stasis ulcer (CMS/HCC).    Subjective   Patient was seen and examined at bedside.  Patient reports she has not got out of bed since admission  Patient states she had 2 watery stools yesterday.   Pt denies any sob, cp, nausea, vomiting or abd pain    Objective   Constitutional:       Appearance: She is obese.   HENT:      Head: Normocephalic and atraumatic.      Nose: Nose normal.      Mouth/Throat:      Mouth: Mucous membranes are moist.   Eyes:      Extraocular Movements: Extraocular movements intact.      Conjunctiva/sclera: Conjunctivae normal.      Pupils: Pupils are equal, round, and reactive to light.   Cardiovascular:      Rate and Rhythm: Normal rate and regular rhythm.   Pulmonary:      Effort: Pulmonary effort is normal.      Breath sounds: Normal breath sounds.   Abdominal:      General: Abdomen is flat. Bowel sounds are normal.      Palpations: Abdomen is soft.   Musculoskeletal:      Cervical back: Normal range of motion and neck supple.   Skin:     Comments: BL LE: venous stasis skin changes of bilateral lower extremities with multiple open wounds/ulcers on anterior right ulcers   Neurological:      General: No focal deficit present.      Mental Status: She is alert and oriented to person, place, and time.      Last Recorded Vitals  Blood pressure 123/67, pulse 72, temperature 37.3 °C (99.1 °F), temperature source Temporal, resp. rate 18, height 1.473 m (4' 10\"), weight 90.7 kg (200 lb), SpO2 94 %.  Intake/Output last 3 Shifts:  I/O last 3 completed shifts:  In: 960 (10.6 mL/kg) [P.O.:960]  Out: 200 (2.2 mL/kg) [Urine:200 (0.1 mL/kg/hr)]  Weight: 90.7 kg     Relevant Results  Results for orders placed or performed during the hospital encounter of 11/24/23 (from the past 24 hour(s))   POCT GLUCOSE   Result Value Ref Range    POCT Glucose 109 (H) 74 - 99 mg/dL   POCT GLUCOSE   Result Value Ref Range    POCT " Glucose 135 (H) 74 - 99 mg/dL   POCT GLUCOSE   Result Value Ref Range    POCT Glucose 158 (H) 74 - 99 mg/dL   POCT GLUCOSE   Result Value Ref Range    POCT Glucose 170 (H) 74 - 99 mg/dL   POCT GLUCOSE   Result Value Ref Range    POCT Glucose 92 74 - 99 mg/dL          Assessment/Plan   Principal Problem:    Chronic cutaneous venous stasis ulcer (CMS/HCC)    Kira Solo is a 73 y.o. female with a pmhx chronic kidney disease stage IIIb/IV (follows with Dr. Porras) paroxysmal atrial fibrillation on Xarelto, HLD, gout, HTN, coronary artery disease, IDT2DM, history of mitral valve endocarditis in 2017, meningioma resection in 2014, lacunar CVA without deficits, Chronic lymphedema for which patient uses lymphedema pump presenting to the ER after being seen here 3 days ago for similar complaints generalized weakness and chronic venosus stasis/wound/ulcers who presents for weakness. DC to home from SNF about 1 week ago. Admitted for need for placement. Of note, admitted 10/23-10/27 for AIN and rash due to amoxicillin allergy.    Ambulatory dysfunction, need for placement  Nonoliguric ALEKSEY on CKD3- likely pre-renal in setting of decreased fluid intake  Mild dehydration  Acute leukocytosis- likely reactive, resolved  Chronic Lymphedema   Chronic RT LE Ulcers/Wounds  -Baseline 1.5-1.9; elevated at admission 2.15; today 2.75 in setting of recent AIN.   Plan:  -Encourage oral intake and order IV Fluids NS 1L  -At this time, wound/ulcer look chronic; non-infected; will hold off on abx; wound care RN following  -Encourage nursing to wrap lower extremity w/ Ace bandages to encourage mobilization of fluid as well as sit up in chair  -Outpatient follow up with established podiatry Dr. Cain   -Cont home meds of sodium bicarb    Chronic Diarrhea  -Cdiff PCR positive, EIA toxins negative  -No need for isolation precautions  -No need to treat w/oral vanc- if diarrhea > 5-6 liquid stools/day, leukocytosis, abd pain or worsening  clinically signs will consider treatment    IDT2DM (Last A1C 7.2% 10/23)  -Acchuchecks before meals and bedtime  -Holding Semglee  -Mild SSI    Chronic conditions:  History of mitral valve endocarditis in 2017  Lacunar CVA without deficits  HLD  Gout  HTN  -Cont home meds     IVF: as needed  Tele: not indicated  Diet: diabetic  Consults: wound care  DVT ppx: Xarelto, SCDs  Code status: DNR DNI    Dispo: Medically stable for DC, pending list of SNF choices. PT/OT eval pending     Kaiser Toussaint DO  Internal Medicine, PGY3  Case and plan discussed with attending     Norbert Saab DO

## 2023-11-28 NOTE — CARE PLAN
The patient's goals for the shift include      The clinical goals for the shift include see care plan      Problem: Skin  Goal: Decreased wound size/increased tissue granulation at next dressing change  Outcome: Progressing  Flowsheets (Taken 11/28/2023 0557)  Decreased wound size/increased tissue granulation at next dressing change: Promote sleep for wound healing  Goal: Participates in plan/prevention/treatment measures  Outcome: Progressing  Flowsheets (Taken 11/28/2023 0557)  Participates in plan/prevention/treatment measures: Elevate heels  Goal: Prevent/manage excess moisture  Outcome: Progressing  Flowsheets (Taken 11/28/2023 0557)  Prevent/manage excess moisture:   Moisturize dry skin   Cleanse incontinence/protect with barrier cream  Goal: Prevent/minimize sheer/friction injuries  Outcome: Progressing  Flowsheets (Taken 11/28/2023 0557)  Prevent/minimize sheer/friction injuries: Use pull sheet  Goal: Promote/optimize nutrition  Outcome: Progressing  Flowsheets (Taken 11/28/2023 0557)  Promote/optimize nutrition: Monitor/record intake including meals  Goal: Promote skin healing  Outcome: Progressing  Flowsheets (Taken 11/28/2023 0557)  Promote skin healing: Assess skin/pad under line(s)/device(s)     Problem: Pain - Adult  Goal: Verbalizes/displays adequate comfort level or baseline comfort level  Outcome: Progressing     Problem: Safety - Adult  Goal: Free from fall injury  Outcome: Progressing     Problem: Discharge Planning  Goal: Discharge to home or other facility with appropriate resources  Outcome: Progressing     Problem: Chronic Conditions and Co-morbidities  Goal: Patient's chronic conditions and co-morbidity symptoms are monitored and maintained or improved  Outcome: Progressing     Problem: Dressings Lower Extremities  Goal: STG - Patient to complete lower body dressing min assist  Outcome: Progressing     Problem: Grooming  Goal: STG - Patient completes grooming SUP  Outcome: Progressing  Goal:  STG - Patient will tolerate standing 2-4 min to participate in ADLs  Outcome: Progressing

## 2023-11-28 NOTE — PROGRESS NOTES
Covid test results and MAR sent to [facility]. Will need a gold form to complete the 7000 prior to d/c.     1144- Per resident, patient will be ready to d/c today. Request sent to [facility] to complete 7000. Gold form sent and facility updated.     1413- Transport is arranged for 5pm to [facility]. Nursing, facility, and resident aware. Pt having ultrasound at bedside currently, nursing to update patient after test. Called to update patient's spouse Galindo.     Lin Tomas RN

## 2023-11-28 NOTE — PROGRESS NOTES
"Kira Solo is a 73 y.o. female on day 4 of admission presenting with Chronic cutaneous venous stasis ulcer (CMS/HCC).    Subjective   Patient was seen and examined at bedside.  Patient reports her left arm is swollen; states she has been getting poked a lot for blood draws   Patient yesterday had 2 episodes of watery BM's   Denies sob, cp, nausea, vomiting or abd pain    Objective   Constitutional:       Appearance: She is obese.   HENT:      Head: Normocephalic and atraumatic.      Nose: Nose normal.      Mouth/Throat:      Mouth: Mucous membranes are moist.   Eyes:      Extraocular Movements: Extraocular movements intact.      Conjunctiva/sclera: Conjunctivae normal.      Pupils: Pupils are equal, round, and reactive to light.   Cardiovascular:      Rate and Rhythm: Normal rate and regular rhythm.   Pulmonary:      Effort: Pulmonary effort is normal.      Breath sounds: Normal breath sounds.   Abdominal:      General: Abdomen is flat. Bowel sounds are normal.      Palpations: Abdomen is soft.   Musculoskeletal:      Cervical back: Normal range of motion and neck supple.   Skin:     Comments: BL LE: venous stasis skin changes of bilateral lower extremities with multiple open wounds/ulcers on anterior right ulcers; ACE Wraps     Left Upper arm/forearm/hand: edematous/erythematous; IV in place/ bruise on wrist   Neurological:      General: No focal deficit present.      Mental Status: She is alert and oriented to person, place, and time.      Last Recorded Vitals  Blood pressure 130/66, pulse 73, temperature 36.6 °C (97.9 °F), temperature source Temporal, resp. rate 15, height 1.473 m (4' 10\"), weight 90.7 kg (200 lb), SpO2 100 %.  Intake/Output last 3 Shifts:  I/O last 3 completed shifts:  In: 2080 (22.9 mL/kg) [P.O.:1080; IV Piggyback:1000]  Out: 600 (6.6 mL/kg) [Urine:600 (0.2 mL/kg/hr)]  Weight: 90.7 kg     Relevant Results  Results for orders placed or performed during the hospital encounter of 11/24/23 " (from the past 24 hour(s))   POCT GLUCOSE   Result Value Ref Range    POCT Glucose 208 (H) 74 - 99 mg/dL   SARS-CoV-2 RT PCR   Result Value Ref Range    Coronavirus 2019, PCR Not Detected Not Detected   POCT GLUCOSE   Result Value Ref Range    POCT Glucose 118 (H) 74 - 99 mg/dL   Urine electrolytes   Result Value Ref Range    Sodium, Urine Random 30 mmol/L    Sodium/Creatinine Ratio 22 Not established. mmol/g Creat    Potassium, Urine Random 41 mmol/L    Potassium/Creatinine Ratio 30 Not established mmol/g Creat    Chloride, Urine Random 16 mmol/L    Chloride/Creatinine Ratio 12 (L) 38 - 318 mmol/g creat    Creatinine, Urine Random 135.4 20.0 - 320.0 mg/dL   POCT GLUCOSE   Result Value Ref Range    POCT Glucose 140 (H) 74 - 99 mg/dL   POCT GLUCOSE   Result Value Ref Range    POCT Glucose 95 74 - 99 mg/dL   CBC and Auto Differential   Result Value Ref Range    WBC 10.7 4.4 - 11.3 x10*3/uL    nRBC 0.0 0.0 - 0.0 /100 WBCs    RBC 3.00 (L) 4.00 - 5.20 x10*6/uL    Hemoglobin 8.6 (L) 12.0 - 16.0 g/dL    Hematocrit 28.3 (L) 36.0 - 46.0 %    MCV 94 80 - 100 fL    MCH 28.7 26.0 - 34.0 pg    MCHC 30.4 (L) 32.0 - 36.0 g/dL    RDW 17.1 (H) 11.5 - 14.5 %    Platelets 325 150 - 450 x10*3/uL    Neutrophils % 58.9 40.0 - 80.0 %    Immature Granulocytes %, Automated 3.5 (H) 0.0 - 0.9 %    Lymphocytes % 20.3 13.0 - 44.0 %    Monocytes % 8.3 2.0 - 10.0 %    Eosinophils % 8.4 0.0 - 6.0 %    Basophils % 0.6 0.0 - 2.0 %    Neutrophils Absolute 6.32 (H) 1.60 - 5.50 x10*3/uL    Immature Granulocytes Absolute, Automated 0.37 0.00 - 0.50 x10*3/uL    Lymphocytes Absolute 2.18 0.80 - 3.00 x10*3/uL    Monocytes Absolute 0.89 (H) 0.05 - 0.80 x10*3/uL    Eosinophils Absolute 0.90 (H) 0.00 - 0.40 x10*3/uL    Basophils Absolute 0.06 0.00 - 0.10 x10*3/uL          Assessment/Plan   Principal Problem:    Chronic cutaneous venous stasis ulcer (CMS/HCC)    Kira Solo is a 73 y.o. female with a pmhx chronic kidney disease stage IIIb/IV (follows with  Dr. Porras) paroxysmal atrial fibrillation on Xarelto, HLD, gout, HTN, coronary artery disease, IDT2DM, history of mitral valve endocarditis in 2017, meningioma resection in 2014, lacunar CVA without deficits, Chronic lymphedema for which patient uses lymphedema pump presenting to the ER after being seen here 3 days ago for similar complaints generalized weakness and chronic venosus stasis/wound/ulcers who presents for weakness. DC to home from SNF about 1 week ago. Admitted for need for placement. Of note, admitted 10/23-10/27 for AIN and rash due to amoxicillin allergy.    Ambulatory dysfunction, need for placement  Swollen left arm/forearm; secondary to receiving IV vs thrombosis   Nonoliguric ALEKSEY on CKD3- likely pre-renal in setting of decreased fluid intake; down-trending   Mild dehydration  Acute leukocytosis- likely reactive, resolved  Chronic Lymphedema   Chronic RT LE Ulcers/Wounds  -Baseline 1.5-1.9; elevated at admission 2.15; 2.68 in setting of recent AIN.   Plan:  -Encourage oral intake   -At this time, wound/ulcer look chronic; non-infected; will hold off on abx; wound care RN following  -Encourage nursing to wrap lower extremity w/ Ace bandages to encourage mobilization of fluid as well as sit up in chair  -Outpatient follow up with established podiatry Dr. Cain   -Cont home meds of sodium bicarb  -Cold compresses on left arm  -Left Upper Arm Ultrasound to eval for poss thrombosis   -Continue wound care dressing: Aquacel Ag, ABD, Kerlix every other day and PRN    Chronic Diarrhea  -Cdiff PCR positive, EIA toxins negative  -No need for isolation precautions  -No need to treat w/oral vanc- if diarrhea > 5-6 liquid stools/day, leukocytosis, abd pain or worsening clinically signs will consider treatment    IDT2DM (Last A1C 7.2% 10/23)  -Acchuchecks before meals and bedtime  -Holding Semglee  -Mild SSI    Chronic conditions:  History of mitral valve endocarditis in 2017  Lacunar CVA without  deficits  HLD  Gout  HTN  -Cont home meds     IVF: as needed  Tele: not indicated  Diet: diabetic  Consults: wound care  DVT ppx: Xarelto, SCDs  Code status: DNR DNI    Dispo: Medically stable for DC, pending list of SNF choices    Kaiser Toussaint DO  Internal Medicine, PGY3  Case and plan discussed with attending     Norbert Saab DO

## 2023-11-28 NOTE — DISCHARGE INSTRUCTIONS
-Please follow up with your nephrology, given acute kidney injury; Dr. Porras   -Holding allopurinol give acute kidney injury  -Continue sodium bicarb tabs until follow up with your kidney doctor   -Continue to follow up outpatient with your podiatrist    -Continue wound care dressing: Aquacel Ag, ABD, Kerlix every other day and PRN  -Medications changes hydrocortisone cream 2.5% for rash on central chest and Aquaphor  -For pneumonia; Finish up course of Doxycycline 100 BID (11/29 - 12/3/23)   -For chronic diarrhea; added prn imodium  -Wound care instructions: Cleanse wounds with NS. Wash intact skin with soap and water. Apply skin prep to macerated skin posterior aspect RLE. Aquacel Ag, ABD and Kerlix to pretibial ulcers and PRN for soilage.

## 2023-11-28 NOTE — NURSING NOTE
Upon entering room th is am,  pt noted to have large swollen left arm and red, Drs aware, IV site removed. Arm elevated  Order for cold compress and venous duplex left arm  1300 called biometrics, aware that order is in and to be done stat today, and that pt is pending discharge after test results. Dr Saab updated that biometrics has been called.  Venous duplex negative, will be discharged this evening to [facility], report called to Jenn. Pt and her  aware.

## 2023-11-28 NOTE — NURSING NOTE
EOS:     1900- assumed care of pt at this time.    2100- PM medications administered to pt at this time.    2300- pt dressing on lower right calf changed at this time for venous ulcer.     0000- PRN tylenol administered at this time for pt reported general discomfort and pain in lower back.     0700- pt with no acute events this shift. Pt with no BM. Vitals stable and pt remained on room air. Bed alarm active at all times, call light within reach, safety maintained.

## 2023-11-28 NOTE — CARE PLAN
Has been resting, assisstedwith posistioning as needed. Rightleg dressing dry and intact  .  Report called Julia at [facility]

## 2023-11-28 NOTE — DISCHARGE SUMMARY
Discharge Diagnosis  Chronic cutaneous venous stasis ulcer (CMS/HCC)    Issues Requiring Follow-Up  Please follow up with your nephrology, given acute kidney injury; Dr. Porras   Holding allopurinol give acute kidney injury  Continue sodium bicarb tabs until follow up with your kidney doctor   Continue to follow up outpatient with your podiatrist    Continue wound care dressing: Aquacel Ag, ABD, Kerlix every other day and PRN  Medications changes hydrocortisone cream 2.5% for rash on central chest and Aquaphor  For pneumonia; Finish up course of Doxycycline 100 BID (11/29 - 12/3/23)   For chronic diarrhea; added prn imodium.      Test Results Pending At Discharge  Pending Labs       Order Current Status    Troponin Series, (0, 1 HR) In process    Blood Culture Preliminary result    Blood Culture Preliminary result            Hospital Course  A 73 y.o. female with a pmhx chronic kidney disease stage IIIb/IV (follows with Dr. Porras) paroxysmal atrial fibrillation (AC), HTN, coronary artery disease, type 2 diabetes complicated with diabetic nephropathy, history of mitral valve endocarditis in 2017, meningioma resection in 2014, CVA with lacunar infarct, septic emboli, Chronic lymphedema for which patient uses lymphedema pump presenting to the ER after being seen here 3 days ago for similar complaints generalized weakness and chronic venosus stasis/wound/ulcers. Of note, patient presented to the ED on 10/22/2023 for increasing bouts of diarrhea along with diffuse rash secondary to antibiotic allergy. During that hospital stay (10/23-10/27); ID consulted, believed rash likely 2/2 allergic reaction to her amoxicillin. Hospitalization was further complicated with AIN and diarrhrea. Patient was instructed to continue sodium bicarb 650 mg twice daily until seen by her nephrologist, she is supposed to stop her diuretics until diarrhea resolves (indication to restart diuretics). Prior to discharge, abx were discontinued and  pt went to acute rehab on 10/27/23. Patient reports she was returned home form acute rehab from [facility] on 10/17/23. Patient presents to the hospital with concern for generalized weakness and requesting placement. In the ED;  CBC: WBC 12.5; HG: 10.2, CMP: Glucose 162, BUN/CR 39/2.15 (Baseline: 1.5-1.9); BNP: 421, otherwise unremarkable. During admission, patient did have a bump in her Creatine; likely from pre-renal under the setting of decrease fluids. Given patient chronic diarrhea, C.diff was ordered. Cdiff PCR positive, EIA toxins negative. No need to treat w/oral vanc given patient had no leukocytosis, abd pain or worsening clinically signs. Also, during hospital stay patient had swelling of her arm left likely related to IV placement and receiving IV fluids in that arm. Doppler US was ordered. PT/OT eval patient and recommend Moderate intensity level of continued care. Patient was suppose to be discharge on 23 however it was discontinued given patient was tachycardic with 130's. Patient did not have any symptoms while in the ambulance. Patient was brought back. EKG was done which was similar to prior EKG's (sinus tachycardia). Labs were done; similar to morning labs with improved of kidney function wit ha CR of 2.68 to 2.54. Potassium 3.7 and phos 3.5. CBC: no WBC count,h.8 (baseline) and plt's 334. Interventions done overnight by the night team include 1 L of IV fluids given concern for dehydration.  Patient was also given IV lopressor 5mg x2 which improved pt heart to 90's. Patient patient was sinus tachycardia concern for underlying infection. Chest xray returned back revealing small pleural effusion with concerns for pneumonia. Reviewed previous ID notes; patient has a trug rash allergy to penicillins, especially amoxicillin. Allergies: also cipro/sulfa. Thus given abx allergies; will avoid cephalosporins (given the cross-reactivity between penicillin and cephalosporins). Patient was started  on doxy/azithromycin to cover for CAP on 11/29/23.  Discontinued azithromycin on 12/1/23. Patient will complete a course of doxycycline 100 BID (11/29 - 12/3/23. Blood cultures 2nd set (11/29/23): NTD. Interrogate checked for pacemaker run by patient EP doctor to make sure the report is okay, stated patient has been in and out afib this year. Echo returned back revealed rapid atrial arrhythmia which may influence the estimate of left ventricular function and transvalvular flows. EF: 55-60%. Severe mitral annular calcification.  Patient was also started on hydrocortisone cream 2.5% for rash on central chest, which stable and not worsening. Added Aquaphor for comfort given rash. On the day of discharge, patient was vitally stable, HR < 90's over the past 48 hours. Patient never had symptoms for the CAP.      Medications changes hydrocortisone cream 2.5% for rash on central chest and Aquaphor. Doxycycline 100 BID (11/29 - 12/3/23) to cover for CAP. Also, for chronic diarrhea, C diff PCR positive, EIA toxins negative, added prn imodium.      Pertinent Physical Exam At Time of Discharge  Physical Exam    Constitutional:       Appearance: She is obese.   HENT:      Head: Normocephalic and atraumatic.      Nose: Nose normal.      Mouth/Throat:      Mouth: Mucous membranes are moist.   Eyes:      Extraocular Movements: Extraocular movements intact.      Conjunctiva/sclera: Conjunctivae normal.      Pupils: Pupils are equal, round, and reactive to light.   Cardiovascular:      Rate and Rhythm: Normal rate and regular rhythm.   Pulmonary:      Effort: Pulmonary effort is normal.      Breath sounds: Normal breath sounds.   Abdominal:      General: Abdomen is flat. Bowel sounds are normal.      Palpations: Abdomen is soft.   Musculoskeletal:      Cervical back: Normal range of motion and neck supple.   Skin:     Comments: BL LE: venous stasis skin changes of bilateral lower extremities with multiple open wounds/ulcers on  anterior right ulcers; ACE Wraps     Left Upper arm/forearm/hand: edematous/erythematous; IV in place/ bruise on wrist   Neurological:      General: No focal deficit present.      Mental Status: She is alert and oriented to person, place, and time.    Home Medications     Medication List      START taking these medications     doxycycline 100 mg capsule; Commonly known as: Vibramycin; Take 1   capsule (100 mg) by mouth every 12 hours for 4 doses. Take with at least 8   ounces (large glass) of water, do not lie down for 30 minutes after   hydrocortisone 2.5 % cream; Apply topically 2 times a day.   loperamide 2 mg capsule; Commonly known as: Imodium; Take 1 capsule (2   mg) by mouth 2 times a day as needed for diarrhea for up to 20 days.   white petrolatum 41 % ointment ointment; Commonly known as: Aquaphor;   Apply 1 Application topically 2 times a day. Please apply to central chest   rash for comfort     CHANGE how you take these medications     amLODIPine 5 mg tablet; Commonly known as: Norvasc   latanoprost 0.005 % ophthalmic solution; Commonly known as: Xalatan;   Administer 1 drop into both eyes once daily.; What changed: when to take   this   sodium bicarbonate 650 mg tablet; Take 1 tablet (650 mg) by mouth 3   times a day.; What changed: when to take this     CONTINUE taking these medications     allopurinol 100 mg tablet; Commonly known as: Zyloprim   ascorbic acid 500 mg tablet; Commonly known as: Vitamin C   aspirin 81 mg EC tablet   BIOTIN FORTE ORAL   brimonidine 0.2 % ophthalmic solution; Commonly known as: AlphaGAN   cholecalciferol 25 MCG (1000 UT) tablet; Commonly known as: Vitamin D-3   coenzyme Q-10 10 mg capsule   ezetimibe 10 mg tablet; Commonly known as: Zetia   ferrous sulfate (325 mg ferrous sulfate) tablet   fish oil concentrate 120-180 mg capsule; Commonly known as: Omega-3   ketoconazole 2 % shampoo; Commonly known as: NIZOral; Apply topically 3   times a week.   losartan 25 mg tablet;  Commonly known as: Cozaar   metoprolol succinate XL 25 mg 24 hr tablet; Commonly known as: Toprol-XL   multivitamin with minerals tablet   nystatin 100,000 unit/gram powder; Commonly known as: Mycostatin; Apply   1 Application topically if needed for itching.   red yeast rice 600 mg tablet   Semglee(insulin glarg-yfgn)Pen 100 unit/mL (3 mL) Pen; Generic drug:   insulin glargine-yfgn   Xarelto 15 mg tablet; Generic drug: rivaroxaban     STOP taking these medications     cetirizine 10 mg tablet; Commonly known as: ZyrTEC   famotidine 40 mg tablet; Commonly known as: Pepcid       Outpatient Follow-Up  Future Appointments   Date Time Provider Department Center   12/4/2023  4:30 PM Stephania Warner MD [id number] [place]   12/13/2023 11:20 AM Trip Cruz MD [id number] [place]   12/15/2023  2:00 PM Karis Wheat MD [id number] [place]   12/15/2023  3:40 PM Luna Porras MD [id number] [place]   1/8/2024 11:15 AM Tanja Pascual MD [id number] [place]   1/16/2024 11:15 AM Roverto Cain DPM [id number] [place]   2/15/2024 11:30 AM Stephania Warner MD [id number] [place]   7/30/2024  1:00 PM [facility] [id number] [place]   7/30/2024  1:20 PM Artemio Sierra MD [id number] [place]       Norbert Saab DO

## 2023-11-29 NOTE — CARE PLAN
The patient's goals for the shift include      The clinical goals for the shift include see plan of care

## 2023-11-29 NOTE — CARE PLAN
The patient's goals for the shift include      The clinical goals for the shift include see c are plan      Problem: Pain  Goal: My pain/discomfort is manageable  11/29/2023 0627 by Jennifer Boggs RN  Outcome: Progressing  11/29/2023 0627 by Jennifer Boggs RN  Outcome: Progressing     Problem: Safety  Goal: Patient will be injury free during hospitalization  11/29/2023 0627 by Jennifer Boggs RN  Outcome: Progressing  11/29/2023 0627 by Jennifer Boggs RN  Outcome: Progressing  Goal: I will remain free of falls  11/29/2023 0627 by Jennifer Boggs RN  Outcome: Progressing  11/29/2023 0627 by Jennifer Boggs RN  Outcome: Progressing     Problem: Daily Care  Goal: Daily care needs are met  11/29/2023 0627 by Jennifer Boggs RN  Outcome: Progressing  11/29/2023 0627 by Jennifer Boggs RN  Outcome: Progressing     Problem: Skin  Goal: Decreased wound size/increased tissue granulation at next dressing change  11/29/2023 0627 by Jennifer Boggs RN  Outcome: Progressing  Flowsheets (Taken 11/29/2023 0627)  Decreased wound size/increased tissue granulation at next dressing change: Promote sleep for wound healing  11/29/2023 0627 by Jennifer Boggs RN  Outcome: Progressing  Flowsheets (Taken 11/29/2023 0627)  Decreased wound size/increased tissue granulation at next dressing change: Promote sleep for wound healing  Goal: Participates in plan/prevention/treatment measures  11/29/2023 0627 by Jennifer Boggs RN  Outcome: Progressing  Flowsheets (Taken 11/29/2023 0627)  Participates in plan/prevention/treatment measures: Elevate heels  11/29/2023 0627 by Jennifer Boggs RN  Outcome: Progressing  Flowsheets (Taken 11/29/2023 0627)  Participates in plan/prevention/treatment measures: Elevate heels  Goal: Prevent/manage excess moisture  11/29/2023 0627 by Jennifer Boggs RN  Outcome: Progressing  Flowsheets (Taken 11/29/2023 0627)  Prevent/manage excess moisture: Moisturize dry skin  11/29/2023 0627 by Jennifer Boggs RN  Outcome:  Progressing  Flowsheets (Taken 11/29/2023 0627)  Prevent/manage excess moisture: Moisturize dry skin  Goal: Prevent/minimize sheer/friction injuries  11/29/2023 0627 by Jennifer Boggs RN  Outcome: Progressing  Flowsheets (Taken 11/29/2023 0627)  Prevent/minimize sheer/friction injuries: Use pull sheet  11/29/2023 0627 by Jennifer Boggs RN  Outcome: Progressing  Flowsheets (Taken 11/29/2023 0627)  Prevent/minimize sheer/friction injuries: Use pull sheet  Goal: Promote/optimize nutrition  11/29/2023 0627 by Jennifer Boggs RN  Outcome: Progressing  Flowsheets (Taken 11/29/2023 0627)  Promote/optimize nutrition: Monitor/record intake including meals  11/29/2023 0627 by Jennifer Boggs RN  Outcome: Progressing  Flowsheets (Taken 11/29/2023 0627)  Promote/optimize nutrition: Monitor/record intake including meals  Goal: Promote skin healing  11/29/2023 0627 by Jennifer Boggs RN  Outcome: Progressing  Flowsheets (Taken 11/29/2023 0627)  Promote skin healing: Assess skin/pad under line(s)/device(s)  11/29/2023 0627 by Jennifer Boggs RN  Outcome: Progressing  Flowsheets (Taken 11/29/2023 0627)  Promote skin healing: Assess skin/pad under line(s)/device(s)     Problem: Recent hospitalization or complication while living with DM  Goal: Patient will effectively self-manage their DM disease process  11/29/2023 0627 by Jennifer Boggs RN  Outcome: Progressing  11/29/2023 0627 by Jennifer Boggs RN  Outcome: Progressing     Problem: Address barriers to lifestyle change  Goal: Find workable solutions to meet health goals  11/29/2023 0627 by Jennifer Boggs RN  Outcome: Progressing  11/29/2023 0627 by Jennifer Boggs RN  Outcome: Progressing     Problem: Diabetes  Goal: Achieve decreasing blood glucose levels by end of shift  11/29/2023 0627 by Jennifer Boggs RN  Outcome: Progressing  11/29/2023 0627 by Jennifer Boggs RN  Outcome: Progressing  Goal: Increase stability of blood glucose readings by end of shift  11/29/2023 0627 by Jennifer WALKER  Ambrose RN  Outcome: Progressing  11/29/2023 0627 by Jennifer Boggs, RN  Outcome: Progressing     Problem: Fall/Injury  Goal: Not fall by end of shift  11/29/2023 0627 by Jennifer Boggs, RN  Outcome: Progressing  11/29/2023 0627 by Jennifer Boggs, RN  Outcome: Progressing  Goal: Be free from injury by end of the shift  11/29/2023 0627 by Jennifer Boggs, RN  Outcome: Progressing  11/29/2023 0627 by Jennifer Boggs, RN  Outcome: Progressing  Goal: Verbalize understanding of personal risk factors for fall in the hospital  11/29/2023 0627 by Jennifer Boggs, RN  Outcome: Progressing  11/29/2023 0627 by Jennifer Boggs, RN  Outcome: Progressing  Goal: Verbalize understanding of risk factor reduction measures to prevent injury from fall in the home  11/29/2023 0627 by Jennifer Boggs, RN  Outcome: Progressing  11/29/2023 0627 by Jennifer Boggs, RN  Outcome: Progressing  Goal: Use assistive devices by end of the shift  11/29/2023 0627 by Jennifer Boggs, RN  Outcome: Progressing  11/29/2023 0627 by Jennifer Boggs, RN  Outcome: Progressing  Goal: Pace activities to prevent fatigue by end of the shift  11/29/2023 0627 by Jennifer Boggs, RN  Outcome: Progressing  11/29/2023 0627 by Jennifer Boggs, RN  Outcome: Progressing

## 2023-11-29 NOTE — SIGNIFICANT EVENT
Overnight, patient was found to have elevated HR in 130s on monitor.  Patient was examined and assessed at bedside.  She was asymptomatic, denying any chest pain or shortness of breath.  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  Patient has history of A-fib s/p DDDR pacemaker, and follows with EP.  She has yearly pacemaker checks and HR is set between  bpm with A-fib burden over 40%.  She reportedly goes into A-fib for months before spontaneously converting back to sinus.  She was given 1 L IV fluid due to concerns of dehydration from reported diarrhea, however her heart rate was still uncontrolled.  Patient currently takes Toprol XL daily for rate control. She was given IV Lopressor 5 mg x2 doses, and had better controlled rates in the 90s.     Torsten Argueta DO  PGY-2 Internal Medicine

## 2023-11-29 NOTE — NURSING NOTE
Transport picked up pt for transport to [facility]    Pt Hr elevated in the 130's    Pt discharge cancelled    Pt brought back to room 3026, pt stable at this time

## 2023-11-29 NOTE — PROGRESS NOTES
"Kira Solo is a 73 y.o. female on day 5 of admission presenting with Chronic cutaneous venous stasis ulcer (CMS/HCC).    Subjective   Patient was seen and examined at bedside.  Patient denies any sob, cp, headaches, vomiting, nausea, abd pain, lightheadedness or headaches     Objective   Constitutional:       Appearance: She is obese.   HENT:      Head: Normocephalic and atraumatic.      Nose: Nose normal.      Mouth/Throat:      Mouth: Mucous membranes are moist.   Eyes:      Extraocular Movements: Extraocular movements intact.      Conjunctiva/sclera: Conjunctivae normal.      Pupils: Pupils are equal, round, and reactive to light.   Cardiovascular:      Rate and Rhythm: Normal rate and regular rhythm.   Pulmonary:      Effort: Pulmonary effort is normal.      Breath sounds: Normal breath sounds.   Abdominal:      General: Abdomen is flat. Bowel sounds are normal.      Palpations: Abdomen is soft.   Musculoskeletal:      Cervical back: Normal range of motion and neck supple.   Skin:     Comments: BL LE: venous stasis skin changes of bilateral lower extremities with multiple open wounds/ulcers on anterior right ulcers; ACE Wraps     Left Upper arm/forearm/hand: edematous/erythematous; IV in place/ bruise on wrist   Neurological:      General: No focal deficit present.      Mental Status: She is alert and oriented to person, place, and time.      Last Recorded Vitals  Blood pressure 100/78, pulse (!) 126, temperature 37.6 °C (99.7 °F), temperature source Temporal, resp. rate 18, height 1.473 m (4' 10\"), weight 90.7 kg (200 lb), SpO2 94 %.  Intake/Output last 3 Shifts:  I/O last 3 completed shifts:  In: 1240 (13.7 mL/kg) [P.O.:240; IV Piggyback:1000]  Out: 300 (3.3 mL/kg) [Urine:300 (0.1 mL/kg/hr)]  Weight: 90.7 kg     Relevant Results  Results for orders placed or performed during the hospital encounter of 11/24/23 (from the past 24 hour(s))   POCT GLUCOSE   Result Value Ref Range    POCT Glucose 142 (H) 74 - " 99 mg/dL   POCT GLUCOSE   Result Value Ref Range    POCT Glucose 127 (H) 74 - 99 mg/dL   POCT GLUCOSE   Result Value Ref Range    POCT Glucose 104 (H) 74 - 99 mg/dL   CBC   Result Value Ref Range    WBC 11.7 (H) 4.4 - 11.3 x10*3/uL    nRBC 0.0 0.0 - 0.0 /100 WBCs    RBC 3.16 (L) 4.00 - 5.20 x10*6/uL    Hemoglobin 9.0 (L) 12.0 - 16.0 g/dL    Hematocrit 29.7 (L) 36.0 - 46.0 %    MCV 94 80 - 100 fL    MCH 28.5 26.0 - 34.0 pg    MCHC 30.3 (L) 32.0 - 36.0 g/dL    RDW 17.0 (H) 11.5 - 14.5 %    Platelets 334 150 - 450 x10*3/uL   Renal Function Panel   Result Value Ref Range    Glucose 103 (H) 74 - 99 mg/dL    Sodium 142 136 - 145 mmol/L    Potassium 4.0 3.5 - 5.3 mmol/L    Chloride 107 98 - 107 mmol/L    Bicarbonate 25 21 - 32 mmol/L    Anion Gap 14 10 - 20 mmol/L    Urea Nitrogen 58 (H) 6 - 23 mg/dL    Creatinine 2.54 (H) 0.50 - 1.05 mg/dL    eGFR 19 (L) >60 mL/min/1.73m*2    Calcium 8.2 (L) 8.6 - 10.3 mg/dL    Phosphorus 3.5 2.5 - 4.9 mg/dL    Albumin 2.6 (L) 3.4 - 5.0 g/dL   Renal function panel   Result Value Ref Range    Glucose 94 74 - 99 mg/dL    Sodium 141 136 - 145 mmol/L    Potassium 3.7 3.5 - 5.3 mmol/L    Chloride 107 98 - 107 mmol/L    Bicarbonate 25 21 - 32 mmol/L    Anion Gap 13 10 - 20 mmol/L    Urea Nitrogen 55 (H) 6 - 23 mg/dL    Creatinine 2.38 (H) 0.50 - 1.05 mg/dL    eGFR 21 (L) >60 mL/min/1.73m*2    Calcium 8.0 (L) 8.6 - 10.3 mg/dL    Phosphorus 3.5 2.5 - 4.9 mg/dL    Albumin 2.5 (L) 3.4 - 5.0 g/dL   Magnesium   Result Value Ref Range    Magnesium 1.72 1.60 - 2.40 mg/dL   CBC and Auto Differential   Result Value Ref Range    WBC 9.0 4.4 - 11.3 x10*3/uL    nRBC 0.0 0.0 - 0.0 /100 WBCs    RBC 3.07 (L) 4.00 - 5.20 x10*6/uL    Hemoglobin 8.8 (L) 12.0 - 16.0 g/dL    Hematocrit 28.9 (L) 36.0 - 46.0 %    MCV 94 80 - 100 fL    MCH 28.7 26.0 - 34.0 pg    MCHC 30.4 (L) 32.0 - 36.0 g/dL    RDW 17.2 (H) 11.5 - 14.5 %    Platelets 350 150 - 450 x10*3/uL    Neutrophils % 51.4 40.0 - 80.0 %    Immature Granulocytes  %, Automated 2.1 (H) 0.0 - 0.9 %    Lymphocytes % 27.4 13.0 - 44.0 %    Monocytes % 10.5 2.0 - 10.0 %    Eosinophils % 7.8 0.0 - 6.0 %    Basophils % 0.8 0.0 - 2.0 %    Neutrophils Absolute 4.61 1.60 - 5.50 x10*3/uL    Immature Granulocytes Absolute, Automated 0.19 0.00 - 0.50 x10*3/uL    Lymphocytes Absolute 2.46 0.80 - 3.00 x10*3/uL    Monocytes Absolute 0.94 (H) 0.05 - 0.80 x10*3/uL    Eosinophils Absolute 0.70 (H) 0.00 - 0.40 x10*3/uL    Basophils Absolute 0.07 0.00 - 0.10 x10*3/uL   B-type natriuretic peptide   Result Value Ref Range    BNP 1,210 (H) 0 - 99 pg/mL   POCT GLUCOSE   Result Value Ref Range    POCT Glucose 102 (H) 74 - 99 mg/dL          Assessment/Plan   Principal Problem:    Chronic cutaneous venous stasis ulcer (CMS/HCC)    Kira Solo is a 73 y.o. female with a pmhx chronic kidney disease stage IIIb/IV (follows with Dr. Porras) paroxysmal atrial fibrillation on Xarelto, HLD, gout, HTN, coronary artery disease, IDT2DM, history of mitral valve endocarditis in 2017, meningioma resection in 2014, lacunar CVA without deficits, Chronic lymphedema for which patient uses lymphedema pump presenting to the ER after being seen here 3 days ago for similar complaints generalized weakness and chronic venosus stasis/wound/ulcers who presents for weakness. DC to home from SNF about 1 week ago. Admitted for need for placement. Of note, admitted 10/23-10/27 for AIN and rash due to amoxicillin allergy.    Ambulatory dysfunction, need for placement  Swollen left arm/forearm; secondary to receiving IV vs thrombosis   Nonoliguric ALEKSEY on CKD3- likely pre-renal in setting of decreased fluid intake; down-trending   Mild dehydration  Acute leukocytosis- likely reactive, resolved  Chronic Lymphedema   Chronic RT LE Ulcers/Wounds  -Baseline 1.5-1.9; elevated at admission 2.15; 2.68 in setting of recent AIN.   Plan:  -Encourage oral intake   -At this time, wound/ulcer look chronic; non-infected; will hold off on abx;  wound care RN following  -Encourage nursing to wrap lower extremity w/ Ace bandages to encourage mobilization of fluid as well as sit up in chair  -Outpatient follow up with established podiatry Dr. Cain   -Cont home meds of sodium bicarb  -Cold compresses on left arm  -Left Upper Arm Ultrasound to eval for poss thrombosis   -Continue wound care dressing: Aquacel Ag, ABD, Kerlix every other day and PRN    Chronic Diarrhea  -Cdiff PCR positive, EIA toxins negative  -No need for isolation precautions  -No need to treat w/oral vanc- if diarrhea > 5-6 liquid stools/day, leukocytosis, abd pain or worsening clinically signs will consider treatment    IDT2DM (Last A1C 7.2% 10/23)  -Acchuchecks before meals and bedtime  -Holding Semglee  -Mild SSI    Chronic conditions:  History of mitral valve endocarditis in 2017  Lacunar CVA without deficits  HLD  Gout  HTN  -Cont home meds     IVF: as needed  Tele: not indicated  Diet: diabetic  Consults: wound care  DVT ppx: Xarelto, EDGARDs  Code status: DNR DNI    Dispo: Medically stable for DC, pending list of SNF choices    Kaiser Toussaint DO  Internal Medicine, PGY3  Case and plan discussed with attending     Norbert Saab DO

## 2023-11-29 NOTE — PROGRESS NOTES
Was notified by nursing that patient was picked up by transport company yesterday evening to d/c to [facility], but was brought back by transport due to tachycardia. Awaiting confirmation from resident to find out if patient is still medically ready to d/c today as d/c order is still in.     0931- Per resident, patient not ready to d/c today. Updated [facility] SNF.     Lin Tomas RN

## 2023-11-29 NOTE — NURSING NOTE
Patient restarted on antibiotics, po vibramycin and iv azithromycin. Patient given iv lasix. Tolerating well , will conitnue to monitor

## 2023-11-29 NOTE — NURSING NOTE
EOS:     2115- pt brought back to room 3026 at this time, shift assessment completed at this time.

## 2023-11-29 NOTE — PROGRESS NOTES
Kira Solo is a 73 y.o. female on day 5 of admission presenting with Chronic cutaneous venous stasis ulcer (CMS/HCC).    Subjective   Overnight, patient was suppose to be discharged, however while being discharged patient vitals revealed HR: 130's. Patient did not have any symptoms while in the ambulance. Patient was brought back. EKG was done which was similar to prior EKG's (sinus tachycardia). Labs were done; similar to morning labs from yesterday, improved of kidney function wit ha CR of 2.68 to 2.54. Potassium 3.7 and phos 3.5. CBC: no WBC count,h.8 (baseline) and plt's 334. Interventions done overnight by the night team include 1 L of IV fluids given concern for dehydration.  Patient was also given IV lopressor 5mg x2 which improved pt heart to 90's.     This morning, patient was seen and examined at bedside.  Patient denies any symptoms such as swelling in her legs, chest pain, sob, lightheadedness, or headaches    Objective   Constitutional:       Appearance: She is obese   HENT:      Head: Normocephalic and atraumatic.      Nose: Nose normal.      Mouth/Throat:      Mouth: Mucous membranes are moist.   Eyes:      Extraocular Movements: Extraocular movements intact.      Conjunctiva/sclera: Conjunctivae normal.      Pupils: Pupils are equal, round, and reactive to light.   Cardiovascular:      Rate and Rhythm: tachycardic  Pulmonary:      Effort: Pulmonary effort is normal.      Breath sounds: Normal breath sounds.   Abdominal:      General: Abdomen is flat. Bowel sounds are normal.      Palpations: Abdomen is soft.   Musculoskeletal:      Cervical back: Normal range of motion and neck supple.   Skin:     Comments: BL LE: venous stasis skin changes of bilateral lower extremities with multiple open wounds/ulcers on anterior right ulcers; ACE Wraps     Left Upper arm/forearm/hand: edematous/erythematous; improved from yesterday   Redness on central chest (slight worse prior P/E)  Neurological:       "General: No focal deficit present.      Mental Status: She is alert and oriented to person, place, and time.      Last Recorded Vitals  Blood pressure 100/78, pulse (!) 126, temperature 37.6 °C (99.7 °F), temperature source Temporal, resp. rate 18, height 1.473 m (4' 10\"), weight 90.7 kg (200 lb), SpO2 94 %.  Intake/Output last 3 Shifts:  I/O last 3 completed shifts:  In: 1240 (13.7 mL/kg) [P.O.:240; IV Piggyback:1000]  Out: 300 (3.3 mL/kg) [Urine:300 (0.1 mL/kg/hr)]  Weight: 90.7 kg     Relevant Results  Results for orders placed or performed during the hospital encounter of 11/24/23 (from the past 24 hour(s))   POCT GLUCOSE   Result Value Ref Range    POCT Glucose 142 (H) 74 - 99 mg/dL   POCT GLUCOSE   Result Value Ref Range    POCT Glucose 127 (H) 74 - 99 mg/dL   POCT GLUCOSE   Result Value Ref Range    POCT Glucose 104 (H) 74 - 99 mg/dL   CBC   Result Value Ref Range    WBC 11.7 (H) 4.4 - 11.3 x10*3/uL    nRBC 0.0 0.0 - 0.0 /100 WBCs    RBC 3.16 (L) 4.00 - 5.20 x10*6/uL    Hemoglobin 9.0 (L) 12.0 - 16.0 g/dL    Hematocrit 29.7 (L) 36.0 - 46.0 %    MCV 94 80 - 100 fL    MCH 28.5 26.0 - 34.0 pg    MCHC 30.3 (L) 32.0 - 36.0 g/dL    RDW 17.0 (H) 11.5 - 14.5 %    Platelets 334 150 - 450 x10*3/uL   Renal Function Panel   Result Value Ref Range    Glucose 103 (H) 74 - 99 mg/dL    Sodium 142 136 - 145 mmol/L    Potassium 4.0 3.5 - 5.3 mmol/L    Chloride 107 98 - 107 mmol/L    Bicarbonate 25 21 - 32 mmol/L    Anion Gap 14 10 - 20 mmol/L    Urea Nitrogen 58 (H) 6 - 23 mg/dL    Creatinine 2.54 (H) 0.50 - 1.05 mg/dL    eGFR 19 (L) >60 mL/min/1.73m*2    Calcium 8.2 (L) 8.6 - 10.3 mg/dL    Phosphorus 3.5 2.5 - 4.9 mg/dL    Albumin 2.6 (L) 3.4 - 5.0 g/dL   Renal function panel   Result Value Ref Range    Glucose 94 74 - 99 mg/dL    Sodium 141 136 - 145 mmol/L    Potassium 3.7 3.5 - 5.3 mmol/L    Chloride 107 98 - 107 mmol/L    Bicarbonate 25 21 - 32 mmol/L    Anion Gap 13 10 - 20 mmol/L    Urea Nitrogen 55 (H) 6 - 23 mg/dL    " Creatinine 2.38 (H) 0.50 - 1.05 mg/dL    eGFR 21 (L) >60 mL/min/1.73m*2    Calcium 8.0 (L) 8.6 - 10.3 mg/dL    Phosphorus 3.5 2.5 - 4.9 mg/dL    Albumin 2.5 (L) 3.4 - 5.0 g/dL   Magnesium   Result Value Ref Range    Magnesium 1.72 1.60 - 2.40 mg/dL   Troponin I, High Sensitivity   Result Value Ref Range    Troponin I, High Sensitivity 41 (H) 0 - 13 ng/L   CBC and Auto Differential   Result Value Ref Range    WBC 9.0 4.4 - 11.3 x10*3/uL    nRBC 0.0 0.0 - 0.0 /100 WBCs    RBC 3.07 (L) 4.00 - 5.20 x10*6/uL    Hemoglobin 8.8 (L) 12.0 - 16.0 g/dL    Hematocrit 28.9 (L) 36.0 - 46.0 %    MCV 94 80 - 100 fL    MCH 28.7 26.0 - 34.0 pg    MCHC 30.4 (L) 32.0 - 36.0 g/dL    RDW 17.2 (H) 11.5 - 14.5 %    Platelets 350 150 - 450 x10*3/uL    Neutrophils % 51.4 40.0 - 80.0 %    Immature Granulocytes %, Automated 2.1 (H) 0.0 - 0.9 %    Lymphocytes % 27.4 13.0 - 44.0 %    Monocytes % 10.5 2.0 - 10.0 %    Eosinophils % 7.8 0.0 - 6.0 %    Basophils % 0.8 0.0 - 2.0 %    Neutrophils Absolute 4.61 1.60 - 5.50 x10*3/uL    Immature Granulocytes Absolute, Automated 0.19 0.00 - 0.50 x10*3/uL    Lymphocytes Absolute 2.46 0.80 - 3.00 x10*3/uL    Monocytes Absolute 0.94 (H) 0.05 - 0.80 x10*3/uL    Eosinophils Absolute 0.70 (H) 0.00 - 0.40 x10*3/uL    Basophils Absolute 0.07 0.00 - 0.10 x10*3/uL   B-type natriuretic peptide   Result Value Ref Range    BNP 1,210 (H) 0 - 99 pg/mL   POCT GLUCOSE   Result Value Ref Range    POCT Glucose 102 (H) 74 - 99 mg/dL          Assessment/Plan   Principal Problem:    Chronic cutaneous venous stasis ulcer (CMS/HCC)    Kira Solo is a 73 y.o. female with a pmhx chronic kidney disease stage IIIb/IV (follows with Dr. Porras) paroxysmal atrial fibrillation on Xarelto, HLD, gout, HTN, coronary artery disease, IDT2DM, history of mitral valve endocarditis in 2017, meningioma resection in 2014, lacunar CVA without deficits, Chronic lymphedema for which patient uses lymphedema pump presenting to the ER after being  seen here 3 days ago for similar complaints generalized weakness and chronic venosus stasis/wound/ulcers who presents for weakness. DC to home from SNF about 1 week ago. Admitted for need for placement. Of note, admitted 10/23-10/27 for AIN and rash due to amoxicillin allergy.    Sinus Tachycardia; suspect; fluid overloaded vs underlying infection   Acute leukocytosis, resolved this morning   Erythematous rash; resembles contact dermatitis   Paroxysmal atrial fibrillation (Xarelto)  Pacemaker   CAD  -Overnight, patient was suppose to be discharged, however while being discharged patient vitals revealed HR: 130's. Patient did not have any symptoms while in the ambulance. Patient was brought back. EKG was done which was similar to prior EKG's (sinus tachycardia). Interventions done overnight by the night team include 1 L of IV fluids given concern for dehydration.  Patient was also given IV lopressor 5mg x2 which improved pt heart to 90's.   Reviewed Tele: Sinus tachycardia   BNP: 1,210 and Troponin 41  Echo Oct 2023: EF: 60%, estrictive pattern of left ventricular diastolic filling, Moderate mitral valve regurgitation  Per chart review: Ms. Solo had syncope and hospitalized for sinoatrial dysfunction at [facility] with PPM 5/18/2022 implantation by Dr. Suggs.      Plan:  -Ordered EKG this morning  -Continue Tele monitoring   -Ordered IV Lasix 40 mg for fluid overload  -Ordered Second troponin, Blood cultures, Chest xray, V/Q scan, Echo and interrogate check for pacemaker; to assess etiology of tachycardia   -Started on hydrocortisone cream 2.5% for rash    Ambulatory dysfunction, need for placement  Swollen left arm/forearm; secondary to receiving IV, improving   Nonoliguric ALEKSEY on CKD3- likely pre-renal in setting of decreased fluid intake; down-trending   Mild dehydration  Chronic Lymphedema   Chronic RT LE Ulcers/Wounds  -Baseline 1.5-1.9; elevated at admission 2.15; 2.38, in setting of recent AIN.   Left  Upper Arm Ultrasound negative for thrombosis   Plan:  -Encourage oral intake   -At this time, wound/ulcer look chronic; non-infected; will hold off on abx; wound care RN following  -Encourage nursing to wrap lower extremity w/ Ace bandages to encourage mobilization of fluid as well as sit up in chair  -Outpatient follow up with established podiatry Dr. Cain   -Cont home meds of sodium bicarb  -Continue wound care dressing: Aquacel Ag, ABD, Kerlix every other day and PRN    Chronic Diarrhea  -Cdiff PCR positive, EIA toxins negative  -No need for isolation precautions  -No need to treat w/oral vanc- if diarrhea > 5-6 liquid stools/day, leukocytosis, abd pain or worsening clinically signs will consider treatment    IDT2DM (Last A1C 7.2% 10/23)  -Acchuchecks before meals and bedtime  -Holding Semglee  -Mild SSI    Chronic conditions:  History of mitral valve endocarditis in 2017  Lacunar CVA without deficits  HLD  Gout  HTN  -Cont home meds     IVF: as needed  Tele: on tele  Diet: diabetic  Consults: wound care  DVT ppx: Xarelto, SCDs  Code status: DNR DNI    Dispo: Patient being worked up for tachycardia; pending     Kaiser Toussaint DO  Internal Medicine, PGY3  Case and plan discussed with attending Second troponin, Chest xray, V/Q scan, Echo and interrogate check for pacemaker; to assess etiology of tachycardia     Update: chest xray returned back revealing small pleural effusion with concerns for pneumonia. Reviewed previous ID notes; patient has a trug rash allergy to penicillins, especially amoxicillin. Allergies: also cipro/sulfa. Thus given abx allergies; will avoid cephalosporins (given the cross-reactivity between penicillin and cephalosporins). Will start patient  doxy/azithromycin to cover for CAP. Discussed with the nurse, to monitor for all allergic or side effects and to inform the team if that were to happen. Ordered Pro-Calc and Urine antigens streph pneumonia/legionella    Norbert Saab DO

## 2023-11-30 NOTE — NURSING NOTE
End of shift note. No acute changes this shift. Patient remains in cdiff precautions. Patient telemetry 120's-130's most this shift. Now heart rate 69. Patient echo and EKG completed. Plan for patient to be discharged to [facility] on discharge.

## 2023-11-30 NOTE — PROGRESS NOTES
Physical Therapy    Physical Therapy Treatment    Patient Name: Kira Solo  MRN: [id number]  Today's Date: 11/30/2023  Time Calculation  Start Time: 1115  Stop Time: 1140  Time Calculation (min): 25 min       Assessment/Plan   PT Assessment  PT Assessment Results: Decreased strength, Decreased endurance, Impaired balance, Decreased mobility  Rehab Prognosis: Good  End of Session Communication: Bedside nurse, PCT/NA/CTA  End of Session Patient Position: Up in chair, Alarm on  PT Plan  Inpatient/Swing Bed or Outpatient: Inpatient  PT Plan  Treatment/Interventions: Bed mobility, Transfer training  PT Plan: Skilled PT  PT Frequency: 3 times per week  PT Discharge Recommendations: Moderate intensity level of continued care  PT Recommended Transfer Status: Assist x2, Assistive device       11/30/23 1115   PT  Visit   PT Received On 11/30/23   Response to Previous Treatment Patient with no complaints from previous session.   General   Prior to Session Communication Bedside nurse   Patient Position Received Bed, 3 rail up;Alarm on   Preferred Learning Style verbal;visual   General Comment encouragement to participate   Precautions   Medical Precautions Fall precautions   Pain Assessment   Pain Assessment 0-10   Pain Score 0 - No pain   Cognition   Overall Cognitive Status WFL   Orientation Level Oriented X4   Therapeutic Exercise   Therapeutic Exercise Performed Yes  (reviewed seated therex with legs up)   Bed Mobility   Bed Mobility Yes   Bed Mobility 1   Bed Mobility 1 Supine to sitting   Level of Assistance 1 Moderate assistance;Moderate verbal cues   Transfers   Transfer Yes   Transfer 1   Transfer From 1 Bed to   Transfer to 1 Stand   Technique 1 Sit to stand   Transfer Device 1 Walker;Gait belt   Transfer Level of Assistance 1 Moderate assistance   Transfers 2   Transfer From 2 Bed to   Transfer to 2 Chair with arms   Technique 2 Stand pivot   Transfer Device 2 Walker   Transfer Level of Assistance 2 Minimum  assistance   Activity Tolerance   Endurance Tolerates 10 - 20 min exercise with multiple rests   PT Assessment   PT Assessment Results Decreased strength;Decreased endurance;Impaired balance;Decreased mobility   Rehab Prognosis Good   End of Session Communication Bedside nurse;PCT/NA/CTA   End of Session Patient Position Up in chair;Alarm on   Outpatient Education   Individual(s) Educated Patient   Education Provided Fall Risk   PT Plan   Inpatient/Swing Bed or Outpatient Inpatient   PT Plan   Treatment/Interventions Bed mobility;Transfer training   PT Plan Skilled PT   PT Frequency 3 times per week   PT Discharge Recommendations Moderate intensity level of continued care   PT Recommended Transfer Status Assist x2;Assistive device     Outcome Measures:  [facility] Basic Mobility  Turning from your back to your side while in a flat bed without using bedrails: A lot  Moving from lying on your back to sitting on the side of a flat bed without using bedrails: A lot  Moving to and from bed to chair (including a wheelchair): A lot  Standing up from a chair using your arms (e.g. wheelchair or bedside chair): A little  To walk in hospital room: A little  Climbing 3-5 steps with railing: Total  Basic Mobility - Total Score: 13  Education Documentation  No documentation found.  Education Comments  No comments found.            EDUCATION:  Outpatient Education  Individual(s) Educated: Patient  Education Provided: Fall Risk    GOALS:  Encounter Problems       Encounter Problems (Resolved)       PT Problem       PT Goal 1 (Adequate for Discharge)       Start:  11/27/23    Expected End:  12/11/23    Resolved:  11/28/23    Pt able to perform bed mobility with mod assist.           PT Goal 2 (Adequate for Discharge)       Start:  11/27/23    Expected End:  12/11/23    Resolved:  11/28/23    Pt able to complete all transfers with mod assist.            PT Goal 3 (Adequate for Discharge)       Start:  11/27/23    Expected End:  12/11/23     Resolved:  11/28/23    Pt able to ambulate 10 feet with front wheeled walker and mod assist of 2.              Pain - Adult          Safety       STG - Patient uses call light consistently to request assistance with transfers (Adequate for Discharge)       Start:  11/26/23    Resolved:  11/28/23            Transfers       STG - Patient will perform bed mobility min assist (Adequate for Discharge)       Start:  11/27/23    Expected End:  12/11/23    Resolved:  11/28/23         STG - Patient will perform toilet transfer MOD assist (Adequate for Discharge)       Start:  11/27/23    Expected End:  12/11/23    Resolved:  11/28/23

## 2023-11-30 NOTE — NURSING NOTE
1200 dressing changed to rle  1248 EKG reported to Dr Saab   1300 pacemaker interrogation completed at bedside  1330 pacemaker  interrogation report reported to Dr. Saab    Eos-Patient stable this shift. Patient continues on abx for pna. Hydrocortisone cream applied to upper extremities and chest for redness. Patient denies any pain or discomfort. Patient had x1 bm this shift.

## 2023-11-30 NOTE — CARE PLAN
No acute changes was noted this shift. Pt rested overnight. Pt remains NSR on tele this shift. Pt precautions remains in place. Pt daughter was at bedside this shift and was updated. Pt safety been maintained.

## 2023-11-30 NOTE — PROGRESS NOTES
"Kira Solo is a 73 y.o. female on day 6 of admission presenting with Chronic cutaneous venous stasis ulcer (CMS/HCC).    Subjective   Overnight, patient was had low-grade fever of 100.4 @8pm and Heart rate improved, patient heart-rate stayed between 70-80's from 4pm on-wards     This morning, patient was seen and examined at bedside.  Patient states during the mid of the night felt \"mild itchy\"   Patient denies any symptoms such as swelling in her legs, chest pain, sob, lightheadedness, or headaches.    Objective   Constitutional:       Appearance: She is obese   HENT:      Head: Normocephalic and atraumatic.      Nose: Nose normal.      Mouth/Throat:      Mouth: Mucous membranes are moist.   Eyes:      Extraocular Movements: Extraocular movements intact.      Conjunctiva/sclera: Conjunctivae normal.      Pupils: Pupils are equal, round, and reactive to light.   Cardiovascular:      Rate and Rhythm: tachycardic  Pulmonary:      Effort: Pulmonary effort is normal.      Breath sounds: Normal breath sounds.   Abdominal:      General: Abdomen is flat. Bowel sounds are normal.      Palpations: Abdomen is soft.   Musculoskeletal:      Cervical back: Normal range of motion and neck supple.   Skin:     Comments: BL LE: venous stasis skin changes of bilateral lower extremities with multiple open wounds/ulcers on anterior right ulcers; ACE Wraps     Left Upper arm/forearm/hand: edematous/erythematous  Redness on central chest (stable)  Neurological:      General: No focal deficit present.      Mental Status: She is alert and oriented to person, place, and time.      Last Recorded Vitals  Blood pressure 127/50, pulse 72, temperature 36.4 °C (97.5 °F), temperature source Temporal, resp. rate 18, height 1.473 m (4' 10\"), weight 90.7 kg (200 lb), SpO2 94 %.  Intake/Output last 3 Shifts:  I/O last 3 completed shifts:  In: 1480 (16.3 mL/kg) [P.O.:480; IV Piggyback:1000]  Out: - (0 mL/kg)   Weight: 90.7 kg     Relevant " Results  Results for orders placed or performed during the hospital encounter of 11/24/23 (from the past 24 hour(s))   POCT GLUCOSE   Result Value Ref Range    POCT Glucose 104 (H) 74 - 99 mg/dL   Procalcitonin   Result Value Ref Range    Procalcitonin 0.11 (H) <=0.07 ng/mL   CBC and Auto Differential   Result Value Ref Range    WBC 10.3 4.4 - 11.3 x10*3/uL    nRBC 0.0 0.0 - 0.0 /100 WBCs    RBC 3.76 (L) 4.00 - 5.20 x10*6/uL    Hemoglobin 10.8 (L) 12.0 - 16.0 g/dL    Hematocrit 35.7 (L) 36.0 - 46.0 %    MCV 95 80 - 100 fL    MCH 28.7 26.0 - 34.0 pg    MCHC 30.3 (L) 32.0 - 36.0 g/dL    RDW 17.4 (H) 11.5 - 14.5 %    Platelets 351 150 - 450 x10*3/uL    Neutrophils % 55.6 40.0 - 80.0 %    Immature Granulocytes %, Automated 2.0 (H) 0.0 - 0.9 %    Lymphocytes % 26.0 13.0 - 44.0 %    Monocytes % 9.2 2.0 - 10.0 %    Eosinophils % 6.4 0.0 - 6.0 %    Basophils % 0.8 0.0 - 2.0 %    Neutrophils Absolute 5.71 (H) 1.60 - 5.50 x10*3/uL    Immature Granulocytes Absolute, Automated 0.21 0.00 - 0.50 x10*3/uL    Lymphocytes Absolute 2.67 0.80 - 3.00 x10*3/uL    Monocytes Absolute 0.95 (H) 0.05 - 0.80 x10*3/uL    Eosinophils Absolute 0.66 (H) 0.00 - 0.40 x10*3/uL    Basophils Absolute 0.08 0.00 - 0.10 x10*3/uL   Transthoracic Echo (TTE) Limited   Result Value Ref Range    BSA 1.93 m2   POCT GLUCOSE   Result Value Ref Range    POCT Glucose 124 (H) 74 - 99 mg/dL   POCT GLUCOSE   Result Value Ref Range    POCT Glucose 121 (H) 74 - 99 mg/dL   POCT GLUCOSE   Result Value Ref Range    POCT Glucose 136 (H) 74 - 99 mg/dL   Comprehensive metabolic panel   Result Value Ref Range    Glucose 84 74 - 99 mg/dL    Sodium 138 136 - 145 mmol/L    Potassium 4.2 3.5 - 5.3 mmol/L    Chloride 106 98 - 107 mmol/L    Bicarbonate 22 21 - 32 mmol/L    Anion Gap 14 10 - 20 mmol/L    Urea Nitrogen 58 (H) 6 - 23 mg/dL    Creatinine 2.68 (H) 0.50 - 1.05 mg/dL    eGFR 18 (L) >60 mL/min/1.73m*2    Calcium 8.0 (L) 8.6 - 10.3 mg/dL    Albumin 2.3 (L) 3.4 - 5.0 g/dL     Alkaline Phosphatase 63 33 - 136 U/L    Total Protein 4.8 (L) 6.4 - 8.2 g/dL    AST 18 9 - 39 U/L    Bilirubin, Total 0.4 0.0 - 1.2 mg/dL    ALT 11 7 - 45 U/L   Magnesium   Result Value Ref Range    Magnesium 2.17 1.60 - 2.40 mg/dL   POCT GLUCOSE   Result Value Ref Range    POCT Glucose 102 (H) 74 - 99 mg/dL          Assessment/Plan   Principal Problem:    Chronic cutaneous venous stasis ulcer (CMS/HCC)    Kira Solo is a 73 y.o. female with a pmhx chronic kidney disease stage IIIb/IV (follows with Dr. Porras) paroxysmal atrial fibrillation on Xarelto, HLD, gout, HTN, coronary artery disease, IDT2DM, history of mitral valve endocarditis in 2017, meningioma resection in 2014, lacunar CVA without deficits, Chronic lymphedema for which patient uses lymphedema pump presenting to the ER after being seen here 3 days ago for similar complaints generalized weakness and chronic venosus stasis/wound/ulcers who presents for weakness. DC to home from SNF about 1 week ago. Admitted for need for placement. Of note, admitted 10/23-10/27 for AIN and rash due to amoxicillin allergy.    Pneumonia  Sinus Tachycardia; resolved underlying infection   Acute leukocytosis, resolved  Erythematous rash; resembles contact dermatitis   Paroxysmal atrial fibrillation (Xarelto)  Pacemaker   CAD  On 11/29/30 BNP: 1,210   Echo Oct 2023: EF: 60%, estrictive pattern of left ventricular diastolic filling, Moderate mitral valve regurgitation  Per chart review: Ms. Solo had syncope and hospitalized for sinoatrial dysfunction at [facility] with PPM 5/18/2022 implantation by Dr. Suggs.   V/Q scan on 11/29/23: Low prob of PE  Chext xray on 11/29/23: revealing small pleural effusion with concerns for pneumonia  Pro-Calc: .11  Plan:  -Continue Tele monitoring   -Echo done; pending read  -Follow up blood cultures   -Interrogate checked for pacemaker today; will run by patient EP doctor to make sure the report is okay  -Started on hydrocortisone  cream 2.5% for rash  -Continue oral Doxycycline 100 BID and azithromycin 500 mg daily (Day 2); monitor for any signs of rxn  -Pending Urine antigens streph pneumonia/legionella    Ambulatory dysfunction, need for placement  Swollen left arm/forearm; secondary to receiving IV, improving   Nonoliguric ALEKSEY on CKD3- likely pre-renal in setting of decreased fluid intake; down-trending   Mild dehydration  Chronic Lymphedema   Chronic RT LE Ulcers/Wounds  -Baseline 1.5-1.9; elevated at admission 2.15; 2.68  in setting of recent AIN.   Left Upper Arm Ultrasound negative for thrombosis   Plan:  -Encourage oral intake   -At this time, wound/ulcer look chronic; non-infected; will hold off on abx; wound care RN following  -Encourage nursing to wrap lower extremity w/ Ace bandages to encourage mobilization of fluid as well as sit up in chair  -Outpatient follow up with established podiatry Dr. Cain   -Cont home meds of sodium bicarb  -Continue wound care dressing: Aquacel Ag, ABD, Kerlix every other day and PRN    Chronic Diarrhea  -Cdiff PCR positive, EIA toxins negative  -No need for isolation precautions  -No need to treat w/oral vanc- if diarrhea > 5-6 liquid stools/day, leukocytosis, abd pain or worsening clinically signs will consider treatment    IDT2DM (Last A1C 7.2% 10/23)  -Acchuchecks before meals and bedtime  -Holding Semglee  -Mild SSI    Chronic conditions:  History of mitral valve endocarditis in 2017  Lacunar CVA without deficits  HLD  Gout  HTN  -Cont home meds     IVF: as needed  Tele: on tele  Diet: diabetic  Consults: wound care  DVT ppx: Xarelto, SCDs  Code status: DNR DNI    Dispo: Patient currently being treated for pnemonia. Will monitor patient for one more day, likely discharge to SNF tomorrow if patient is stable    Kaiser Toussaint DO  Internal Medicine, PGY3  Case and plan discussed with attending       Norbert Saab DO

## 2023-12-01 NOTE — CARE PLAN
The patient's goals for the shift include      The clinical goals for the shift include patient will remain stable      Problem: Pain  Goal: My pain/discomfort is manageable  Outcome: Progressing     Problem: Safety  Goal: Patient will be injury free during hospitalization  Outcome: Progressing  Goal: I will remain free of falls  Outcome: Progressing     Problem: Daily Care  Goal: Daily care needs are met  Outcome: Progressing     Problem: Skin  Goal: Decreased wound size/increased tissue granulation at next dressing change  Outcome: Progressing  Goal: Participates in plan/prevention/treatment measures  Outcome: Progressing  Goal: Prevent/manage excess moisture  Outcome: Progressing  Goal: Prevent/minimize sheer/friction injuries  Outcome: Progressing  Goal: Promote/optimize nutrition  Outcome: Progressing  Goal: Promote skin healing  Outcome: Progressing     Problem: Recent hospitalization or complication while living with DM  Goal: Patient will effectively self-manage their DM disease process  Outcome: Progressing     Problem: Address barriers to lifestyle change  Goal: Find workable solutions to meet health goals  Outcome: Progressing     Problem: Diabetes  Goal: Achieve decreasing blood glucose levels by end of shift  Outcome: Progressing  Goal: Increase stability of blood glucose readings by end of shift  Outcome: Progressing     Problem: Fall/Injury  Goal: Verbalize understanding of personal risk factors for fall in the hospital  Outcome: Progressing  Goal: Verbalize understanding of risk factor reduction measures to prevent injury from fall in the home  Outcome: Progressing  Goal: Use assistive devices by end of the shift  Outcome: Progressing  Goal: Pace activities to prevent fatigue by end of the shift  Outcome: Progressing

## 2023-12-01 NOTE — NURSING NOTE
Patient had consistent pain in the epigastric area of her abdomen and was administered dilaudid 0.2 mg every 4 hours along with a one time dose of OXY IR 5 mg to ease her pain. Patient also was administered IV compazine 10 mg once due to nausea. Patient neuro checks were unchanged. Patient vital signs stable. Patient safety maintained.

## 2023-12-01 NOTE — NURSING NOTE
0950 patient bp 111/54 this am. Dr. Saab notified, per DR. Saab hold losartan this am   1100 patient ace wrap applied to left arm, left arm elevated and ice applied   1900 patient stable this shift.   Patient resting in bed, Left arm elevated on pillow ace warp in tact. Patient intermitted icing l arm. Patient had 1 bm this shift.  Denies any pain or discomfort.

## 2023-12-01 NOTE — PROGRESS NOTES
"Kira Solo is a 73 y.o. female on day 7 of admission presenting with Chronic cutaneous venous stasis ulcer (CMS/HCC).    Subjective   Overnight, patient heart rate stayed <90. No overnight fevers.    This morning, patient was seen and examined at bedside.  Patient states during the mid of the night felt \"mild itchy\"   Patient denies any symptoms such as swelling in her legs, chest pain, sob, lightheadedness, or headaches.  No bowel movement this morning, however patient report she feels like she is about to have one     Objective   Constitutional:       Appearance: She is obese   HENT:      Head: Normocephalic and atraumatic.      Nose: Nose normal.      Mouth/Throat:      Mouth: Mucous membranes are moist.   Eyes:      Extraocular Movements: Extraocular movements intact.      Conjunctiva/sclera: Conjunctivae normal.      Pupils: Pupils are equal, round, and reactive to light.   Cardiovascular:      Rate and Rhythm: tachycardic  Pulmonary:      Effort: Pulmonary effort is normal.      Breath sounds: Normal breath sounds.   Abdominal:      General: Abdomen is flat. Bowel sounds are normal.      Palpations: Abdomen is soft.   Musculoskeletal:      Cervical back: Normal range of motion and neck supple.   Skin:     Comments: BL LE: venous stasis skin changes of bilateral lower extremities with multiple open wounds/ulcers on anterior right ulcers; ACE Wraps     Left Upper arm/forearm/hand: edematous/erythematous  Redness on central chest (stable)  Neurological:      General: No focal deficit present.      Mental Status: She is alert and oriented to person, place, and time.      Last Recorded Vitals  Blood pressure 111/54, pulse 70, temperature 36.3 °C (97.3 °F), temperature source Temporal, resp. rate 18, height 1.473 m (4' 10\"), weight 90.7 kg (200 lb), SpO2 96 %.  Intake/Output last 3 Shifts:  I/O last 3 completed shifts:  In: 560 (6.2 mL/kg) [P.O.:560]  Out: 300 (3.3 mL/kg) [Urine:300 (0.1 mL/kg/hr)]  Weight: " 90.7 kg     Relevant Results  Results for orders placed or performed during the hospital encounter of 11/24/23 (from the past 24 hour(s))   POCT GLUCOSE   Result Value Ref Range    POCT Glucose 107 (H) 74 - 99 mg/dL   POCT GLUCOSE   Result Value Ref Range    POCT Glucose 152 (H) 74 - 99 mg/dL   Electrocardiogram, 12-lead PRN ACS symptoms   Result Value Ref Range    Ventricular Rate 131 BPM    Atrial Rate 133 BPM    QRS Duration 128 ms    QT Interval 332 ms    QTC Calculation(Bazett) 490 ms    R Axis -53 degrees    T Axis 46 degrees    QRS Count 21 beats    Q Onset 200 ms    T Offset 366 ms    QTC Fredericia 431 ms   ECG 12 lead   Result Value Ref Range    Ventricular Rate 120 BPM    Atrial Rate 240 BPM    QRS Duration 140 ms    QT Interval 346 ms    QTC Calculation(Bazett) 489 ms    R Axis -45 degrees    T Axis -27 degrees    QRS Count 20 beats    Q Onset 179 ms    T Offset 352 ms    QTC Fredericia 435 ms   POCT GLUCOSE   Result Value Ref Range    POCT Glucose 75 74 - 99 mg/dL   POCT GLUCOSE   Result Value Ref Range    POCT Glucose 101 (H) 74 - 99 mg/dL          Assessment/Plan   Principal Problem:    Chronic cutaneous venous stasis ulcer (CMS/HCC)    Kira Solo is a 73 y.o. female with a pmhx chronic kidney disease stage IIIb/IV (follows with Dr. Porras) paroxysmal atrial fibrillation on Xarelto, HLD, gout, HTN, coronary artery disease, IDT2DM, history of mitral valve endocarditis in 2017, meningioma resection in 2014, lacunar CVA without deficits, Chronic lymphedema for which patient uses lymphedema pump presenting to the ER after being seen here 3 days ago for similar complaints generalized weakness and chronic venosus stasis/wound/ulcers who presents for weakness. DC to home from SNF about 1 week ago. Admitted for need for placement. Of note, admitted 10/23-10/27 for AIN and rash due to amoxicillin allergy.    Pneumonia  Sinus Tachycardia; resolved   Acute leukocytosis, resolved  Erythematous rash;  resembles contact dermatitis   Paroxysmal atrial fibrillation (Xarelto)  Pacemaker   CAD  On 11/29/30 BNP: 1,210   Echo Oct 2023: EF: 60%, estrictive pattern of left ventricular diastolic filling, Moderate mitral valve regurgitation  Per chart review: Ms. Solo had syncope and hospitalized for sinoatrial dysfunction at [facility] with PPM 5/18/2022 implantation by Dr. Suggs.   V/Q scan on 11/29/23: Low prob of PE  Chext xray on 11/29/23: revealing small pleural effusion with concerns for pneumonia  Pro-Calc: .11  Interrogate checked; reviewed by EP doctor, revealed episodes of afib  Blood cultures 2nd set (11/29/23): NTD  Plan:  -Continue Tele monitoring   -Echo returned back revealed rapid atrial arrhythmia which may influence the estimate of left ventricular function and transvalvular flows. EF: 55-60%. Severe mitral annular calcification.  -Started on hydrocortisone cream 2.5% for rash  -Continue oral Doxycycline 100 BID (11/29 - continue), will complete a 5 day course  -Discontinued azithromycin on 12/1/23  -Monitor for any signs of rxn  -Urine antigens streph pneumonia/legionella pending collection    Ambulatory dysfunction, need for placement  Swollen left arm/forearm; secondary to receiving IV, improving   Nonoliguric ALEKSEY on CKD3- likely pre-renal in setting of decreased fluid intake; down-trending   Mild dehydration  Chronic Lymphedema   Chronic RT LE Ulcers/Wounds  -Baseline 1.5-1.9; elevated at admission 2.15; 2.68  in setting of recent AIN.   Left Upper Arm Ultrasound negative for thrombosis   Plan:  -Encourage oral intake   -At this time, wound/ulcer look chronic; non-infected; will hold off on abx; wound care RN following  -Encourage nursing to wrap lower extremity w/ Ace bandages to encourage mobilization of fluid as well as sit up in chair  -Outpatient follow up with established podiatry Dr. Cain   -Cont home meds of sodium bicarb  -Continue wound care dressing: Aquacel Ag, ABD, Kerlix every  other day and PRN    Chronic Diarrhea  -Cdiff PCR positive, EIA toxins negative  -No need for isolation precautions  -No need to treat w/oral vanc- if diarrhea > 5-6 liquid stools/day, leukocytosis, abd pain or worsening clinically signs will consider treatment  -Scheulded     IDT2DM (Last A1C 7.2% 10/23)  -Acchuchecks before meals and bedtime  -Holding Semglee  -Mild SSI    Chronic conditions:  History of mitral valve endocarditis in 2017  Lacunar CVA without deficits  HLD  Gout  HTN  -Cont home meds     IVF: as needed  Tele: on tele  Diet: diabetic  Consults: wound care  DVT ppx: Xarelto, SCDs  Code status: DNR DNI    Dispo: Patient currently being treated for pnemonia. Will monitor patient for one more day, likely discharge to SNF tomorrow if patient is stable    Kaiser Toussaint DO  Internal Medicine, PGY3  Case and plan discussed with attending       Norbert Saab DO

## 2023-12-01 NOTE — PROGRESS NOTES
Occupational Therapy    OT Treatment    Patient Name: Kira Solo  MRN: [id number]  Today's Date: 12/1/2023  Time Calculation  Start Time: 1015  Stop Time: 1100  Time Calculation (min): 45 min         Assessment:  OT Assessment: Pt. benefits from moderate intensity therapy to increase safety and independence in ADLs and mobility to return to PLOF  Prognosis: Fair  Evaluation/Treatment Tolerance: Patient limited by fatigue  End of Session Communication: Bedside nurse, PCT/NA/CTA  End of Session Patient Position: Up in chair, Alarm on  OT Assessment Results: Decreased ADL status, Decreased functional mobility  Prognosis: Fair  Evaluation/Treatment Tolerance: Patient limited by fatigue    Plan:  OT Frequency: 3 times per week  OT Discharge Recommendations: Moderate intensity level of continued care     Subjective     Outcome Measures:[facility] Daily Activity  Putting on and taking off regular lower body clothing: A lot  Bathing (including washing, rinsing, drying): A lot  Putting on and taking off regular upper body clothing: A little  Toileting, which includes using toilet, bedpan or urinal: A lot  Taking care of personal grooming such as brushing teeth: A little  Eating Meals: None  Daily Activity - Total Score: 16     12/01/23 1015   OT Last Visit   OT Received On 12/01/23   General   Reason for Referral impaired ADL's, weakness   Prior to Session Communication Bedside nurse   Patient Position Received Bed, 3 rail up;Alarm on   Preferred Learning Style verbal;visual   General Comment encouragement to participate   Pain Assessment   Pain Assessment 0-10   Pain Score 0 - No pain   Cognition   Overall Cognitive Status WFL   Grooming   Grooming Level of Assistance Setup   Grooming Where Assessed Edge of bed   Grooming Comments brushed teeth and washed face   UE Bathing   UE Bathing Level of Assistance Setup   UE Bathing Where Assessed Edge of bed   UE Bathing Comments washed under arms   LE Bathing   LE Bathing Level of  Assistance Maximum assistance   LE Bathing Where Assessed   (standing alongside EOB)   LE Bathing Comments washed pt buttocks and marlon area in stance   Toileting   Toileting Level of Assistance Maximum assistance   Where Assessed Bed level   Toileting Comments pt  using pure wick   Functional Standing Tolerance   Functional Standing Tolerance Comments STS, min A of 2 people standing alongside EOB for approsimatley 2 minutes   Bed Mobility   Bed Mobility   (max A to EOB)   Transfers   Transfer   (bed to chair, min A of 2 people)   Static Sitting Balance   Static Sitting-Level of Assistance Close supervision   Static Sitting-Comment/Number of Minutes 30 min   IP OT Assessment   OT Assessment Pt. benefits from moderate intensity therapy to increase safety and independence in ADLs and mobility to return to PLOF   Prognosis Fair   Evaluation/Treatment Tolerance Patient limited by fatigue   End of Session Communication Bedside nurse;PCT/NA/CTA   End of Session Patient Position Up in chair;Alarm on   OT Assessment   OT Assessment Results Decreased ADL status;Decreased functional mobility   Education   Individual(s) Educated Patient   Education Provided Fall precautons   Patient Response to Education Patient/Caregiver Verbalized Understanding of Information   Education Comment pt would bnefit from continued reinforcement   Inpatient Plan   OT Frequency 3 times per week   OT Discharge Recommendations Moderate intensity level of continued care               Goals:  Encounter Problems       Encounter Problems (Resolved)       Dressings Lower Extremities       STG - Patient to complete lower body dressing min assist (Met)       Start:  11/27/23    Expected End:  12/11/23    Resolved:  11/28/23            Grooming       STG - Patient completes grooming SUP (Met)       Start:  11/27/23    Expected End:  12/11/23    Resolved:  11/28/23         STG - Patient will tolerate standing 2-4 min to participate in ADLs (Met)       Start:   11/27/23    Expected End:  12/11/23    Resolved:  11/28/23            Safety       STG - Patient uses call light consistently to request assistance with transfers (Adequate for Discharge)       Start:  11/26/23    Resolved:  11/28/23            Transfers       STG - Patient will perform bed mobility min assist (Adequate for Discharge)       Start:  11/27/23    Expected End:  12/11/23    Resolved:  11/28/23         STG - Patient will perform toilet transfer MOD assist (Adequate for Discharge)       Start:  11/27/23    Expected End:  12/11/23    Resolved:  11/28/23

## 2023-12-01 NOTE — PROGRESS NOTES
Per resident, patient will be ready to d/c today at 5pm. Message and updates sent to [facility]. Per liaison at SNF, they can accept patient tomorrow at 12pm. Wheelchair transport is confirmed for tomorrow at noon. Facility and nursing updated, to update patient. 7000 was completed prior to when patient was supposed to admit to [facility] earlier this week.     1526- Updated patient at bedside that transport to SNF  is arranged for 12pm tomorrow. Patient to notify her family.     Lin Tomas RN

## 2023-12-02 NOTE — CARE PLAN
Problem: Pain  Goal: My pain/discomfort is manageable  Outcome: Met     Problem: Skin  Goal: Participates in plan/prevention/treatment measures  Outcome: Met     Problem: Diabetes  Goal: Achieve decreasing blood glucose levels by end of shift  Outcome: Met     Problem: Fall/Injury  Goal: Use assistive devices by end of the shift  Outcome: Met     The clinical goals for the shift include patient will remain stable this shift    Over the shift, the patient did make progress toward the following goals.

## 2023-12-02 NOTE — CARE PLAN
The patient's goals for the shift include      The clinical goals for the shift include patient will remain stable this shift      Problem: Safety  Goal: Patient will be injury free during hospitalization  Outcome: Progressing  Goal: I will remain free of falls  Outcome: Progressing     Problem: Daily Care  Goal: Daily care needs are met  Outcome: Progressing     Problem: Skin  Goal: Decreased wound size/increased tissue granulation at next dressing change  Outcome: Progressing  Goal: Prevent/manage excess moisture  Outcome: Progressing  Goal: Prevent/minimize sheer/friction injuries  Outcome: Progressing  Goal: Promote/optimize nutrition  Outcome: Progressing  Goal: Promote skin healing  Outcome: Progressing     Problem: Recent hospitalization or complication while living with DM  Goal: Patient will effectively self-manage their DM disease process  Outcome: Progressing     Problem: Address barriers to lifestyle change  Goal: Find workable solutions to meet health goals  Outcome: Progressing     Problem: Diabetes  Goal: Increase stability of blood glucose readings by end of shift  Outcome: Progressing     Problem: Fall/Injury  Goal: Verbalize understanding of personal risk factors for fall in the hospital  Outcome: Progressing  Goal: Verbalize understanding of risk factor reduction measures to prevent injury from fall in the home  Outcome: Progressing  Goal: Pace activities to prevent fatigue by end of the shift  Outcome: Progressing

## 2023-12-02 NOTE — NURSING NOTE
Patient had an uneventful night. Patient had no complaints of pain or other to me. Patient vital signs stable. Patient safety maintained.

## 2023-12-12 PROBLEM — U07.1 COVID-19: Status: ACTIVE | Noted: 2023-01-01

## 2023-12-12 PROBLEM — I25.10 ATHEROSCLEROTIC HEART DISEASE OF NATIVE CORONARY ARTERY WITHOUT ANGINA PECTORIS: Status: ACTIVE | Noted: 2023-01-01

## 2023-12-12 PROBLEM — Z86.79 PERSONAL HISTORY OF OTHER DISEASES OF THE CIRCULATORY SYSTEM: Status: ACTIVE | Noted: 2023-01-01

## 2023-12-12 PROBLEM — L03.115 CELLULITIS OF RIGHT LOWER LIMB: Status: ACTIVE | Noted: 2023-01-01

## 2023-12-12 PROBLEM — R19.7 DIARRHEA, UNSPECIFIED: Status: ACTIVE | Noted: 2023-01-01

## 2023-12-12 PROBLEM — R48.8 OTHER SYMBOLIC DYSFUNCTIONS: Status: ACTIVE | Noted: 2023-01-01

## 2023-12-12 PROBLEM — I76: Status: ACTIVE | Noted: 2023-01-01

## 2023-12-12 PROBLEM — H40.53X0: Status: ACTIVE | Noted: 2023-01-01

## 2023-12-12 PROBLEM — I60.7: Status: ACTIVE | Noted: 2023-01-01

## 2023-12-12 PROBLEM — I44.60: Status: ACTIVE | Noted: 2023-01-01

## 2023-12-12 PROBLEM — D64.9 ANEMIA, UNSPECIFIED: Status: ACTIVE | Noted: 2023-01-01

## 2023-12-12 PROBLEM — Z86.011 PERSONAL HISTORY OF BENIGN NEOPLASM OF THE BRAIN: Status: ACTIVE | Noted: 2023-01-01

## 2023-12-13 NOTE — PROGRESS NOTES
Subjective   Kira Solo is a 73 y.o. 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 Last seen June 2023.     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 Discharged to SNF for severe physical deconditioning.    Prior to admission, she notes stable cardiac symptoms with no chest pain, dyspnea, PND, orthopnea, palpitations or syncope. She notes easy bruising on arm while taking Xarelto. Has had less elevated HR > 100 bpm readings on home vital signs recording than 6 months ago. These have improved with metoprolol ER 25 mg a day. I have instructed her she can take PRN extra 25 mg for HR > 100 bpm.     Ms. Solo had syncope and hospitalized for sinoatrial dysfunction at [facility] with PPM 5/18/2022 implantation by Dr. Suggs.      She was hospitalized with a lacunar CVA at [facility] earlier in 2018. An ECG there on admission showed junctional escape rhythm. An ECG in my office in August showed newly diagnosed atrial fibrillation with slow ventricular response (46 bpm). Ms Solo is adherent to Xarelto 20 mg a day for stroke protection due to atrial fibrillation.     May 2018 [facility] labs: , TG 65, HDL 62, . Patient has history of prior statin intolerance (she thinks to simvastatin and atorvastatin) several years ago. Took weekly rosuvastatin for 3 weeks in mid 2018 and did not tolerance with same muscle pain. Rosuvastatin was stopped on admission for acute CVA. Tried again in August but each dose caused bilateral thigh pain and weakness so stopped. Taking Zetia 10 mg a day and red yeast rice, LDL on that treatment in April 2019 was 80, repeat June 2021 .     Past Medical History:  1. Diabetes Mellitus, type 2  2. Hypertension  3.  "CKD stage 3/4  4. Psoriasis (was on Humira but discontinued with MV endocarditis diagnosis 3/2017)  5. March 2017 mitral valve bacterial endocarditis: Group B strep MV endocarditis and completed IV antibiotics in May 2017. Repeat JEAN-PAUL at that time showed no valvular destruction and decrease in vegetation size compared to April JEAN-PAUL and follow up TTE in May 2017 demonstrated no notable gross vegetation or MV destruction. Her IE treatment course was complicated by septic emboli to bilateral cerebral hemispheres without mycotic aneurysm and retinal artery occlusion. She still notes visual changes \"like seeing in a fog with low light\" but has had some improvement in the last 6 months.   6. Hyperlipidemia  7. Gout  8. CVA (lacunar stroke) treated at [facility] in July 2018  9. Sinoatrial dysfunction s/p 5/18/2022 [facility] (Dr. Suggs)     Social History:  Non-smoker     Family History:  No premature CAD in 1st degree relatives ( 55 years of age for male relatives, 65 years of age for female relatives)     Review of Systems    A 14 point review of systems was asked. All questions were negative except for pertinent positives listed in the HPI.      Objective   Physical Exam  BP Readings from Last 3 Encounters:   12/13/23 105/50   12/02/23 122/50   11/21/23 148/72      Wt Readings from Last 3 Encounters:   12/13/23 96.6 kg (213 lb)   11/24/23 90.7 kg (200 lb)   11/21/23 90 kg (198 lb 6.6 oz)      BMI: Estimated body mass index is 44.52 kg/m² as calculated from the following:    Height as of 11/24/23: 1.473 m (4' 10\").    Weight as of this encounter: 96.6 kg (213 lb).  BSA: Estimated body surface area is 1.99 meters squared as calculated from the following:    Height as of 11/24/23: 1.473 m (4' 10\").    Weight as of this encounter: 96.6 kg (213 lb).    General: no acute distress  HEENT: EOMI, no scleral icterus.  Lungs: Clear to auscultation bilaterally without wheezing, rales, or rhonchi.  Cardiovascular: Regular rhythm and rate. " Normal S1 and S2. No murmurs, rubs, or gallops are appreciated. JVP normal.  Abdomen: Soft, nontender, nondistended. Bowel sounds present.  Extremities: Warm and well perfused with equal 2+ pulses bilaterally.  No edema present.  Neurologic: Alert and oriented x3.    I have personally reviewed the following images and laboratory findings:  Last echocardiogram: 2023 echo: LV EF 55-60%, no LVH (LVMI 83 gm/m2), abnormal diastology in AFib (E/e' 43 but not valid with severe MAC), normal LA size (TIFFANI 17 ml/m2), normal RV/RA, AV not assessed, severe MAC, trace MR, trac TR, unable to estimate RVSP.    Prior echo, 5/10/2021: LV EF 60-65%, LV concentric remodeling (LVMI 94 gm/m2), pseudonormal diastology (E/e' 20), severe LAE (TIFFANI 53 ml/m2), mod MAC, mild MV thickening, mean MV gradient 3 mm Hg, trivial AI, trace TR, RVSP 22 mm Hg.    Last cath / stress test: 2022 SPECT nuclear stress: Normal myocardial perfusion without ischemia or scar pattern, LV EF > 60%, normal LV size.    Most recent EC2023 ECG: Atrial pacing, 60 bpm, incomplete RBBB, LVH criteria. Personally reviewed in office.    2023 ECG: Electrical atrial pacing, 74 bpm, bifascicular block (RBBB/LAFB), LVH with widened QRS (140 msec), pattern present since 2017. Personally reviewed in office.     2022 ECG: Electrical atrial pacing with AFib w/  bpm, bifascicular block (RBBB/LAFB), LVH with widened QRS (148 msec), pattern present since 2017. Personally reviewed in office.     2022 ECG: Electrical atrial pacing, 70 bpm. Personally reviewed in office.     2021 ECG: Atrial fibrillation, 42 bpm, bifascicular block (RBBB/LAFB), LVH with widened QRS (140 msec), pattern present since 2017. Personally reviewed in office.     2021 ECG: Sinus rhythm, 57 bpm, bifascicular block (RBBB/LAFB), LVH with widened QRS (148 msec), pattern present since 2017. Personally reviewed in office.     2018 ECG: Atrial  "fibrillation with slow ventricular response, 46 bpm, bifascicular block (RBBB/LAFB), LVH with widened QRS (136 msec). Personally reviewed in office.     6/13/2018 ECG: Sinus salome, 52 bpm, bifascicular block (RBBB/LAFB), LVH with widened QRS (142 msec), present pattern since 6/2017. Reviewed in office.     Lab Results   Component Value Date    CHOL 210 (H) 04/03/2023    CHOL 191 06/09/2021    CHOL 175 07/27/2020     Lab Results   Component Value Date    HDL 65.2 04/03/2023    HDL 73.4 06/09/2021    HDL 70.6 07/27/2020     No results found for: \"LDLCALC\"  Lab Results   Component Value Date    TRIG 90 04/03/2023    TRIG 60 06/09/2021    TRIG 81 07/27/2020     No components found for: \"CHOLHDL\"      Assessment/Plan   1. Atrial fibrillation:  Atrial fibrillation likely contributed to her July 2018 CVA as a cardioembolic etiology. Adjust Xarelto to 15 mg a day from 20 mg a day for Cr clearance < 30 ml/min for CVA prophylaxis. Her [id number] score is 5 (7% CVA risk yearly / 10% arterial embolism risk yearly). Now s/p 5/18/2022 pacemaker. HR > 100 bpm in office in July 2022 but controlled today at lower rate limited. Will continue metoprolol ER 25 mg a day to lower resting HR < 100 bpm. Can take extra 25 mg tablet at home for elevated HR episodes.     2. Sinoatrial dysfunction:  S/p 5/18/2022 dual chamber PPM by Dr. Suggs. Appropriate PPM function on recent office check.     3. Mitral valve Group B streptococcus endocarditis:  Patient completed IV antibiotic therapy in May 2017. There is no destruction or residual vegetation on MV on 5/4/2017 echo. She is without cardiac symptoms with only very soft cardiac murmur. Still with retinal artery occlusion vision changes. She did not require mitral valve surgery. No further cardiac testing at this time. Antibiotic prophylaxis for dental cleaning recommended (Amoxicillin 500 mg tablets x 4, 1 hour before). May 2021 echocardiogram showed stable mitral valve appearance compared " to 2018 study.     4. Dyslipidemia:  LDL in 2019 was 80 after starting ezetimibe and taking red yeast rice supplement down from LDL of 152 prior to treatment, repeat lipids in June 2021 LDL was 106 and April 2023 . History of statin intolerance (failed rosuvastatin 5 mg 1x/week twice this year with Vitamin D level at 36). She is taking ezetimibe, fish oil supplements, and red yeast rice. Continue current treatment plan. Discussed improving dietary intake to reduce cholesterol and processed carbohydrates.      5. Hypertension:  Blood pressure at goal at this visit.     Follow up with Dr. Cruz in 6 months.            SIGNATURE: Trip Cruz MD PATIENT NAME: Kira Solo   DATE/TIME: December 13, 2023 11:30 AM MRN: [id number]

## 2023-12-13 NOTE — PATIENT INSTRUCTIONS
You were seen in the [facility] for your history of mitral valve group B streptococcus endocarditis (infected mitral valve), high cholesterol, and paroxysmal atrial fibrillation.      Your exam today is normal. Your blood pressure is controlled. Your 12/5/2023 ECG today showed that your pacemaker is working well. No atrial fibrillation rhythm today.      Your November 2023 echocardiogram was unchanged from prior study done in May 2022. Your May 2022 nuclear stress test result was normal without a pattern of blocked arteries.     Atrial fibrillation is an irregular heart rhythm of the top chambers of the heart. This heart rhythm increases your stroke risk because blood can slow down and clot inside the left atrium of your heart (left top chamber). Continue to take the blood thinner, Xarelto at 15 mg a day because of your reduced kidney function instead of your prior 20 mg once a day dose to reduce this stroke risk. Continue metoprolol ER 25 mg a day.     You did not tolerate rosuvastatin 5 mg once a week for your cholesterol. We are prescribing a statin medication alternative, Zetia 10 mg a day, that blocks cholesterol from being absorbed in the gut. Continue to take the red yeast rice supplement as this has a weak statin-like effect at reducing cholesterol being made in the liver. Your June 2021 cholesterol blood work was at goal. Your April 2023 cholesterol blood work showed that your LDL (bad) cholesterol increased to 127 from 106.      Your blood pressure is at goal today on review of your home BP readings. Goal blood pressure for you is 120-130 mm Hg or below. Continue amlodipine 10 mg a day, lisinopril 40 mg a day, and furosemide 40 mg tablets twice a day as needed for ankle swelling.    I recommend we keep your heart medications the same as your prior outpatient medications based on review today while you are in the [facility].     Follow up with Dr. Cruz in 6 months.

## 2023-12-15 NOTE — PROGRESS NOTES
Chief Complaint: Follow up CKD    In ECF after hospitalization  Covid in ECF, cristobal johnsons while in hospital  Now in [facility]  Using lymphedema pump  Denies nausea, vomiting, chest pain, dyspnea  No urinary symptoms  30 lbs over October weight    NAD, frail, seated in wheelchair  Sclera AI s inj  MMM, no sores  Deferred secondary to COVID  Significant 3+ edema, chronic change, LE bandaged without seepage  No tremor  No rash, B LE wounds R > L    CKD stage 4, recent ALEKSEY  HTN  Proteinuria  Malnutrition    Diet, low salt needed  Diuretic  Weights, bp  Hold amlodipine, losartan to give bp room for diuresis  Labs  Follow up in one month    Addendum  Discussed with ASHU Lewis at facility

## 2023-12-21 NOTE — TELEPHONE ENCOUNTER
Called and discussed with Alexy. Kira has lymphedema and asks what could be done. Discussed lymphedema clinic would be a good option. Discussed not sure if they do it at the [facility]--he will ask. Discussed if not, they could ask her to be transferred to \[facility]"" with lymphedema clinic  He states NP evaluated it and said related to this.

## 2023-12-21 NOTE — TELEPHONE ENCOUNTER
Pts  called and states that pt is at the [facility] under care for lymphedema.    Her left leg is swollen, very painful and she can not move it.    Pts  suggested the ER but she does not want to go.    He is calling to find out what advise you might have for them.    Even a VV so she can describe to you what is bothering her and how they are trying to treat it.    Please advise.    Call on cell [phone]

## 2023-12-26 NOTE — TELEPHONE ENCOUNTER
ANNA      Patient's  left voicemail stating that patient's left leg was swollen and could not bend it.  Patient refused to go to the ER for care.  Patient's  was looking for advise on what to do.  Looks like patient was seen via telehealth to address concerns.

## 2024-01-01 ENCOUNTER — APPOINTMENT (OUTPATIENT)
Dept: PODIATRY | Facility: CLINIC | Age: 74
End: 2024-01-01
Payer: MEDICARE

## 2024-01-01 ENCOUNTER — TELEPHONE (OUTPATIENT)
Dept: PRIMARY CARE | Facility: CLINIC | Age: 74
End: 2024-01-01
Payer: MEDICARE

## 2024-01-01 ENCOUNTER — OFFICE VISIT (OUTPATIENT)
Dept: DERMATOLOGY | Facility: CLINIC | Age: 74
End: 2024-01-01
Payer: MEDICARE

## 2024-01-01 DIAGNOSIS — L60.8 OTHER NAIL DISORDERS: ICD-10-CM

## 2024-01-01 DIAGNOSIS — L85.3 XEROSIS CUTIS: ICD-10-CM

## 2024-01-01 DIAGNOSIS — N18.32 STAGE 3B CHRONIC KIDNEY DISEASE (MULTI): Primary | Chronic | ICD-10-CM

## 2024-01-01 DIAGNOSIS — I87.2 VENOUS INSUFFICIENCY: ICD-10-CM

## 2024-01-01 DIAGNOSIS — L30.4 ERYTHEMA INTERTRIGO: Primary | ICD-10-CM

## 2024-01-01 PROCEDURE — 3066F NEPHROPATHY DOC TX: CPT | Performed by: STUDENT IN AN ORGANIZED HEALTH CARE EDUCATION/TRAINING PROGRAM

## 2024-01-01 PROCEDURE — 1036F TOBACCO NON-USER: CPT | Performed by: STUDENT IN AN ORGANIZED HEALTH CARE EDUCATION/TRAINING PROGRAM

## 2024-01-01 PROCEDURE — 99204 OFFICE O/P NEW MOD 45 MIN: CPT | Performed by: STUDENT IN AN ORGANIZED HEALTH CARE EDUCATION/TRAINING PROGRAM

## 2024-01-01 PROCEDURE — 4010F ACE/ARB THERAPY RXD/TAKEN: CPT | Performed by: STUDENT IN AN ORGANIZED HEALTH CARE EDUCATION/TRAINING PROGRAM

## 2024-01-01 PROCEDURE — 1159F MED LIST DOCD IN RCRD: CPT | Performed by: STUDENT IN AN ORGANIZED HEALTH CARE EDUCATION/TRAINING PROGRAM

## 2024-01-01 PROCEDURE — 1126F AMNT PAIN NOTED NONE PRSNT: CPT | Performed by: STUDENT IN AN ORGANIZED HEALTH CARE EDUCATION/TRAINING PROGRAM

## 2024-01-01 RX ORDER — AMLODIPINE BESYLATE 5 MG/1
5 TABLET ORAL DAILY
Qty: 90 TABLET | Refills: 3 | Status: SHIPPED | OUTPATIENT
Start: 2024-01-01 | End: 2024-02-03

## 2024-01-05 NOTE — LETTER
January 5, 2024     Kira Solo    Patient: Kira Solo   YOB: 1950   Date: 1/5/2024     To whom it may concern:    Due to Kira's medical conditions, it is medically necessary for her to be in assisted living and NOT independent living.    Stephania Warner MD

## 2024-01-05 NOTE — TELEPHONE ENCOUNTER
Pts  is calling to see you can write a letter stating that pt needs to be in assisted living due to her circumstances, she should not be in independent living situation/in her apartment anymore.    He would like this today if at all possible but if is done on Monday he understands.    Please advise.    -call  skyla when ready.
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
## 2024-01-08 NOTE — PATIENT INSTRUCTIONS
For skin folds on your trunk  Start ketoconazole 2% cream in the morning  Then hydrocortisone 2.5% cream at night  Do this for 2 weeks on and 1 week off, then repeat as needed for flares  Start a bland moisturizer to skin every day (Cerave, Cetaphil, Eucerin, Vaseline, or Vanicream brands)  For leg, wrapping with compression stockings to mid thigh given how much edema there is  Start triamcinolone 0.1% cream twice daily to affected area on legs for two weeks on then 1 week off.   Follow up in 6 months

## 2024-01-13 NOTE — ED PROVIDER NOTES
HPI   No chief complaint on file.      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    Benign brain tumor    Candidiasis of skin and nail 06/09/2017    Candidiasis, cutaneous    Central retinal artery occlusion, left eye 05/04/2022    Central retinal artery occlusion, left eye    Central retinal artery occlusion, right eye 05/04/2022    Central retinal artery occlusion, right eye    Diarrhea, unspecified 07/31/2020    Acute diarrhea    Endocarditis, valve unspecified 01/18/2019    Endocarditis    Essential (primary) hypertension 12/07/2022    Benign essential hypertension    Glaucoma secondary to other eye disorders, bilateral, stage unspecified 05/04/2022    Neovascular glaucoma of both eyes    Nonrheumatic mitral (valve) insufficiency 04/24/2017    Mitral regurgitation    Other vascular disorders of iris and ciliary body, left eye 05/04/2022    Rubeosis iridis of left eye    Other vascular disorders of iris and ciliary body, right eye 05/04/2022    Rubeosis iridis of right eye    Personal history of other diseases of the circulatory system     History of hypertension    Personal history of other diseases of urinary system     History of chronic kidney disease    Septic arterial embolism (CMS/[facility]) 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    Cerebral septic emboli    Septic arterial embolism (CMS/[facility]) 03/28/2017    Cerebral septic emboli    Unspecified color vision deficiencies 03/28/2017    Visual color changes    Unspecified retinal vascular occlusion 05/04/2022    Retinal vascular occlusion of both eyes    Unspecified visual disturbance 03/28/2017    Change in vision    Visual hallucinations 04/24/2017    Formed visual hallucinations     Past Surgical History:   Procedure Laterality Date    CT ANGIO NECK  3/28/2017    CT NECK ANGIO W AND WO IV CONTRAST 3/28/2017 [facility]  CLINICAL LEGACY    CT HEAD ANGIO W AND WO IV CONTRAST  3/28/2017    CT HEAD ANGIO W AND WO IV CONTRAST 3/28/2017 [facility] CLINICAL LEGACY    MR HEAD ANGIO WO IV CONTRAST  3/28/2018    MR HEAD ANGIO WO IV CONTRAST 3/28/2018 [facility] ANCILLARY LEGACY    OTHER SURGICAL HISTORY  05/27/2022    Pacemaker insertion    OTHER SURGICAL HISTORY  05/22/2017    Craniotomy Tumor Removal - Complete    OTHER SURGICAL HISTORY  07/01/2019    Colonoscopy complete for polypectomy    OTHER SURGICAL HISTORY  07/01/2019    Endoscopy    TONSILLECTOMY  05/15/2014    Tonsillectomy     Family History   Problem Relation Name Age of Onset    Other (adopted child) Mother      Other (adopted child) Father       Social History     Tobacco Use    Smoking status: Never    Smokeless tobacco: Never   Vaping Use    Vaping Use: Never used   Substance Use Topics    Alcohol use: Not Currently    Drug use: Never       Physical Exam   ED Triage Vitals   Temp Pulse Resp BP   -- -- -- --      SpO2 Temp src Heart Rate Source Patient Position   -- -- -- --      BP Location FiO2 (%)     -- --       Physical Exam    ED Course & MDM        Medical Decision Making      Procedure  Procedures

## 2024-02-02 ENCOUNTER — TELEPHONE (OUTPATIENT)
Dept: PRIMARY CARE | Facility: CLINIC | Age: 74
End: 2024-02-02
Payer: MEDICARE

## 2024-02-02 NOTE — TELEPHONE ENCOUNTER
Patient's  called to state that his wife passed away on Monday. Please advise if any follow up is necessary at home number.

## 2024-02-15 ENCOUNTER — APPOINTMENT (OUTPATIENT)
Dept: PRIMARY CARE | Facility: CLINIC | Age: 74
End: 2024-02-15
Payer: MEDICARE

## 2024-02-19 ENCOUNTER — APPOINTMENT (OUTPATIENT)
Dept: DERMATOLOGY | Facility: CLINIC | Age: 74
End: 2024-02-19
Payer: MEDICARE

## 2024-03-15 ENCOUNTER — APPOINTMENT (OUTPATIENT)
Dept: OPHTHALMOLOGY | Facility: CLINIC | Age: 74
End: 2024-03-15
Payer: MEDICARE

## 2024-07-30 ENCOUNTER — APPOINTMENT (OUTPATIENT)
Dept: CARDIOLOGY | Facility: CLINIC | Age: 74
End: 2024-07-30
Payer: MEDICARE